# Patient Record
Sex: FEMALE | Race: WHITE | NOT HISPANIC OR LATINO | Employment: OTHER | URBAN - METROPOLITAN AREA
[De-identification: names, ages, dates, MRNs, and addresses within clinical notes are randomized per-mention and may not be internally consistent; named-entity substitution may affect disease eponyms.]

---

## 2017-01-01 ENCOUNTER — ANESTHESIA EVENT (OUTPATIENT)
Dept: GASTROENTEROLOGY | Facility: HOSPITAL | Age: 82
End: 2017-01-01

## 2017-01-01 ENCOUNTER — APPOINTMENT (OUTPATIENT)
Dept: GENERAL RADIOLOGY | Facility: HOSPITAL | Age: 82
End: 2017-01-01

## 2017-01-01 ENCOUNTER — APPOINTMENT (OUTPATIENT)
Dept: GENERAL RADIOLOGY | Facility: HOSPITAL | Age: 82
End: 2017-01-01
Attending: INTERNAL MEDICINE

## 2017-01-01 ENCOUNTER — APPOINTMENT (OUTPATIENT)
Dept: GENERAL RADIOLOGY | Facility: HOSPITAL | Age: 82
End: 2017-01-01
Attending: HOSPITALIST

## 2017-01-01 ENCOUNTER — APPOINTMENT (OUTPATIENT)
Dept: ULTRASOUND IMAGING | Facility: HOSPITAL | Age: 82
End: 2017-01-01

## 2017-01-01 ENCOUNTER — HOSPITAL ENCOUNTER (INPATIENT)
Facility: HOSPITAL | Age: 82
LOS: 7 days | End: 2017-03-24
Attending: HOSPITALIST | Admitting: INTERNAL MEDICINE

## 2017-01-01 ENCOUNTER — HOSPITAL ENCOUNTER (INPATIENT)
Facility: HOSPITAL | Age: 82
LOS: 14 days | End: 2017-03-17
Attending: EMERGENCY MEDICINE | Admitting: INTERNAL MEDICINE

## 2017-01-01 ENCOUNTER — ANESTHESIA (OUTPATIENT)
Dept: GASTROENTEROLOGY | Facility: HOSPITAL | Age: 82
End: 2017-01-01

## 2017-01-01 ENCOUNTER — APPOINTMENT (OUTPATIENT)
Dept: CT IMAGING | Facility: HOSPITAL | Age: 82
End: 2017-01-01

## 2017-01-01 ENCOUNTER — APPOINTMENT (OUTPATIENT)
Dept: CT IMAGING | Facility: HOSPITAL | Age: 82
End: 2017-01-01
Attending: INTERNAL MEDICINE

## 2017-01-01 ENCOUNTER — HOSPITAL ENCOUNTER (EMERGENCY)
Facility: HOSPITAL | Age: 82
Discharge: HOME | End: 2017-02-17
Attending: EMERGENCY MEDICINE | Admitting: EMERGENCY MEDICINE

## 2017-01-01 VITALS — DIASTOLIC BLOOD PRESSURE: 41 MMHG | OXYGEN SATURATION: 76 % | SYSTOLIC BLOOD PRESSURE: 75 MMHG | TEMPERATURE: 103 F

## 2017-01-01 VITALS
TEMPERATURE: 97.7 F | OXYGEN SATURATION: 99 % | HEIGHT: 60 IN | SYSTOLIC BLOOD PRESSURE: 144 MMHG | WEIGHT: 120 LBS | RESPIRATION RATE: 18 BRPM | HEART RATE: 60 BPM | DIASTOLIC BLOOD PRESSURE: 55 MMHG | BODY MASS INDEX: 23.56 KG/M2

## 2017-01-01 VITALS
DIASTOLIC BLOOD PRESSURE: 49 MMHG | OXYGEN SATURATION: 95 % | HEART RATE: 66 BPM | TEMPERATURE: 99.3 F | BODY MASS INDEX: 27.09 KG/M2 | RESPIRATION RATE: 16 BRPM | SYSTOLIC BLOOD PRESSURE: 108 MMHG | WEIGHT: 138 LBS | HEIGHT: 60 IN

## 2017-01-01 DIAGNOSIS — N18.4 CHRONIC RENAL FAILURE, STAGE 4 (SEVERE) (HCC): Primary | ICD-10-CM

## 2017-01-01 DIAGNOSIS — N17.9 ACUTE ON CHRONIC RENAL FAILURE (HCC): Primary | ICD-10-CM

## 2017-01-01 DIAGNOSIS — E87.5 HYPERKALEMIA: ICD-10-CM

## 2017-01-01 DIAGNOSIS — N18.9 ACUTE ON CHRONIC RENAL FAILURE (HCC): Primary | ICD-10-CM

## 2017-01-01 DIAGNOSIS — R53.1 GENERAL WEAKNESS: ICD-10-CM

## 2017-01-01 DIAGNOSIS — G93.41 METABOLIC ENCEPHALOPATHY: ICD-10-CM

## 2017-01-01 DIAGNOSIS — E83.52 HYPERCALCEMIA: ICD-10-CM

## 2017-01-01 LAB
A1AT SERPL-MCNC: 168 MG/DL (ref 90–200)
ACTIN IGG SERPL-ACNC: 6 UNITS (ref 0–19)
ALBUMIN SERPL-MCNC: 2.4 G/DL (ref 3.5–5.2)
ALBUMIN SERPL-MCNC: 2.5 G/DL (ref 2.9–4.4)
ALBUMIN SERPL-MCNC: 2.5 G/DL (ref 3.5–5.2)
ALBUMIN SERPL-MCNC: 2.5 G/DL (ref 3.5–5.2)
ALBUMIN SERPL-MCNC: 2.6 G/DL (ref 3.5–5.2)
ALBUMIN SERPL-MCNC: 2.7 G/DL (ref 3.5–5.2)
ALBUMIN SERPL-MCNC: 3.3 G/DL (ref 3.5–5.2)
ALBUMIN SERPL-MCNC: 3.8 G/DL (ref 3.5–5.2)
ALBUMIN/GLOB SERPL: 0.9 G/DL
ALBUMIN/GLOB SERPL: 0.9 G/DL
ALBUMIN/GLOB SERPL: 1 G/DL
ALBUMIN/GLOB SERPL: 1.1 G/DL
ALBUMIN/GLOB SERPL: 1.2 {RATIO} (ref 0.7–1.7)
ALBUMIN/GLOB SERPL: 1.4 G/DL
ALP SERPL-CCNC: 143 U/L (ref 39–117)
ALP SERPL-CCNC: 148 U/L (ref 39–117)
ALP SERPL-CCNC: 165 U/L (ref 39–117)
ALP SERPL-CCNC: 171 U/L (ref 39–117)
ALP SERPL-CCNC: 249 U/L (ref 39–117)
ALP SERPL-CCNC: 343 U/L (ref 39–117)
ALP SERPL-CCNC: 352 U/L (ref 39–117)
ALP SERPL-CCNC: 389 U/L (ref 39–117)
ALP SERPL-CCNC: 390 U/L (ref 39–117)
ALP SERPL-CCNC: 409 U/L (ref 39–117)
ALP SERPL-CCNC: 412 U/L (ref 39–117)
ALP SERPL-CCNC: 422 U/L (ref 39–117)
ALPHA1 GLOB FLD ELPH-MCNC: 0.3 G/DL (ref 0–0.4)
ALPHA2 GLOB SERPL ELPH-MCNC: 0.6 G/DL (ref 0.4–1)
ALT SERPL W P-5'-P-CCNC: 100 U/L (ref 1–33)
ALT SERPL W P-5'-P-CCNC: 102 U/L (ref 1–33)
ALT SERPL W P-5'-P-CCNC: 130 U/L (ref 1–33)
ALT SERPL W P-5'-P-CCNC: 169 U/L (ref 1–33)
ALT SERPL W P-5'-P-CCNC: 201 U/L (ref 1–33)
ALT SERPL W P-5'-P-CCNC: 206 U/L (ref 1–33)
ALT SERPL W P-5'-P-CCNC: 208 U/L (ref 1–33)
ALT SERPL W P-5'-P-CCNC: 218 U/L (ref 1–33)
ALT SERPL W P-5'-P-CCNC: 69 U/L (ref 1–33)
ALT SERPL W P-5'-P-CCNC: 74 U/L (ref 1–33)
ALT SERPL W P-5'-P-CCNC: 79 U/L (ref 1–33)
ALT SERPL W P-5'-P-CCNC: 95 U/L (ref 1–33)
AMYLASE SERPL-CCNC: 134 U/L (ref 28–100)
AMYLASE SERPL-CCNC: 37 U/L (ref 28–100)
ANA SER QL: NEGATIVE
ANION GAP SERPL CALCULATED.3IONS-SCNC: 13 MMOL/L
ANION GAP SERPL CALCULATED.3IONS-SCNC: 13.2 MMOL/L
ANION GAP SERPL CALCULATED.3IONS-SCNC: 13.2 MMOL/L
ANION GAP SERPL CALCULATED.3IONS-SCNC: 14.7 MMOL/L
ANION GAP SERPL CALCULATED.3IONS-SCNC: 15 MMOL/L
ANION GAP SERPL CALCULATED.3IONS-SCNC: 15.6 MMOL/L
ANION GAP SERPL CALCULATED.3IONS-SCNC: 15.8 MMOL/L
ANION GAP SERPL CALCULATED.3IONS-SCNC: 15.9 MMOL/L
ANION GAP SERPL CALCULATED.3IONS-SCNC: 15.9 MMOL/L
ANION GAP SERPL CALCULATED.3IONS-SCNC: 16.2 MMOL/L
ANION GAP SERPL CALCULATED.3IONS-SCNC: 16.5 MMOL/L
ANION GAP SERPL CALCULATED.3IONS-SCNC: 17.6 MMOL/L
ANION GAP SERPL CALCULATED.3IONS-SCNC: 18 MMOL/L
ANION GAP SERPL CALCULATED.3IONS-SCNC: 19.8 MMOL/L
APTT PPP: 40.7 SECONDS (ref 22.7–35.4)
AST SERPL-CCNC: 106 U/L (ref 1–32)
AST SERPL-CCNC: 125 U/L (ref 1–32)
AST SERPL-CCNC: 154 U/L (ref 1–32)
AST SERPL-CCNC: 170 U/L (ref 1–32)
AST SERPL-CCNC: 176 U/L (ref 1–32)
AST SERPL-CCNC: 239 U/L (ref 1–32)
AST SERPL-CCNC: 257 U/L (ref 1–32)
AST SERPL-CCNC: 60 U/L (ref 1–32)
AST SERPL-CCNC: 60 U/L (ref 1–32)
AST SERPL-CCNC: 70 U/L (ref 1–32)
AST SERPL-CCNC: 86 U/L (ref 1–32)
AST SERPL-CCNC: 94 U/L (ref 1–32)
B PERT DNA SPEC QL NAA+PROBE: NOT DETECTED
B PERT DNA SPEC QL NAA+PROBE: NOT DETECTED
B-GLOBULIN SERPL ELPH-MCNC: 0.6 G/DL (ref 0.7–1.3)
BACTERIA SPEC AEROBE CULT: ABNORMAL
BACTERIA SPEC AEROBE CULT: ABNORMAL
BACTERIA SPEC AEROBE CULT: NORMAL
BACTERIA SPEC AEROBE CULT: NORMAL
BACTERIA UR QL AUTO: ABNORMAL /HPF
BASOPHILS # BLD AUTO: 0.03 10*3/MM3 (ref 0–0.2)
BASOPHILS # BLD AUTO: 0.04 10*3/MM3 (ref 0–0.2)
BASOPHILS # BLD AUTO: 0.04 10*3/MM3 (ref 0–0.2)
BASOPHILS # BLD AUTO: 0.05 10*3/MM3 (ref 0–0.2)
BASOPHILS # BLD AUTO: 0.05 10*3/MM3 (ref 0–0.2)
BASOPHILS # BLD AUTO: 0.06 10*3/MM3 (ref 0–0.2)
BASOPHILS # BLD AUTO: 0.08 10*3/MM3 (ref 0–0.2)
BASOPHILS NFR BLD AUTO: 0.2 % (ref 0–1.5)
BASOPHILS NFR BLD AUTO: 0.3 % (ref 0–1.5)
BASOPHILS NFR BLD AUTO: 0.3 % (ref 0–1.5)
BASOPHILS NFR BLD AUTO: 0.4 % (ref 0–1.5)
BASOPHILS NFR BLD AUTO: 0.5 % (ref 0–1.5)
BASOPHILS NFR BLD AUTO: 0.5 % (ref 0–1.5)
BASOPHILS NFR BLD AUTO: 0.7 % (ref 0–1.5)
BILIRUB CONJ SERPL-MCNC: 0.2 MG/DL (ref 0–0.3)
BILIRUB CONJ SERPL-MCNC: <0.2 MG/DL (ref 0–0.3)
BILIRUB INDIRECT SERPL-MCNC: 0.2 MG/DL
BILIRUB INDIRECT SERPL-MCNC: ABNORMAL MG/DL
BILIRUB SERPL-MCNC: 0.2 MG/DL (ref 0.1–1.2)
BILIRUB SERPL-MCNC: 0.3 MG/DL (ref 0.1–1.2)
BILIRUB SERPL-MCNC: 0.4 MG/DL (ref 0.1–1.2)
BILIRUB UR QL STRIP: NEGATIVE
BUN BLD-MCNC: 100 MG/DL (ref 8–23)
BUN BLD-MCNC: 106 MG/DL (ref 8–23)
BUN BLD-MCNC: 75 MG/DL (ref 8–23)
BUN BLD-MCNC: 75 MG/DL (ref 8–23)
BUN BLD-MCNC: 79 MG/DL (ref 8–23)
BUN BLD-MCNC: 81 MG/DL (ref 8–23)
BUN BLD-MCNC: 83 MG/DL (ref 8–23)
BUN BLD-MCNC: 84 MG/DL (ref 8–23)
BUN BLD-MCNC: 85 MG/DL (ref 8–23)
BUN BLD-MCNC: 86 MG/DL (ref 8–23)
BUN BLD-MCNC: 89 MG/DL (ref 8–23)
BUN BLD-MCNC: 93 MG/DL (ref 8–23)
BUN BLD-MCNC: 94 MG/DL (ref 8–23)
BUN BLD-MCNC: 98 MG/DL (ref 8–23)
BUN/CREAT SERPL: 19.6 (ref 7–25)
BUN/CREAT SERPL: 21.6 (ref 7–25)
BUN/CREAT SERPL: 22 (ref 7–25)
BUN/CREAT SERPL: 22.4 (ref 7–25)
BUN/CREAT SERPL: 23.8 (ref 7–25)
BUN/CREAT SERPL: 23.8 (ref 7–25)
BUN/CREAT SERPL: 24.2 (ref 7–25)
BUN/CREAT SERPL: 24.2 (ref 7–25)
BUN/CREAT SERPL: 24.8 (ref 7–25)
BUN/CREAT SERPL: 25.4 (ref 7–25)
BUN/CREAT SERPL: 25.5 (ref 7–25)
BUN/CREAT SERPL: 26 (ref 7–25)
BUN/CREAT SERPL: 26.2 (ref 7–25)
BUN/CREAT SERPL: 26.8 (ref 7–25)
C PNEUM DNA NPH QL NAA+NON-PROBE: NOT DETECTED
C PNEUM DNA NPH QL NAA+NON-PROBE: NOT DETECTED
CA-I BLD-MCNC: 4.8 MG/DL (ref 4.6–5.4)
CA-I BLD-MCNC: 5.6 MG/DL (ref 4.6–5.4)
CA-I BLD-MCNC: 6.6 MG/DL (ref 4.6–5.4)
CA-I BLD-MCNC: 7 MG/DL (ref 4.6–5.4)
CA-I SERPL ISE-MCNC: 1.19 MMOL/L (ref 1.1–1.35)
CA-I SERPL ISE-MCNC: 1.41 MMOL/L (ref 1.1–1.35)
CA-I SERPL ISE-MCNC: 1.66 MMOL/L (ref 1.1–1.35)
CA-I SERPL ISE-MCNC: 1.74 MMOL/L (ref 1.1–1.35)
CALCIUM SPEC-SCNC: 10.9 MG/DL (ref 8.6–10.5)
CALCIUM SPEC-SCNC: 11 MG/DL (ref 8.6–10.5)
CALCIUM SPEC-SCNC: 11.7 MG/DL (ref 8.6–10.5)
CALCIUM SPEC-SCNC: 11.9 MG/DL (ref 8.6–10.5)
CALCIUM SPEC-SCNC: 11.9 MG/DL (ref 8.6–10.5)
CALCIUM SPEC-SCNC: 12 MG/DL (ref 8.6–10.5)
CALCIUM SPEC-SCNC: 13.8 MG/DL (ref 8.6–10.5)
CALCIUM SPEC-SCNC: 8 MG/DL (ref 8.6–10.5)
CALCIUM SPEC-SCNC: 8 MG/DL (ref 8.6–10.5)
CALCIUM SPEC-SCNC: 8.1 MG/DL (ref 8.6–10.5)
CALCIUM SPEC-SCNC: 8.2 MG/DL (ref 8.6–10.5)
CALCIUM SPEC-SCNC: 8.5 MG/DL (ref 8.6–10.5)
CALCIUM SPEC-SCNC: 9 MG/DL (ref 8.6–10.5)
CALCIUM SPEC-SCNC: 9.3 MG/DL (ref 8.6–10.5)
CERULOPLASMIN SERPL-MCNC: 27.3 MG/DL (ref 19–39)
CHLORIDE SERPL-SCNC: 100 MMOL/L (ref 98–107)
CHLORIDE SERPL-SCNC: 100 MMOL/L (ref 98–107)
CHLORIDE SERPL-SCNC: 101 MMOL/L (ref 98–107)
CHLORIDE SERPL-SCNC: 102 MMOL/L (ref 98–107)
CHLORIDE SERPL-SCNC: 102 MMOL/L (ref 98–107)
CHLORIDE SERPL-SCNC: 103 MMOL/L (ref 98–107)
CHLORIDE SERPL-SCNC: 90 MMOL/L (ref 98–107)
CHLORIDE SERPL-SCNC: 93 MMOL/L (ref 98–107)
CHLORIDE SERPL-SCNC: 94 MMOL/L (ref 98–107)
CHLORIDE SERPL-SCNC: 98 MMOL/L (ref 98–107)
CHLORIDE SERPL-SCNC: 99 MMOL/L (ref 98–107)
CHLORIDE SERPL-SCNC: 99 MMOL/L (ref 98–107)
CHOLEST SERPL-MCNC: 120 MG/DL (ref 0–200)
CLARITY UR: ABNORMAL
CLUMPED PLATELETS: PRESENT
CO2 SERPL-SCNC: 17.4 MMOL/L (ref 22–29)
CO2 SERPL-SCNC: 18.2 MMOL/L (ref 22–29)
CO2 SERPL-SCNC: 19 MMOL/L (ref 22–29)
CO2 SERPL-SCNC: 21.1 MMOL/L (ref 22–29)
CO2 SERPL-SCNC: 21.1 MMOL/L (ref 22–29)
CO2 SERPL-SCNC: 21.5 MMOL/L (ref 22–29)
CO2 SERPL-SCNC: 21.8 MMOL/L (ref 22–29)
CO2 SERPL-SCNC: 22.4 MMOL/L (ref 22–29)
CO2 SERPL-SCNC: 22.8 MMOL/L (ref 22–29)
CO2 SERPL-SCNC: 25 MMOL/L (ref 22–29)
CO2 SERPL-SCNC: 25.8 MMOL/L (ref 22–29)
CO2 SERPL-SCNC: 26.3 MMOL/L (ref 22–29)
CO2 SERPL-SCNC: 28.2 MMOL/L (ref 22–29)
CO2 SERPL-SCNC: 29 MMOL/L (ref 22–29)
COLOR UR: YELLOW
CREAT BLD-MCNC: 3.02 MG/DL (ref 0.57–1)
CREAT BLD-MCNC: 3.12 MG/DL (ref 0.57–1)
CREAT BLD-MCNC: 3.13 MG/DL (ref 0.57–1)
CREAT BLD-MCNC: 3.32 MG/DL (ref 0.57–1)
CREAT BLD-MCNC: 3.34 MG/DL (ref 0.57–1)
CREAT BLD-MCNC: 3.43 MG/DL (ref 0.57–1)
CREAT BLD-MCNC: 3.48 MG/DL (ref 0.57–1)
CREAT BLD-MCNC: 3.59 MG/DL (ref 0.57–1)
CREAT BLD-MCNC: 3.62 MG/DL (ref 0.57–1)
CREAT BLD-MCNC: 3.64 MG/DL (ref 0.57–1)
CREAT BLD-MCNC: 3.68 MG/DL (ref 0.57–1)
CREAT BLD-MCNC: 4.37 MG/DL (ref 0.57–1)
CREAT BLD-MCNC: 4.82 MG/DL (ref 0.57–1)
CREAT BLD-MCNC: 5.11 MG/DL (ref 0.57–1)
D-LACTATE SERPL-SCNC: 0.8 MMOL/L (ref 0.5–2)
DACRYOCYTES BLD QL SMEAR: NORMAL
DEPRECATED MITOCHONDRIA M2 IGG SER-ACNC: <20 UNITS (ref 0–20)
DEPRECATED RDW RBC AUTO: 48.2 FL (ref 37–54)
DEPRECATED RDW RBC AUTO: 48.3 FL (ref 37–54)
DEPRECATED RDW RBC AUTO: 49.6 FL (ref 37–54)
DEPRECATED RDW RBC AUTO: 49.8 FL (ref 37–54)
DEPRECATED RDW RBC AUTO: 50 FL (ref 37–54)
DEPRECATED RDW RBC AUTO: 50.2 FL (ref 37–54)
DEPRECATED RDW RBC AUTO: 51.2 FL (ref 37–54)
DEPRECATED RDW RBC AUTO: 51.3 FL (ref 37–54)
DEPRECATED RDW RBC AUTO: 51.5 FL (ref 37–54)
DEPRECATED RDW RBC AUTO: 51.9 FL (ref 37–54)
DEPRECATED RDW RBC AUTO: 52 FL (ref 37–54)
DEPRECATED RDW RBC AUTO: 53.1 FL (ref 37–54)
ENDOMYSIUM IGA SER QL: NEGATIVE
EOSINOPHIL # BLD AUTO: 0.12 10*3/MM3 (ref 0–0.7)
EOSINOPHIL # BLD AUTO: 0.19 10*3/MM3 (ref 0–0.7)
EOSINOPHIL # BLD AUTO: 0.26 10*3/MM3 (ref 0–0.7)
EOSINOPHIL # BLD AUTO: 0.4 10*3/MM3 (ref 0–0.7)
EOSINOPHIL # BLD AUTO: 0.41 10*3/MM3 (ref 0–0.7)
EOSINOPHIL # BLD AUTO: 0.46 10*3/MM3 (ref 0–0.7)
EOSINOPHIL # BLD AUTO: 0.47 10*3/MM3 (ref 0–0.7)
EOSINOPHIL # BLD AUTO: 0.65 10*3/MM3 (ref 0–0.7)
EOSINOPHIL # BLD AUTO: 0.87 10*3/MM3 (ref 0–0.7)
EOSINOPHIL NFR BLD AUTO: 0.9 % (ref 0.3–6.2)
EOSINOPHIL NFR BLD AUTO: 1.5 % (ref 0.3–6.2)
EOSINOPHIL NFR BLD AUTO: 1.6 % (ref 0.3–6.2)
EOSINOPHIL NFR BLD AUTO: 3 % (ref 0.3–6.2)
EOSINOPHIL NFR BLD AUTO: 3.1 % (ref 0.3–6.2)
EOSINOPHIL NFR BLD AUTO: 3.7 % (ref 0.3–6.2)
EOSINOPHIL NFR BLD AUTO: 4.3 % (ref 0.3–6.2)
EOSINOPHIL NFR BLD AUTO: 4.4 % (ref 0.3–6.2)
EOSINOPHIL NFR BLD AUTO: 7.8 % (ref 0.3–6.2)
ERYTHROCYTE [DISTWIDTH] IN BLOOD BY AUTOMATED COUNT: 14.5 % (ref 11.7–13)
ERYTHROCYTE [DISTWIDTH] IN BLOOD BY AUTOMATED COUNT: 14.5 % (ref 11.7–13)
ERYTHROCYTE [DISTWIDTH] IN BLOOD BY AUTOMATED COUNT: 14.6 % (ref 11.7–13)
ERYTHROCYTE [DISTWIDTH] IN BLOOD BY AUTOMATED COUNT: 14.7 % (ref 11.7–13)
ERYTHROCYTE [DISTWIDTH] IN BLOOD BY AUTOMATED COUNT: 14.9 % (ref 11.7–13)
ERYTHROCYTE [DISTWIDTH] IN BLOOD BY AUTOMATED COUNT: 15 % (ref 11.7–13)
ERYTHROCYTE [DISTWIDTH] IN BLOOD BY AUTOMATED COUNT: 15 % (ref 11.7–13)
ERYTHROCYTE [DISTWIDTH] IN BLOOD BY AUTOMATED COUNT: 15.1 % (ref 11.7–13)
ERYTHROCYTE [DISTWIDTH] IN BLOOD BY AUTOMATED COUNT: 15.1 % (ref 11.7–13)
ERYTHROCYTE [DISTWIDTH] IN BLOOD BY AUTOMATED COUNT: 15.3 % (ref 11.7–13)
FERRITIN SERPL-MCNC: 286 NG/ML (ref 13–150)
FLUAV H1 2009 PAND RNA NPH QL NAA+PROBE: NOT DETECTED
FLUAV H1 2009 PAND RNA NPH QL NAA+PROBE: NOT DETECTED
FLUAV H1 HA GENE NPH QL NAA+PROBE: NOT DETECTED
FLUAV H1 HA GENE NPH QL NAA+PROBE: NOT DETECTED
FLUAV H3 RNA NPH QL NAA+PROBE: NOT DETECTED
FLUAV H3 RNA NPH QL NAA+PROBE: NOT DETECTED
FLUAV SUBTYP SPEC NAA+PROBE: NOT DETECTED
FLUAV SUBTYP SPEC NAA+PROBE: NOT DETECTED
FLUBV RNA ISLT QL NAA+PROBE: NOT DETECTED
FLUBV RNA ISLT QL NAA+PROBE: NOT DETECTED
GAMMA GLOB SERPL ELPH-MCNC: 0.6 G/DL (ref 0.4–1.8)
GFR SERPL CREATININE-BSD FRML MDRD: 10 ML/MIN/1.73
GFR SERPL CREATININE-BSD FRML MDRD: 12 ML/MIN/1.73
GFR SERPL CREATININE-BSD FRML MDRD: 13 ML/MIN/1.73
GFR SERPL CREATININE-BSD FRML MDRD: 14 ML/MIN/1.73
GFR SERPL CREATININE-BSD FRML MDRD: 14 ML/MIN/1.73
GFR SERPL CREATININE-BSD FRML MDRD: 15 ML/MIN/1.73
GFR SERPL CREATININE-BSD FRML MDRD: 8 ML/MIN/1.73
GFR SERPL CREATININE-BSD FRML MDRD: 9 ML/MIN/1.73
GGT SERPL-CCNC: 78 U/L (ref 5–36)
GLIADIN PEPTIDE IGA SER-ACNC: 2 UNITS (ref 0–19)
GLIADIN PEPTIDE IGG SER-ACNC: 3 UNITS (ref 0–19)
GLOBULIN SER CALC-MCNC: 2.1 G/DL (ref 2.2–3.9)
GLOBULIN UR ELPH-MCNC: 2.5 GM/DL
GLOBULIN UR ELPH-MCNC: 2.7 GM/DL
GLOBULIN UR ELPH-MCNC: 2.8 GM/DL
GLOBULIN UR ELPH-MCNC: 3.1 GM/DL
GLOBULIN UR ELPH-MCNC: 3.1 GM/DL
GLUCOSE BLD-MCNC: 100 MG/DL (ref 65–99)
GLUCOSE BLD-MCNC: 101 MG/DL (ref 65–99)
GLUCOSE BLD-MCNC: 103 MG/DL (ref 65–99)
GLUCOSE BLD-MCNC: 116 MG/DL (ref 65–99)
GLUCOSE BLD-MCNC: 125 MG/DL (ref 65–99)
GLUCOSE BLD-MCNC: 61 MG/DL (ref 65–99)
GLUCOSE BLD-MCNC: 64 MG/DL (ref 65–99)
GLUCOSE BLD-MCNC: 67 MG/DL (ref 65–99)
GLUCOSE BLD-MCNC: 71 MG/DL (ref 65–99)
GLUCOSE BLD-MCNC: 73 MG/DL (ref 65–99)
GLUCOSE BLD-MCNC: 81 MG/DL (ref 65–99)
GLUCOSE BLD-MCNC: 82 MG/DL (ref 65–99)
GLUCOSE BLD-MCNC: 93 MG/DL (ref 65–99)
GLUCOSE BLD-MCNC: 94 MG/DL (ref 65–99)
GLUCOSE BLDC GLUCOMTR-MCNC: 85 MG/DL (ref 70–130)
GLUCOSE UR STRIP-MCNC: NEGATIVE MG/DL
HADV DNA SPEC NAA+PROBE: NOT DETECTED
HADV DNA SPEC NAA+PROBE: NOT DETECTED
HAV IGM SERPL QL IA: NORMAL
HBV CORE IGM SERPL QL IA: NORMAL
HBV SURFACE AG SERPL QL IA: NORMAL
HCOV 229E RNA SPEC QL NAA+PROBE: NOT DETECTED
HCOV 229E RNA SPEC QL NAA+PROBE: NOT DETECTED
HCOV HKU1 RNA SPEC QL NAA+PROBE: NOT DETECTED
HCOV HKU1 RNA SPEC QL NAA+PROBE: NOT DETECTED
HCOV NL63 RNA SPEC QL NAA+PROBE: NOT DETECTED
HCOV NL63 RNA SPEC QL NAA+PROBE: NOT DETECTED
HCOV OC43 RNA SPEC QL NAA+PROBE: NOT DETECTED
HCOV OC43 RNA SPEC QL NAA+PROBE: NOT DETECTED
HCT VFR BLD AUTO: 27.7 % (ref 35.6–45.5)
HCT VFR BLD AUTO: 28.7 % (ref 35.6–45.5)
HCT VFR BLD AUTO: 29.1 % (ref 35.6–45.5)
HCT VFR BLD AUTO: 29.3 % (ref 35.6–45.5)
HCT VFR BLD AUTO: 30.4 % (ref 35.6–45.5)
HCT VFR BLD AUTO: 30.7 % (ref 35.6–45.5)
HCT VFR BLD AUTO: 30.8 % (ref 35.6–45.5)
HCT VFR BLD AUTO: 31.6 % (ref 35.6–45.5)
HCT VFR BLD AUTO: 31.7 % (ref 35.6–45.5)
HCT VFR BLD AUTO: 32.2 % (ref 35.6–45.5)
HCT VFR BLD AUTO: 32.2 % (ref 35.6–45.5)
HCT VFR BLD AUTO: 32.5 % (ref 35.6–45.5)
HCV AB SER DONR QL: NORMAL
HDLC SERPL-MCNC: 44 MG/DL (ref 40–60)
HGB BLD-MCNC: 10.1 G/DL (ref 11.9–15.5)
HGB BLD-MCNC: 10.1 G/DL (ref 11.9–15.5)
HGB BLD-MCNC: 10.2 G/DL (ref 11.9–15.5)
HGB BLD-MCNC: 10.4 G/DL (ref 11.9–15.5)
HGB BLD-MCNC: 10.5 G/DL (ref 11.9–15.5)
HGB BLD-MCNC: 10.8 G/DL (ref 11.9–15.5)
HGB BLD-MCNC: 9.1 G/DL (ref 11.9–15.5)
HGB BLD-MCNC: 9.4 G/DL (ref 11.9–15.5)
HGB BLD-MCNC: 9.7 G/DL (ref 11.9–15.5)
HGB BLD-MCNC: 9.7 G/DL (ref 11.9–15.5)
HGB UR QL STRIP.AUTO: NEGATIVE
HMPV RNA NPH QL NAA+NON-PROBE: NOT DETECTED
HMPV RNA NPH QL NAA+NON-PROBE: NOT DETECTED
HPIV1 RNA SPEC QL NAA+PROBE: NOT DETECTED
HPIV1 RNA SPEC QL NAA+PROBE: NOT DETECTED
HPIV2 RNA SPEC QL NAA+PROBE: NOT DETECTED
HPIV2 RNA SPEC QL NAA+PROBE: NOT DETECTED
HPIV3 RNA NPH QL NAA+PROBE: NOT DETECTED
HPIV3 RNA NPH QL NAA+PROBE: NOT DETECTED
HPIV4 P GENE NPH QL NAA+PROBE: NOT DETECTED
HPIV4 P GENE NPH QL NAA+PROBE: NOT DETECTED
HYALINE CASTS UR QL AUTO: ABNORMAL /LPF
IGA SERPL-MCNC: 104 MG/DL (ref 64–422)
IMM GRANULOCYTES # BLD: 0.02 10*3/MM3 (ref 0–0.03)
IMM GRANULOCYTES # BLD: 0.02 10*3/MM3 (ref 0–0.03)
IMM GRANULOCYTES # BLD: 0.03 10*3/MM3 (ref 0–0.03)
IMM GRANULOCYTES # BLD: 0.04 10*3/MM3 (ref 0–0.03)
IMM GRANULOCYTES # BLD: 0.05 10*3/MM3 (ref 0–0.03)
IMM GRANULOCYTES # BLD: 0.05 10*3/MM3 (ref 0–0.03)
IMM GRANULOCYTES # BLD: 0.06 10*3/MM3 (ref 0–0.03)
IMM GRANULOCYTES NFR BLD: 0.2 % (ref 0–0.5)
IMM GRANULOCYTES NFR BLD: 0.3 % (ref 0–0.5)
IMM GRANULOCYTES NFR BLD: 0.4 % (ref 0–0.5)
IMM GRANULOCYTES NFR BLD: 0.5 % (ref 0–0.5)
INR PPP: 1.01 (ref 0.9–1.1)
INR PPP: 1.18 (ref 0.9–1.1)
IRON 24H UR-MRATE: 15 MCG/DL (ref 37–145)
IRON SATN MFR SERPL: 5 % (ref 20–50)
KETONES UR QL STRIP: NEGATIVE
L PNEUMO1 AG UR QL IA: NEGATIVE
LDLC SERPL CALC-MCNC: 63 MG/DL (ref 0–100)
LDLC/HDLC SERPL: 1.44 {RATIO}
LEUKOCYTE ESTERASE UR QL STRIP.AUTO: ABNORMAL
LIPASE SERPL-CCNC: 51 U/L (ref 13–60)
LIPASE SERPL-CCNC: 534 U/L (ref 13–60)
LIPASE SERPL-CCNC: 63 U/L (ref 13–60)
LIPASE SERPL-CCNC: 95 U/L (ref 13–60)
LYMPHOCYTES # BLD AUTO: 0.73 10*3/MM3 (ref 0.9–4.8)
LYMPHOCYTES # BLD AUTO: 1.03 10*3/MM3 (ref 0.9–4.8)
LYMPHOCYTES # BLD AUTO: 1.19 10*3/MM3 (ref 0.9–4.8)
LYMPHOCYTES # BLD AUTO: 1.27 10*3/MM3 (ref 0.9–4.8)
LYMPHOCYTES # BLD AUTO: 1.3 10*3/MM3 (ref 0.9–4.8)
LYMPHOCYTES # BLD AUTO: 1.35 10*3/MM3 (ref 0.9–4.8)
LYMPHOCYTES # BLD AUTO: 1.56 10*3/MM3 (ref 0.9–4.8)
LYMPHOCYTES # BLD AUTO: 2.25 10*3/MM3 (ref 0.9–4.8)
LYMPHOCYTES # BLD AUTO: 2.47 10*3/MM3 (ref 0.9–4.8)
LYMPHOCYTES NFR BLD AUTO: 10.4 % (ref 19.6–45.3)
LYMPHOCYTES NFR BLD AUTO: 11.6 % (ref 19.6–45.3)
LYMPHOCYTES NFR BLD AUTO: 12.5 % (ref 19.6–45.3)
LYMPHOCYTES NFR BLD AUTO: 15.6 % (ref 19.6–45.3)
LYMPHOCYTES NFR BLD AUTO: 20.6 % (ref 19.6–45.3)
LYMPHOCYTES NFR BLD AUTO: 5.5 % (ref 19.6–45.3)
LYMPHOCYTES NFR BLD AUTO: 8.1 % (ref 19.6–45.3)
LYMPHOCYTES NFR BLD AUTO: 8.5 % (ref 19.6–45.3)
LYMPHOCYTES NFR BLD AUTO: 8.7 % (ref 19.6–45.3)
Lab: ABNORMAL
M PNEUMO IGG SER IA-ACNC: NOT DETECTED
M PNEUMO IGG SER IA-ACNC: NOT DETECTED
M-SPIKE: ABNORMAL G/DL
MAGNESIUM SERPL-MCNC: 2.5 MG/DL (ref 1.6–2.4)
MAGNESIUM SERPL-MCNC: 2.7 MG/DL (ref 1.6–2.4)
MAGNESIUM SERPL-MCNC: 2.9 MG/DL (ref 1.6–2.4)
MCH RBC QN AUTO: 29.8 PG (ref 26.9–32)
MCH RBC QN AUTO: 29.9 PG (ref 26.9–32)
MCH RBC QN AUTO: 30.1 PG (ref 26.9–32)
MCH RBC QN AUTO: 30.3 PG (ref 26.9–32)
MCH RBC QN AUTO: 30.4 PG (ref 26.9–32)
MCH RBC QN AUTO: 30.6 PG (ref 26.9–32)
MCH RBC QN AUTO: 30.7 PG (ref 26.9–32)
MCH RBC QN AUTO: 30.8 PG (ref 26.9–32)
MCH RBC QN AUTO: 30.9 PG (ref 26.9–32)
MCH RBC QN AUTO: 31.1 PG (ref 26.9–32)
MCHC RBC AUTO-ENTMCNC: 31.7 G/DL (ref 32.4–36.3)
MCHC RBC AUTO-ENTMCNC: 32.2 G/DL (ref 32.4–36.3)
MCHC RBC AUTO-ENTMCNC: 32.3 G/DL (ref 32.4–36.3)
MCHC RBC AUTO-ENTMCNC: 32.6 G/DL (ref 32.4–36.3)
MCHC RBC AUTO-ENTMCNC: 32.8 G/DL (ref 32.4–36.3)
MCHC RBC AUTO-ENTMCNC: 32.9 G/DL (ref 32.4–36.3)
MCHC RBC AUTO-ENTMCNC: 33.1 G/DL (ref 32.4–36.3)
MCHC RBC AUTO-ENTMCNC: 33.2 G/DL (ref 32.4–36.3)
MCHC RBC AUTO-ENTMCNC: 33.6 G/DL (ref 32.4–36.3)
MCHC RBC AUTO-ENTMCNC: 33.8 G/DL (ref 32.4–36.3)
MCV RBC AUTO: 90.9 FL (ref 80.5–98.2)
MCV RBC AUTO: 91.3 FL (ref 80.5–98.2)
MCV RBC AUTO: 91.4 FL (ref 80.5–98.2)
MCV RBC AUTO: 92.1 FL (ref 80.5–98.2)
MCV RBC AUTO: 92.7 FL (ref 80.5–98.2)
MCV RBC AUTO: 93 FL (ref 80.5–98.2)
MCV RBC AUTO: 93 FL (ref 80.5–98.2)
MCV RBC AUTO: 93.3 FL (ref 80.5–98.2)
MCV RBC AUTO: 93.3 FL (ref 80.5–98.2)
MCV RBC AUTO: 93.9 FL (ref 80.5–98.2)
MCV RBC AUTO: 94.6 FL (ref 80.5–98.2)
MCV RBC AUTO: 95.5 FL (ref 80.5–98.2)
MONOCYTES # BLD AUTO: 1.33 10*3/MM3 (ref 0.2–1.2)
MONOCYTES # BLD AUTO: 1.36 10*3/MM3 (ref 0.2–1.2)
MONOCYTES # BLD AUTO: 1.53 10*3/MM3 (ref 0.2–1.2)
MONOCYTES # BLD AUTO: 1.64 10*3/MM3 (ref 0.2–1.2)
MONOCYTES # BLD AUTO: 1.73 10*3/MM3 (ref 0.2–1.2)
MONOCYTES # BLD AUTO: 1.75 10*3/MM3 (ref 0.2–1.2)
MONOCYTES # BLD AUTO: 1.98 10*3/MM3 (ref 0.2–1.2)
MONOCYTES # BLD AUTO: 2.07 10*3/MM3 (ref 0.2–1.2)
MONOCYTES # BLD AUTO: 2.31 10*3/MM3 (ref 0.2–1.2)
MONOCYTES NFR BLD AUTO: 13.2 % (ref 5–12)
MONOCYTES NFR BLD AUTO: 13.5 % (ref 5–12)
MONOCYTES NFR BLD AUTO: 13.7 % (ref 5–12)
MONOCYTES NFR BLD AUTO: 14.9 % (ref 5–12)
MONOCYTES NFR BLD AUTO: 15.9 % (ref 5–12)
MONOCYTES NFR BLD AUTO: 16.3 % (ref 5–12)
MONOCYTES NFR BLD AUTO: 16.8 % (ref 5–12)
MONOCYTES NFR BLD AUTO: 8.4 % (ref 5–12)
MONOCYTES NFR BLD AUTO: 9.1 % (ref 5–12)
MRSA SPEC QL CULT: NORMAL
NEUTROPHILS # BLD AUTO: 10.42 10*3/MM3 (ref 1.9–8.1)
NEUTROPHILS # BLD AUTO: 11.53 10*3/MM3 (ref 1.9–8.1)
NEUTROPHILS # BLD AUTO: 11.73 10*3/MM3 (ref 1.9–8.1)
NEUTROPHILS # BLD AUTO: 6.35 10*3/MM3 (ref 1.9–8.1)
NEUTROPHILS # BLD AUTO: 7.41 10*3/MM3 (ref 1.9–8.1)
NEUTROPHILS # BLD AUTO: 8.69 10*3/MM3 (ref 1.9–8.1)
NEUTROPHILS # BLD AUTO: 9.13 10*3/MM3 (ref 1.9–8.1)
NEUTROPHILS # BLD AUTO: 9.54 10*3/MM3 (ref 1.9–8.1)
NEUTROPHILS # BLD AUTO: 9.74 10*3/MM3 (ref 1.9–8.1)
NEUTROPHILS NFR BLD AUTO: 58.3 % (ref 42.7–76)
NEUTROPHILS NFR BLD AUTO: 66.2 % (ref 42.7–76)
NEUTROPHILS NFR BLD AUTO: 69.6 % (ref 42.7–76)
NEUTROPHILS NFR BLD AUTO: 70.9 % (ref 42.7–76)
NEUTROPHILS NFR BLD AUTO: 72 % (ref 42.7–76)
NEUTROPHILS NFR BLD AUTO: 73.8 % (ref 42.7–76)
NEUTROPHILS NFR BLD AUTO: 73.9 % (ref 42.7–76)
NEUTROPHILS NFR BLD AUTO: 77.3 % (ref 42.7–76)
NEUTROPHILS NFR BLD AUTO: 78.1 % (ref 42.7–76)
NITRITE UR QL STRIP: NEGATIVE
NRBC BLD MANUAL-RTO: 0 /100 WBC (ref 0–0)
NRBC BLD MANUAL-RTO: 0.2 /100 WBC (ref 0–0)
PH UR STRIP.AUTO: 5.5 [PH] (ref 5–8)
PHOSPHATE SERPL-MCNC: 3.7 MG/DL (ref 2.5–4.5)
PHOSPHATE SERPL-MCNC: 3.8 MG/DL (ref 2.5–4.5)
PHOSPHATE SERPL-MCNC: 4.1 MG/DL (ref 2.5–4.5)
PHOSPHATE SERPL-MCNC: 4.5 MG/DL (ref 2.5–4.5)
PHOSPHATE SERPL-MCNC: 4.8 MG/DL (ref 2.5–4.5)
PHOSPHATE SERPL-MCNC: 5.1 MG/DL (ref 2.5–4.5)
PHOSPHATE SERPL-MCNC: 5.4 MG/DL (ref 2.5–4.5)
PHOSPHATE SERPL-MCNC: 5.4 MG/DL (ref 2.5–4.5)
PHOSPHATE SERPL-MCNC: 5.8 MG/DL (ref 2.5–4.5)
PLAT MORPH BLD: NORMAL
PLATELET # BLD AUTO: 346 10*3/MM3 (ref 140–500)
PLATELET # BLD AUTO: 348 10*3/MM3 (ref 140–500)
PLATELET # BLD AUTO: 361 10*3/MM3 (ref 140–500)
PLATELET # BLD AUTO: 366 10*3/MM3 (ref 140–500)
PLATELET # BLD AUTO: 367 10*3/MM3 (ref 140–500)
PLATELET # BLD AUTO: 369 10*3/MM3 (ref 140–500)
PLATELET # BLD AUTO: 371 10*3/MM3 (ref 140–500)
PLATELET # BLD AUTO: 372 10*3/MM3 (ref 140–500)
PLATELET # BLD AUTO: 385 10*3/MM3 (ref 140–500)
PLATELET # BLD AUTO: 391 10*3/MM3 (ref 140–500)
PLATELET # BLD AUTO: 438 10*3/MM3 (ref 140–500)
PLATELET # BLD AUTO: 516 10*3/MM3 (ref 140–500)
PMV BLD AUTO: 10.4 FL (ref 6–12)
PMV BLD AUTO: 10.4 FL (ref 6–12)
PMV BLD AUTO: 10.5 FL (ref 6–12)
PMV BLD AUTO: 10.6 FL (ref 6–12)
PMV BLD AUTO: 10.6 FL (ref 6–12)
PMV BLD AUTO: 10.7 FL (ref 6–12)
PMV BLD AUTO: 10.8 FL (ref 6–12)
PMV BLD AUTO: 11 FL (ref 6–12)
PMV BLD AUTO: 11.2 FL (ref 6–12)
PMV BLD AUTO: 11.4 FL (ref 6–12)
POTASSIUM BLD-SCNC: 3.4 MMOL/L (ref 3.5–5.2)
POTASSIUM BLD-SCNC: 3.6 MMOL/L (ref 3.5–5.2)
POTASSIUM BLD-SCNC: 3.8 MMOL/L (ref 3.5–5.2)
POTASSIUM BLD-SCNC: 3.8 MMOL/L (ref 3.5–5.2)
POTASSIUM BLD-SCNC: 3.9 MMOL/L (ref 3.5–5.2)
POTASSIUM BLD-SCNC: 4.2 MMOL/L (ref 3.5–5.2)
POTASSIUM BLD-SCNC: 4.2 MMOL/L (ref 3.5–5.2)
POTASSIUM BLD-SCNC: 4.3 MMOL/L (ref 3.5–5.2)
POTASSIUM BLD-SCNC: 4.3 MMOL/L (ref 3.5–5.2)
POTASSIUM BLD-SCNC: 4.5 MMOL/L (ref 3.5–5.2)
POTASSIUM BLD-SCNC: 4.7 MMOL/L (ref 3.5–5.2)
POTASSIUM BLD-SCNC: 4.9 MMOL/L (ref 3.5–5.2)
POTASSIUM BLD-SCNC: 5 MMOL/L (ref 3.5–5.2)
POTASSIUM BLD-SCNC: 5.6 MMOL/L (ref 3.5–5.2)
PROCALCITONIN SERPL-MCNC: 0.77 NG/ML (ref 0.1–0.25)
PROCALCITONIN SERPL-MCNC: 0.95 NG/ML (ref 0.1–0.25)
PROCALCITONIN SERPL-MCNC: 1.1 NG/ML (ref 0.1–0.25)
PROT SERPL-MCNC: 4.6 G/DL (ref 6–8.5)
PROT SERPL-MCNC: 5 G/DL (ref 6–8.5)
PROT SERPL-MCNC: 5.2 G/DL (ref 6–8.5)
PROT SERPL-MCNC: 5.3 G/DL (ref 6–8.5)
PROT SERPL-MCNC: 5.3 G/DL (ref 6–8.5)
PROT SERPL-MCNC: 5.4 G/DL (ref 6–8.5)
PROT SERPL-MCNC: 5.4 G/DL (ref 6–8.5)
PROT SERPL-MCNC: 5.5 G/DL (ref 6–8.5)
PROT SERPL-MCNC: 5.8 G/DL (ref 6–8.5)
PROT SERPL-MCNC: 6.4 G/DL (ref 6–8.5)
PROT SERPL-MCNC: 6.6 G/DL (ref 6–8.5)
PROT UR QL STRIP: NEGATIVE
PROTHROMBIN TIME: 12.9 SECONDS (ref 11.7–14.2)
PROTHROMBIN TIME: 14.6 SECONDS (ref 11.7–14.2)
RBC # BLD AUTO: 2.95 10*6/MM3 (ref 3.9–5.2)
RBC # BLD AUTO: 3.12 10*6/MM3 (ref 3.9–5.2)
RBC # BLD AUTO: 3.14 10*6/MM3 (ref 3.9–5.2)
RBC # BLD AUTO: 3.16 10*6/MM3 (ref 3.9–5.2)
RBC # BLD AUTO: 3.3 10*6/MM3 (ref 3.9–5.2)
RBC # BLD AUTO: 3.33 10*6/MM3 (ref 3.9–5.2)
RBC # BLD AUTO: 3.34 10*6/MM3 (ref 3.9–5.2)
RBC # BLD AUTO: 3.37 10*6/MM3 (ref 3.9–5.2)
RBC # BLD AUTO: 3.39 10*6/MM3 (ref 3.9–5.2)
RBC # BLD AUTO: 3.41 10*6/MM3 (ref 3.9–5.2)
RBC # BLD AUTO: 3.45 10*6/MM3 (ref 3.9–5.2)
RBC # BLD AUTO: 3.53 10*6/MM3 (ref 3.9–5.2)
RBC # UR: ABNORMAL /HPF
RBC MORPH BLD: NORMAL
REF LAB TEST METHOD: ABNORMAL
RHINOVIRUS RNA SPEC NAA+PROBE: NOT DETECTED
RHINOVIRUS RNA SPEC NAA+PROBE: NOT DETECTED
RSV RNA NPH QL NAA+NON-PROBE: NOT DETECTED
RSV RNA NPH QL NAA+NON-PROBE: NOT DETECTED
S PNEUM AG SPEC QL LA: NEGATIVE
SCHISTOCYTES BLD QL SMEAR: NORMAL
SODIUM BLD-SCNC: 134 MMOL/L (ref 136–145)
SODIUM BLD-SCNC: 135 MMOL/L (ref 136–145)
SODIUM BLD-SCNC: 136 MMOL/L (ref 136–145)
SODIUM BLD-SCNC: 137 MMOL/L (ref 136–145)
SODIUM BLD-SCNC: 137 MMOL/L (ref 136–145)
SODIUM BLD-SCNC: 138 MMOL/L (ref 136–145)
SODIUM BLD-SCNC: 139 MMOL/L (ref 136–145)
SODIUM BLD-SCNC: 139 MMOL/L (ref 136–145)
SODIUM BLD-SCNC: 140 MMOL/L (ref 136–145)
SODIUM BLD-SCNC: 140 MMOL/L (ref 136–145)
SP GR UR STRIP: 1.02 (ref 1–1.03)
SQUAMOUS #/AREA URNS HPF: ABNORMAL /HPF
TARGETS BLD QL SMEAR: NORMAL
TIBC SERPL-MCNC: 291 MCG/DL (ref 298–536)
TRANSFERRIN SERPL-MCNC: 195 MG/DL (ref 200–360)
TRIGL SERPL-MCNC: 64 MG/DL (ref 0–150)
TROPONIN T SERPL-MCNC: 0.07 NG/ML (ref 0–0.03)
TROPONIN T SERPL-MCNC: 0.09 NG/ML (ref 0–0.03)
TSH SERPL DL<=0.05 MIU/L-ACNC: 5.72 MIU/ML (ref 0.27–4.2)
TTG IGA SER-ACNC: <2 U/ML (ref 0–3)
TTG IGG SER-ACNC: <2 U/ML (ref 0–5)
UROBILINOGEN UR QL STRIP: ABNORMAL
VANCOMYCIN SERPL-MCNC: 12 MCG/ML (ref 5–40)
VANCOMYCIN SERPL-MCNC: 20.6 MCG/ML (ref 5–40)
VLDLC SERPL-MCNC: 12.8 MG/DL (ref 5–40)
WBC MORPH BLD: NORMAL
WBC MORPH BLD: NORMAL
WBC NRBC COR # BLD: 10.9 10*3/MM3 (ref 4.5–10.7)
WBC NRBC COR # BLD: 11.19 10*3/MM3 (ref 4.5–10.7)
WBC NRBC COR # BLD: 11.58 10*3/MM3 (ref 4.5–10.7)
WBC NRBC COR # BLD: 12.39 10*3/MM3 (ref 4.5–10.7)
WBC NRBC COR # BLD: 12.47 10*3/MM3 (ref 4.5–10.7)
WBC NRBC COR # BLD: 12.7 10*3/MM3 (ref 4.5–10.7)
WBC NRBC COR # BLD: 12.92 10*3/MM3 (ref 4.5–10.7)
WBC NRBC COR # BLD: 13.32 10*3/MM3 (ref 4.5–10.7)
WBC NRBC COR # BLD: 13.73 10*3/MM3 (ref 4.5–10.7)
WBC NRBC COR # BLD: 14.35 10*3/MM3 (ref 4.5–10.7)
WBC NRBC COR # BLD: 14.92 10*3/MM3 (ref 4.5–10.7)
WBC NRBC COR # BLD: 15.87 10*3/MM3 (ref 4.5–10.7)
WBC UR QL AUTO: ABNORMAL /HPF

## 2017-01-01 PROCEDURE — 80202 ASSAY OF VANCOMYCIN: CPT | Performed by: INTERNAL MEDICINE

## 2017-01-01 PROCEDURE — 80074 ACUTE HEPATITIS PANEL: CPT | Performed by: NURSE PRACTITIONER

## 2017-01-01 PROCEDURE — 25010000002 ENOXAPARIN PER 10 MG: Performed by: INTERNAL MEDICINE

## 2017-01-01 PROCEDURE — 93010 ELECTROCARDIOGRAM REPORT: CPT | Performed by: INTERNAL MEDICINE

## 2017-01-01 PROCEDURE — 80053 COMPREHEN METABOLIC PANEL: CPT | Performed by: NURSE PRACTITIONER

## 2017-01-01 PROCEDURE — 83735 ASSAY OF MAGNESIUM: CPT | Performed by: HOSPITALIST

## 2017-01-01 PROCEDURE — 84100 ASSAY OF PHOSPHORUS: CPT | Performed by: INTERNAL MEDICINE

## 2017-01-01 PROCEDURE — 85025 COMPLETE CBC W/AUTO DIFF WBC: CPT | Performed by: INTERNAL MEDICINE

## 2017-01-01 PROCEDURE — 25010000002 ONDANSETRON PER 1 MG: Performed by: INTERNAL MEDICINE

## 2017-01-01 PROCEDURE — 84443 ASSAY THYROID STIM HORMONE: CPT | Performed by: HOSPITALIST

## 2017-01-01 PROCEDURE — 74176 CT ABD & PELVIS W/O CONTRAST: CPT

## 2017-01-01 PROCEDURE — 25010000002 PHENYLEPHRINE PER 1 ML: Performed by: ANESTHESIOLOGY

## 2017-01-01 PROCEDURE — 84145 PROCALCITONIN (PCT): CPT | Performed by: INTERNAL MEDICINE

## 2017-01-01 PROCEDURE — 83690 ASSAY OF LIPASE: CPT | Performed by: HOSPITALIST

## 2017-01-01 PROCEDURE — 25010000002 HYDROMORPHONE PER 4 MG: Performed by: INTERNAL MEDICINE

## 2017-01-01 PROCEDURE — 85007 BL SMEAR W/DIFF WBC COUNT: CPT | Performed by: HOSPITALIST

## 2017-01-01 PROCEDURE — 99232 SBSQ HOSP IP/OBS MODERATE 35: CPT | Performed by: INTERNAL MEDICINE

## 2017-01-01 PROCEDURE — 99222 1ST HOSP IP/OBS MODERATE 55: CPT | Performed by: INTERNAL MEDICINE

## 2017-01-01 PROCEDURE — 99231 SBSQ HOSP IP/OBS SF/LOW 25: CPT | Performed by: INTERNAL MEDICINE

## 2017-01-01 PROCEDURE — 87040 BLOOD CULTURE FOR BACTERIA: CPT | Performed by: INTERNAL MEDICINE

## 2017-01-01 PROCEDURE — 85025 COMPLETE CBC W/AUTO DIFF WBC: CPT | Performed by: HOSPITALIST

## 2017-01-01 PROCEDURE — 85610 PROTHROMBIN TIME: CPT | Performed by: INTERNAL MEDICINE

## 2017-01-01 PROCEDURE — 25010000002 MORPHINE PER 10 MG: Performed by: HOSPITALIST

## 2017-01-01 PROCEDURE — 83516 IMMUNOASSAY NONANTIBODY: CPT | Performed by: INTERNAL MEDICINE

## 2017-01-01 PROCEDURE — 71010 HC CHEST PA OR AP: CPT

## 2017-01-01 PROCEDURE — 84155 ASSAY OF PROTEIN SERUM: CPT | Performed by: INTERNAL MEDICINE

## 2017-01-01 PROCEDURE — 80076 HEPATIC FUNCTION PANEL: CPT | Performed by: HOSPITALIST

## 2017-01-01 PROCEDURE — 87449 NOS EACH ORGANISM AG IA: CPT | Performed by: HOSPITALIST

## 2017-01-01 PROCEDURE — 87633 RESP VIRUS 12-25 TARGETS: CPT | Performed by: EMERGENCY MEDICINE

## 2017-01-01 PROCEDURE — 25010000002 LORAZEPAM PER 2 MG: Performed by: INTERNAL MEDICINE

## 2017-01-01 PROCEDURE — 82330 ASSAY OF CALCIUM: CPT | Performed by: INTERNAL MEDICINE

## 2017-01-01 PROCEDURE — 71020 HC CHEST PA AND LATERAL: CPT

## 2017-01-01 PROCEDURE — 87581 M.PNEUMON DNA AMP PROBE: CPT | Performed by: HOSPITALIST

## 2017-01-01 PROCEDURE — 36415 COLL VENOUS BLD VENIPUNCTURE: CPT

## 2017-01-01 PROCEDURE — 82103 ALPHA-1-ANTITRYPSIN TOTAL: CPT | Performed by: INTERNAL MEDICINE

## 2017-01-01 PROCEDURE — 80053 COMPREHEN METABOLIC PANEL: CPT | Performed by: INTERNAL MEDICINE

## 2017-01-01 PROCEDURE — 82728 ASSAY OF FERRITIN: CPT | Performed by: INTERNAL MEDICINE

## 2017-01-01 PROCEDURE — 87086 URINE CULTURE/COLONY COUNT: CPT | Performed by: EMERGENCY MEDICINE

## 2017-01-01 PROCEDURE — 87798 DETECT AGENT NOS DNA AMP: CPT | Performed by: EMERGENCY MEDICINE

## 2017-01-01 PROCEDURE — 83605 ASSAY OF LACTIC ACID: CPT | Performed by: INTERNAL MEDICINE

## 2017-01-01 PROCEDURE — 80053 COMPREHEN METABOLIC PANEL: CPT | Performed by: HOSPITALIST

## 2017-01-01 PROCEDURE — 82390 ASSAY OF CERULOPLASMIN: CPT | Performed by: INTERNAL MEDICINE

## 2017-01-01 PROCEDURE — 82962 GLUCOSE BLOOD TEST: CPT

## 2017-01-01 PROCEDURE — 83735 ASSAY OF MAGNESIUM: CPT | Performed by: EMERGENCY MEDICINE

## 2017-01-01 PROCEDURE — 76775 US EXAM ABDO BACK WALL LIM: CPT

## 2017-01-01 PROCEDURE — 93005 ELECTROCARDIOGRAM TRACING: CPT | Performed by: HOSPITALIST

## 2017-01-01 PROCEDURE — 80048 BASIC METABOLIC PNL TOTAL CA: CPT | Performed by: INTERNAL MEDICINE

## 2017-01-01 PROCEDURE — 87486 CHLMYD PNEUM DNA AMP PROBE: CPT | Performed by: HOSPITALIST

## 2017-01-01 PROCEDURE — 85025 COMPLETE CBC W/AUTO DIFF WBC: CPT | Performed by: EMERGENCY MEDICINE

## 2017-01-01 PROCEDURE — 87581 M.PNEUMON DNA AMP PROBE: CPT | Performed by: EMERGENCY MEDICINE

## 2017-01-01 PROCEDURE — 84484 ASSAY OF TROPONIN QUANT: CPT | Performed by: INTERNAL MEDICINE

## 2017-01-01 PROCEDURE — 97163 PT EVAL HIGH COMPLEX 45 MIN: CPT

## 2017-01-01 PROCEDURE — 93005 ELECTROCARDIOGRAM TRACING: CPT | Performed by: INTERNAL MEDICINE

## 2017-01-01 PROCEDURE — 74000 HC ABDOMEN KUB: CPT

## 2017-01-01 PROCEDURE — 99238 HOSP IP/OBS DSCHRG MGMT 30/<: CPT | Performed by: INTERNAL MEDICINE

## 2017-01-01 PROCEDURE — 99283 EMERGENCY DEPT VISIT LOW MDM: CPT

## 2017-01-01 PROCEDURE — 25010000002 PROPOFOL 10 MG/ML EMULSION: Performed by: ANESTHESIOLOGY

## 2017-01-01 PROCEDURE — 25010000002 ONDANSETRON PER 1 MG: Performed by: HOSPITALIST

## 2017-01-01 PROCEDURE — 25010000002 LORAZEPAM PER 2 MG: Performed by: HOSPITALIST

## 2017-01-01 PROCEDURE — 87077 CULTURE AEROBIC IDENTIFY: CPT | Performed by: EMERGENCY MEDICINE

## 2017-01-01 PROCEDURE — 25010000002 VANCOMYCIN PER 500 MG: Performed by: INTERNAL MEDICINE

## 2017-01-01 PROCEDURE — 84466 ASSAY OF TRANSFERRIN: CPT | Performed by: INTERNAL MEDICINE

## 2017-01-01 PROCEDURE — 80053 COMPREHEN METABOLIC PANEL: CPT | Performed by: EMERGENCY MEDICINE

## 2017-01-01 PROCEDURE — 83735 ASSAY OF MAGNESIUM: CPT | Performed by: INTERNAL MEDICINE

## 2017-01-01 PROCEDURE — 83690 ASSAY OF LIPASE: CPT | Performed by: INTERNAL MEDICINE

## 2017-01-01 PROCEDURE — 82150 ASSAY OF AMYLASE: CPT | Performed by: INTERNAL MEDICINE

## 2017-01-01 PROCEDURE — 84484 ASSAY OF TROPONIN QUANT: CPT | Performed by: HOSPITALIST

## 2017-01-01 PROCEDURE — 80061 LIPID PANEL: CPT | Performed by: NURSE PRACTITIONER

## 2017-01-01 PROCEDURE — 86038 ANTINUCLEAR ANTIBODIES: CPT | Performed by: INTERNAL MEDICINE

## 2017-01-01 PROCEDURE — 71250 CT THORAX DX C-: CPT

## 2017-01-01 PROCEDURE — 76705 ECHO EXAM OF ABDOMEN: CPT

## 2017-01-01 PROCEDURE — 87798 DETECT AGENT NOS DNA AMP: CPT | Performed by: HOSPITALIST

## 2017-01-01 PROCEDURE — 85027 COMPLETE CBC AUTOMATED: CPT | Performed by: HOSPITALIST

## 2017-01-01 PROCEDURE — 84165 PROTEIN E-PHORESIS SERUM: CPT | Performed by: INTERNAL MEDICINE

## 2017-01-01 PROCEDURE — 82977 ASSAY OF GGT: CPT | Performed by: INTERNAL MEDICINE

## 2017-01-01 PROCEDURE — 25010000002 MORPHINE PER 10 MG: Performed by: INTERNAL MEDICINE

## 2017-01-01 PROCEDURE — 85730 THROMBOPLASTIN TIME PARTIAL: CPT | Performed by: INTERNAL MEDICINE

## 2017-01-01 PROCEDURE — 25010000002 ZOLEDRONIC ACID PER 1 MG: Performed by: INTERNAL MEDICINE

## 2017-01-01 PROCEDURE — 87186 SC STD MICRODIL/AGAR DIL: CPT | Performed by: EMERGENCY MEDICINE

## 2017-01-01 PROCEDURE — 87486 CHLMYD PNEUM DNA AMP PROBE: CPT | Performed by: EMERGENCY MEDICINE

## 2017-01-01 PROCEDURE — 87633 RESP VIRUS 12-25 TARGETS: CPT | Performed by: HOSPITALIST

## 2017-01-01 PROCEDURE — 84484 ASSAY OF TROPONIN QUANT: CPT | Performed by: EMERGENCY MEDICINE

## 2017-01-01 PROCEDURE — 25510000001 DIATRIZOATE MEGLUMINE & SODIUM PER 1 ML: Performed by: INTERNAL MEDICINE

## 2017-01-01 PROCEDURE — 93005 ELECTROCARDIOGRAM TRACING: CPT | Performed by: EMERGENCY MEDICINE

## 2017-01-01 PROCEDURE — 82150 ASSAY OF AMYLASE: CPT | Performed by: HOSPITALIST

## 2017-01-01 PROCEDURE — 97110 THERAPEUTIC EXERCISES: CPT

## 2017-01-01 PROCEDURE — 99284 EMERGENCY DEPT VISIT MOD MDM: CPT

## 2017-01-01 PROCEDURE — 99223 1ST HOSP IP/OBS HIGH 75: CPT | Performed by: INTERNAL MEDICINE

## 2017-01-01 PROCEDURE — 83540 ASSAY OF IRON: CPT | Performed by: INTERNAL MEDICINE

## 2017-01-01 PROCEDURE — 87081 CULTURE SCREEN ONLY: CPT | Performed by: HOSPITALIST

## 2017-01-01 PROCEDURE — 80069 RENAL FUNCTION PANEL: CPT | Performed by: INTERNAL MEDICINE

## 2017-01-01 PROCEDURE — 85007 BL SMEAR W/DIFF WBC COUNT: CPT | Performed by: INTERNAL MEDICINE

## 2017-01-01 PROCEDURE — 81001 URINALYSIS AUTO W/SCOPE: CPT | Performed by: EMERGENCY MEDICINE

## 2017-01-01 PROCEDURE — 25010000002 VANCOMYCIN PER 500 MG: Performed by: EMERGENCY MEDICINE

## 2017-01-01 PROCEDURE — 87899 AGENT NOS ASSAY W/OPTIC: CPT | Performed by: HOSPITALIST

## 2017-01-01 PROCEDURE — 85025 COMPLETE CBC W/AUTO DIFF WBC: CPT | Performed by: NURSE PRACTITIONER

## 2017-01-01 PROCEDURE — 99233 SBSQ HOSP IP/OBS HIGH 50: CPT | Performed by: INTERNAL MEDICINE

## 2017-01-01 RX ORDER — LORAZEPAM 2 MG/ML
0.5 CONCENTRATE ORAL
Status: DISCONTINUED | OUTPATIENT
Start: 2017-01-01 | End: 2017-01-01 | Stop reason: HOSPADM

## 2017-01-01 RX ORDER — POTASSIUM CHLORIDE 750 MG/1
10 TABLET, FILM COATED, EXTENDED RELEASE ORAL DAILY
COMMUNITY

## 2017-01-01 RX ORDER — IPRATROPIUM BROMIDE AND ALBUTEROL SULFATE 2.5; .5 MG/3ML; MG/3ML
3 SOLUTION RESPIRATORY (INHALATION) EVERY 4 HOURS PRN
Status: DISCONTINUED | OUTPATIENT
Start: 2017-01-01 | End: 2017-01-01 | Stop reason: HOSPADM

## 2017-01-01 RX ORDER — ACETAMINOPHEN 650 MG/1
650 SUPPOSITORY RECTAL EVERY 4 HOURS PRN
Status: DISCONTINUED | OUTPATIENT
Start: 2017-01-01 | End: 2017-01-01 | Stop reason: HOSPADM

## 2017-01-01 RX ORDER — LORAZEPAM 2 MG/ML
2 INJECTION INTRAMUSCULAR
Status: DISCONTINUED | OUTPATIENT
Start: 2017-01-01 | End: 2017-01-01 | Stop reason: HOSPADM

## 2017-01-01 RX ORDER — SODIUM CHLORIDE 0.9 % (FLUSH) 0.9 %
10 SYRINGE (ML) INJECTION AS NEEDED
Status: DISCONTINUED | OUTPATIENT
Start: 2017-01-01 | End: 2017-01-01 | Stop reason: HOSPADM

## 2017-01-01 RX ORDER — LORAZEPAM 1 MG/1
2 TABLET ORAL
Status: CANCELLED | OUTPATIENT
Start: 2017-01-01 | End: 2017-03-26

## 2017-01-01 RX ORDER — MORPHINE SULFATE 100 MG/5ML
5 SOLUTION ORAL
Status: CANCELLED | OUTPATIENT
Start: 2017-01-01 | End: 2017-03-25

## 2017-01-01 RX ORDER — LORAZEPAM 2 MG/ML
2 INJECTION INTRAMUSCULAR
Status: DISCONTINUED | OUTPATIENT
Start: 2017-01-01 | End: 2017-01-01

## 2017-01-01 RX ORDER — LORAZEPAM 2 MG/ML
0.5 INJECTION INTRAMUSCULAR
Status: DISCONTINUED | OUTPATIENT
Start: 2017-01-01 | End: 2017-01-01 | Stop reason: HOSPADM

## 2017-01-01 RX ORDER — PROMETHAZINE HYDROCHLORIDE 25 MG/1
12.5 TABLET ORAL EVERY 4 HOURS PRN
COMMUNITY

## 2017-01-01 RX ORDER — PETROLATUM,WHITE
OINTMENT IN PACKET (GRAM) TOPICAL DAILY
COMMUNITY

## 2017-01-01 RX ORDER — LORAZEPAM 2 MG/ML
0.5 CONCENTRATE ORAL
Status: CANCELLED | OUTPATIENT
Start: 2017-01-01 | End: 2017-03-26

## 2017-01-01 RX ORDER — ACETAMINOPHEN 160 MG/5ML
650 SOLUTION ORAL EVERY 4 HOURS PRN
Status: DISCONTINUED | OUTPATIENT
Start: 2017-01-01 | End: 2017-01-01 | Stop reason: HOSPADM

## 2017-01-01 RX ORDER — LEVOTHYROXINE SODIUM 0.07 MG/1
75 TABLET ORAL
Status: DISCONTINUED | OUTPATIENT
Start: 2017-01-01 | End: 2017-01-01

## 2017-01-01 RX ORDER — LORAZEPAM 2 MG/ML
2 CONCENTRATE ORAL
Status: DISCONTINUED | OUTPATIENT
Start: 2017-01-01 | End: 2017-01-01

## 2017-01-01 RX ORDER — PROPOFOL 10 MG/ML
VIAL (ML) INTRAVENOUS AS NEEDED
Status: DISCONTINUED | OUTPATIENT
Start: 2017-01-01 | End: 2017-01-01 | Stop reason: SURG

## 2017-01-01 RX ORDER — ACETAMINOPHEN 325 MG/1
650 TABLET ORAL EVERY 4 HOURS PRN
Status: DISCONTINUED | OUTPATIENT
Start: 2017-01-01 | End: 2017-01-01 | Stop reason: HOSPADM

## 2017-01-01 RX ORDER — LORAZEPAM 0.5 MG/1
0.5 TABLET ORAL
Status: DISCONTINUED | OUTPATIENT
Start: 2017-01-01 | End: 2017-01-01 | Stop reason: HOSPADM

## 2017-01-01 RX ORDER — MORPHINE SULFATE 100 MG/5ML
20 SOLUTION ORAL
Status: DISCONTINUED | OUTPATIENT
Start: 2017-01-01 | End: 2017-01-01 | Stop reason: HOSPADM

## 2017-01-01 RX ORDER — LORAZEPAM 2 MG/ML
0.5 CONCENTRATE ORAL
Status: DISCONTINUED | OUTPATIENT
Start: 2017-01-01 | End: 2017-01-01

## 2017-01-01 RX ORDER — MORPHINE SULFATE 2 MG/ML
2 INJECTION, SOLUTION INTRAMUSCULAR; INTRAVENOUS
Status: DISCONTINUED | OUTPATIENT
Start: 2017-01-01 | End: 2017-01-01 | Stop reason: HOSPADM

## 2017-01-01 RX ORDER — LORAZEPAM 2 MG/ML
1 CONCENTRATE ORAL
Status: DISCONTINUED | OUTPATIENT
Start: 2017-01-01 | End: 2017-01-01 | Stop reason: HOSPADM

## 2017-01-01 RX ORDER — LORAZEPAM 2 MG/ML
2 CONCENTRATE ORAL
Status: CANCELLED | OUTPATIENT
Start: 2017-01-01 | End: 2017-03-26

## 2017-01-01 RX ORDER — ONDANSETRON 2 MG/ML
4 INJECTION INTRAMUSCULAR; INTRAVENOUS EVERY 6 HOURS PRN
Status: DISCONTINUED | OUTPATIENT
Start: 2017-01-01 | End: 2017-01-01 | Stop reason: HOSPADM

## 2017-01-01 RX ORDER — LORAZEPAM 2 MG/ML
0.5 INJECTION INTRAMUSCULAR
Status: DISCONTINUED | OUTPATIENT
Start: 2017-01-01 | End: 2017-01-01

## 2017-01-01 RX ORDER — ONDANSETRON 4 MG/1
4 TABLET, FILM COATED ORAL EVERY 6 HOURS PRN
Status: DISCONTINUED | OUTPATIENT
Start: 2017-01-01 | End: 2017-01-01 | Stop reason: HOSPADM

## 2017-01-01 RX ORDER — ONDANSETRON 2 MG/ML
4 INJECTION INTRAMUSCULAR; INTRAVENOUS EVERY 6 HOURS PRN
Status: CANCELLED | OUTPATIENT
Start: 2017-01-01

## 2017-01-01 RX ORDER — LORAZEPAM 1 MG/1
2 TABLET ORAL
Status: DISCONTINUED | OUTPATIENT
Start: 2017-01-01 | End: 2017-01-01 | Stop reason: HOSPADM

## 2017-01-01 RX ORDER — IPRATROPIUM BROMIDE AND ALBUTEROL SULFATE 2.5; .5 MG/3ML; MG/3ML
3 SOLUTION RESPIRATORY (INHALATION) EVERY 4 HOURS PRN
Status: CANCELLED | OUTPATIENT
Start: 2017-01-01

## 2017-01-01 RX ORDER — DILTIAZEM HYDROCHLORIDE 120 MG/1
120 CAPSULE, COATED, EXTENDED RELEASE ORAL
Status: DISCONTINUED | OUTPATIENT
Start: 2017-01-01 | End: 2017-01-01

## 2017-01-01 RX ORDER — MORPHINE SULFATE 100 MG/5ML
5 SOLUTION ORAL
Status: DISCONTINUED | OUTPATIENT
Start: 2017-01-01 | End: 2017-01-01 | Stop reason: HOSPADM

## 2017-01-01 RX ORDER — SODIUM CHLORIDE 0.9 % (FLUSH) 0.9 %
1-10 SYRINGE (ML) INJECTION AS NEEDED
Status: DISCONTINUED | OUTPATIENT
Start: 2017-01-01 | End: 2017-01-01

## 2017-01-01 RX ORDER — PHENAZOPYRIDINE HYDROCHLORIDE 100 MG/1
100 TABLET, FILM COATED ORAL EVERY OTHER DAY
Status: DISCONTINUED | OUTPATIENT
Start: 2017-01-01 | End: 2017-01-01

## 2017-01-01 RX ORDER — MORPHINE SULFATE 100 MG/5ML
5 SOLUTION ORAL
Status: DISCONTINUED | OUTPATIENT
Start: 2017-01-01 | End: 2017-01-01

## 2017-01-01 RX ORDER — LORAZEPAM 2 MG/ML
0.5 INJECTION INTRAMUSCULAR
Status: CANCELLED | OUTPATIENT
Start: 2017-01-01 | End: 2017-03-26

## 2017-01-01 RX ORDER — LEVOTHYROXINE SODIUM 0.07 MG/1
75 TABLET ORAL DAILY
COMMUNITY

## 2017-01-01 RX ORDER — NITROGLYCERIN 0.4 MG/1
0.4 TABLET SUBLINGUAL
Status: DISCONTINUED | OUTPATIENT
Start: 2017-01-01 | End: 2017-01-01 | Stop reason: HOSPADM

## 2017-01-01 RX ORDER — POTASSIUM CHLORIDE 750 MG/1
40 CAPSULE, EXTENDED RELEASE ORAL ONCE
Status: COMPLETED | OUTPATIENT
Start: 2017-01-01 | End: 2017-01-01

## 2017-01-01 RX ORDER — GLYCOPYRROLATE 0.2 MG/ML
INJECTION INTRAMUSCULAR; INTRAVENOUS AS NEEDED
Status: DISCONTINUED | OUTPATIENT
Start: 2017-01-01 | End: 2017-01-01 | Stop reason: SURG

## 2017-01-01 RX ORDER — BUMETANIDE 0.25 MG/ML
1 INJECTION INTRAMUSCULAR; INTRAVENOUS 2 TIMES DAILY
Status: DISCONTINUED | OUTPATIENT
Start: 2017-01-01 | End: 2017-01-01

## 2017-01-01 RX ORDER — LORAZEPAM 0.5 MG/1
0.5 TABLET ORAL
Status: CANCELLED | OUTPATIENT
Start: 2017-01-01 | End: 2017-03-26

## 2017-01-01 RX ORDER — LORAZEPAM 1 MG/1
1 TABLET ORAL
Status: DISCONTINUED | OUTPATIENT
Start: 2017-01-01 | End: 2017-01-01 | Stop reason: HOSPADM

## 2017-01-01 RX ORDER — MORPHINE SULFATE 100 MG/5ML
10 SOLUTION ORAL
Status: DISCONTINUED | OUTPATIENT
Start: 2017-01-01 | End: 2017-01-01 | Stop reason: HOSPADM

## 2017-01-01 RX ORDER — MIDAZOLAM HYDROCHLORIDE 1 MG/ML
INJECTION INTRAMUSCULAR; INTRAVENOUS AS NEEDED
Status: DISCONTINUED | OUTPATIENT
Start: 2017-01-01 | End: 2017-01-01

## 2017-01-01 RX ORDER — DOCUSATE SODIUM 100 MG/1
100 CAPSULE, LIQUID FILLED ORAL 2 TIMES DAILY
Status: CANCELLED | OUTPATIENT
Start: 2017-01-01

## 2017-01-01 RX ORDER — BUMETANIDE 0.25 MG/ML
0.5 INJECTION INTRAMUSCULAR; INTRAVENOUS ONCE
Status: COMPLETED | OUTPATIENT
Start: 2017-01-01 | End: 2017-01-01

## 2017-01-01 RX ORDER — BUMETANIDE 1 MG/1
1 TABLET ORAL DAILY
Status: DISCONTINUED | OUTPATIENT
Start: 2017-01-01 | End: 2017-01-01

## 2017-01-01 RX ORDER — SODIUM CHLORIDE 0.9 % (FLUSH) 0.9 %
1-10 SYRINGE (ML) INJECTION AS NEEDED
Status: DISCONTINUED | OUTPATIENT
Start: 2017-01-01 | End: 2017-01-01 | Stop reason: HOSPADM

## 2017-01-01 RX ORDER — BUMETANIDE 1 MG/1
1 TABLET ORAL DAILY
COMMUNITY

## 2017-01-01 RX ORDER — VANCOMYCIN HYDROCHLORIDE 1 G/200ML
1000 INJECTION, SOLUTION INTRAVENOUS ONCE
Status: COMPLETED | OUTPATIENT
Start: 2017-01-01 | End: 2017-01-01

## 2017-01-01 RX ORDER — LORAZEPAM 2 MG/ML
2 INJECTION INTRAMUSCULAR
Status: CANCELLED | OUTPATIENT
Start: 2017-01-01 | End: 2017-03-26

## 2017-01-01 RX ORDER — ACETAMINOPHEN 325 MG/1
650 TABLET ORAL EVERY 4 HOURS PRN
Status: DISCONTINUED | OUTPATIENT
Start: 2017-01-01 | End: 2017-01-01

## 2017-01-01 RX ORDER — MORPHINE SULFATE 100 MG/5ML
20 SOLUTION ORAL
Status: CANCELLED | OUTPATIENT
Start: 2017-01-01 | End: 2017-03-26

## 2017-01-01 RX ORDER — DIPHENOXYLATE HYDROCHLORIDE AND ATROPINE SULFATE 2.5; .025 MG/1; MG/1
1 TABLET ORAL
Status: DISCONTINUED | OUTPATIENT
Start: 2017-01-01 | End: 2017-01-01 | Stop reason: HOSPADM

## 2017-01-01 RX ORDER — SENNA AND DOCUSATE SODIUM 50; 8.6 MG/1; MG/1
2 TABLET, FILM COATED ORAL NIGHTLY PRN
Status: CANCELLED | OUTPATIENT
Start: 2017-01-01

## 2017-01-01 RX ORDER — AMIODARONE HYDROCHLORIDE 200 MG/1
200 TABLET ORAL 2 TIMES DAILY
Status: DISCONTINUED | OUTPATIENT
Start: 2017-01-01 | End: 2017-01-01

## 2017-01-01 RX ORDER — OXYCODONE HYDROCHLORIDE AND ACETAMINOPHEN 5; 325 MG/1; MG/1
1 TABLET ORAL EVERY 4 HOURS PRN
Status: DISPENSED | OUTPATIENT
Start: 2017-01-01 | End: 2017-01-01

## 2017-01-01 RX ORDER — LORAZEPAM 1 MG/1
1 TABLET ORAL
Status: CANCELLED | OUTPATIENT
Start: 2017-01-01 | End: 2017-03-26

## 2017-01-01 RX ORDER — LORAZEPAM 2 MG/ML
2 CONCENTRATE ORAL
Status: DISCONTINUED | OUTPATIENT
Start: 2017-01-01 | End: 2017-01-01 | Stop reason: HOSPADM

## 2017-01-01 RX ORDER — SODIUM POLYSTYRENE SULFONATE 15 G/60ML
15 SUSPENSION ORAL; RECTAL ONCE
Status: COMPLETED | OUTPATIENT
Start: 2017-01-01 | End: 2017-01-01

## 2017-01-01 RX ORDER — BUMETANIDE 0.5 MG/1
0.5 TABLET ORAL ONCE
Status: COMPLETED | OUTPATIENT
Start: 2017-01-01 | End: 2017-01-01

## 2017-01-01 RX ORDER — SODIUM CHLORIDE 0.9 % (FLUSH) 0.9 %
1-10 SYRINGE (ML) INJECTION AS NEEDED
Status: CANCELLED | OUTPATIENT
Start: 2017-01-01

## 2017-01-01 RX ORDER — SENNA AND DOCUSATE SODIUM 50; 8.6 MG/1; MG/1
2 TABLET, FILM COATED ORAL NIGHTLY PRN
Status: DISCONTINUED | OUTPATIENT
Start: 2017-01-01 | End: 2017-01-01

## 2017-01-01 RX ORDER — BUMETANIDE 0.25 MG/ML
0.5 INJECTION INTRAMUSCULAR; INTRAVENOUS ONCE
Status: DISCONTINUED | OUTPATIENT
Start: 2017-01-01 | End: 2017-01-01

## 2017-01-01 RX ORDER — LORAZEPAM 2 MG/ML
1 INJECTION INTRAMUSCULAR
Status: DISCONTINUED | OUTPATIENT
Start: 2017-01-01 | End: 2017-01-01 | Stop reason: HOSPADM

## 2017-01-01 RX ORDER — PHENAZOPYRIDINE HYDROCHLORIDE 200 MG/1
200 TABLET, FILM COATED ORAL 3 TIMES DAILY PRN
Status: DISCONTINUED | OUTPATIENT
Start: 2017-01-01 | End: 2017-01-01

## 2017-01-01 RX ORDER — DILTIAZEM HYDROCHLORIDE 120 MG/1
120 CAPSULE, COATED, EXTENDED RELEASE ORAL
Status: DISCONTINUED | OUTPATIENT
Start: 2017-01-01 | End: 2017-01-01 | Stop reason: HOSPADM

## 2017-01-01 RX ORDER — OXYCODONE HYDROCHLORIDE AND ACETAMINOPHEN 5; 325 MG/1; MG/1
1 TABLET ORAL EVERY 4 HOURS PRN
Status: CANCELLED | OUTPATIENT
Start: 2017-01-01 | End: 2017-04-03

## 2017-01-01 RX ORDER — BUMETANIDE 1 MG/1
1 TABLET ORAL DAILY
Status: CANCELLED | OUTPATIENT
Start: 2017-01-01

## 2017-01-01 RX ORDER — ONDANSETRON 4 MG/1
4 TABLET, FILM COATED ORAL EVERY 6 HOURS PRN
Status: CANCELLED | OUTPATIENT
Start: 2017-01-01

## 2017-01-01 RX ORDER — PROPOFOL 10 MG/ML
VIAL (ML) INTRAVENOUS CONTINUOUS PRN
Status: DISCONTINUED | OUTPATIENT
Start: 2017-01-01 | End: 2017-01-01 | Stop reason: SURG

## 2017-01-01 RX ORDER — SODIUM CHLORIDE 0.9 % (FLUSH) 0.9 %
10 SYRINGE (ML) INJECTION AS NEEDED
Status: CANCELLED | OUTPATIENT
Start: 2017-01-01

## 2017-01-01 RX ORDER — DIPHENOXYLATE HYDROCHLORIDE AND ATROPINE SULFATE 2.5; .025 MG/1; MG/1
1 TABLET ORAL
Status: CANCELLED | OUTPATIENT
Start: 2017-01-01

## 2017-01-01 RX ORDER — LORAZEPAM 2 MG/ML
1 CONCENTRATE ORAL
Status: CANCELLED | OUTPATIENT
Start: 2017-01-01 | End: 2017-03-26

## 2017-01-01 RX ORDER — ONDANSETRON 4 MG/1
4 TABLET, ORALLY DISINTEGRATING ORAL EVERY 6 HOURS PRN
Status: DISCONTINUED | OUTPATIENT
Start: 2017-01-01 | End: 2017-01-01 | Stop reason: HOSPADM

## 2017-01-01 RX ORDER — LIDOCAINE HYDROCHLORIDE 20 MG/ML
INJECTION, SOLUTION INFILTRATION; PERINEURAL AS NEEDED
Status: DISCONTINUED | OUTPATIENT
Start: 2017-01-01 | End: 2017-01-01 | Stop reason: SURG

## 2017-01-01 RX ORDER — OXYCODONE HYDROCHLORIDE AND ACETAMINOPHEN 5; 325 MG/1; MG/1
1 TABLET ORAL EVERY 4 HOURS PRN
Status: DISCONTINUED | OUTPATIENT
Start: 2017-01-01 | End: 2017-01-01 | Stop reason: HOSPADM

## 2017-01-01 RX ORDER — HYDROMORPHONE HYDROCHLORIDE 1 MG/ML
0.5 INJECTION, SOLUTION INTRAMUSCULAR; INTRAVENOUS; SUBCUTANEOUS
Status: DISPENSED | OUTPATIENT
Start: 2017-01-01 | End: 2017-01-01

## 2017-01-01 RX ORDER — LORAZEPAM 2 MG/ML
1 INJECTION INTRAMUSCULAR
Status: CANCELLED | OUTPATIENT
Start: 2017-01-01 | End: 2017-03-26

## 2017-01-01 RX ORDER — SODIUM CHLORIDE 9 MG/ML
50 INJECTION, SOLUTION INTRAVENOUS CONTINUOUS
Status: DISCONTINUED | OUTPATIENT
Start: 2017-01-01 | End: 2017-01-01

## 2017-01-01 RX ORDER — VANCOMYCIN HYDROCHLORIDE 1 G/200ML
20 INJECTION, SOLUTION INTRAVENOUS ONCE
Status: COMPLETED | OUTPATIENT
Start: 2017-01-01 | End: 2017-01-01

## 2017-01-01 RX ORDER — MORPHINE SULFATE 100 MG/5ML
10 SOLUTION ORAL
Status: CANCELLED | OUTPATIENT
Start: 2017-01-01 | End: 2017-03-26

## 2017-01-01 RX ORDER — SENNA AND DOCUSATE SODIUM 50; 8.6 MG/1; MG/1
2 TABLET, FILM COATED ORAL NIGHTLY PRN
Status: DISCONTINUED | OUTPATIENT
Start: 2017-01-01 | End: 2017-01-01 | Stop reason: HOSPADM

## 2017-01-01 RX ORDER — ACETAMINOPHEN 325 MG/1
650 TABLET ORAL EVERY 4 HOURS PRN
Status: CANCELLED | OUTPATIENT
Start: 2017-01-01

## 2017-01-01 RX ORDER — FLUTICASONE PROPIONATE 50 MCG
2 SPRAY, SUSPENSION (ML) NASAL DAILY
COMMUNITY

## 2017-01-01 RX ORDER — BUMETANIDE 0.25 MG/ML
1 INJECTION INTRAMUSCULAR; INTRAVENOUS DAILY
Status: DISCONTINUED | OUTPATIENT
Start: 2017-01-01 | End: 2017-01-01

## 2017-01-01 RX ORDER — ACETAMINOPHEN 160 MG/5ML
650 SOLUTION ORAL EVERY 4 HOURS PRN
Status: CANCELLED | OUTPATIENT
Start: 2017-01-01

## 2017-01-01 RX ORDER — CALCITONIN SALMON 200 [USP'U]/ML
50 INJECTION, SOLUTION INTRAMUSCULAR; SUBCUTANEOUS 2 TIMES DAILY
Status: DISCONTINUED | OUTPATIENT
Start: 2017-01-01 | End: 2017-01-01

## 2017-01-01 RX ORDER — DOCUSATE SODIUM 100 MG/1
100 CAPSULE, LIQUID FILLED ORAL 2 TIMES DAILY
Status: DISCONTINUED | OUTPATIENT
Start: 2017-01-01 | End: 2017-01-01

## 2017-01-01 RX ORDER — DILTIAZEM HYDROCHLORIDE 120 MG/1
120 CAPSULE, COATED, EXTENDED RELEASE ORAL
Status: CANCELLED | OUTPATIENT
Start: 2017-01-01

## 2017-01-01 RX ORDER — LORAZEPAM 2 MG/ML
1 INJECTION INTRAMUSCULAR
Status: DISCONTINUED | OUTPATIENT
Start: 2017-01-01 | End: 2017-01-01

## 2017-01-01 RX ORDER — FLUTICASONE PROPIONATE 50 MCG
2 SPRAY, SUSPENSION (ML) NASAL DAILY
Status: CANCELLED | OUTPATIENT
Start: 2017-01-01

## 2017-01-01 RX ORDER — SODIUM CHLORIDE 9 MG/ML
125 INJECTION, SOLUTION INTRAVENOUS CONTINUOUS
Status: DISCONTINUED | OUTPATIENT
Start: 2017-01-01 | End: 2017-01-01

## 2017-01-01 RX ORDER — BISACODYL 10 MG
10 SUPPOSITORY, RECTAL RECTAL ONCE
Status: DISCONTINUED | OUTPATIENT
Start: 2017-01-01 | End: 2017-01-01

## 2017-01-01 RX ORDER — SACCHAROMYCES BOULARDII 250 MG
250 CAPSULE ORAL 2 TIMES DAILY
Status: DISCONTINUED | OUTPATIENT
Start: 2017-01-01 | End: 2017-01-01 | Stop reason: HOSPADM

## 2017-01-01 RX ORDER — DOCUSATE SODIUM 100 MG/1
100 CAPSULE, LIQUID FILLED ORAL 2 TIMES DAILY
Status: DISCONTINUED | OUTPATIENT
Start: 2017-01-01 | End: 2017-01-01 | Stop reason: HOSPADM

## 2017-01-01 RX ORDER — MORPHINE SULFATE 10 MG/ML
6 INJECTION INTRAMUSCULAR; INTRAVENOUS; SUBCUTANEOUS
Status: DISCONTINUED | OUTPATIENT
Start: 2017-01-01 | End: 2017-01-01 | Stop reason: HOSPADM

## 2017-01-01 RX ORDER — TEMAZEPAM 7.5 MG/1
7.5 CAPSULE ORAL ONCE
Status: COMPLETED | OUTPATIENT
Start: 2017-01-01 | End: 2017-01-01

## 2017-01-01 RX ORDER — NALOXONE HCL 0.4 MG/ML
0.4 VIAL (ML) INJECTION
Status: DISCONTINUED | OUTPATIENT
Start: 2017-01-01 | End: 2017-01-01

## 2017-01-01 RX ORDER — BUMETANIDE 0.25 MG/ML
1 INJECTION INTRAMUSCULAR; INTRAVENOUS ONCE
Status: DISCONTINUED | OUTPATIENT
Start: 2017-01-01 | End: 2017-01-01

## 2017-01-01 RX ORDER — FLUTICASONE PROPIONATE 50 MCG
2 SPRAY, SUSPENSION (ML) NASAL DAILY
Status: DISCONTINUED | OUTPATIENT
Start: 2017-01-01 | End: 2017-01-01 | Stop reason: HOSPADM

## 2017-01-01 RX ORDER — MORPHINE SULFATE 10 MG/ML
6 INJECTION INTRAMUSCULAR; INTRAVENOUS; SUBCUTANEOUS
Status: CANCELLED | OUTPATIENT
Start: 2017-01-01 | End: 2017-03-26

## 2017-01-01 RX ORDER — SODIUM CHLORIDE 9 MG/ML
75 INJECTION, SOLUTION INTRAVENOUS CONTINUOUS
Status: DISCONTINUED | OUTPATIENT
Start: 2017-01-01 | End: 2017-01-01

## 2017-01-01 RX ORDER — ACETAMINOPHEN 650 MG/1
650 SUPPOSITORY RECTAL EVERY 4 HOURS PRN
Status: CANCELLED | OUTPATIENT
Start: 2017-01-01

## 2017-01-01 RX ORDER — FLUTICASONE PROPIONATE 50 MCG
2 SPRAY, SUSPENSION (ML) NASAL DAILY
Status: DISCONTINUED | OUTPATIENT
Start: 2017-01-01 | End: 2017-01-01

## 2017-01-01 RX ORDER — BUMETANIDE 1 MG/1
1 TABLET ORAL DAILY
Status: DISCONTINUED | OUTPATIENT
Start: 2017-01-01 | End: 2017-01-01 | Stop reason: HOSPADM

## 2017-01-01 RX ORDER — LORAZEPAM 2 MG/ML
1 CONCENTRATE ORAL
Status: DISCONTINUED | OUTPATIENT
Start: 2017-01-01 | End: 2017-01-01

## 2017-01-01 RX ORDER — ONDANSETRON 4 MG/1
4 TABLET, ORALLY DISINTEGRATING ORAL EVERY 6 HOURS PRN
Status: CANCELLED | OUTPATIENT
Start: 2017-01-01

## 2017-01-01 RX ADMIN — MORPHINE SULFATE 4 MG: 4 INJECTION, SOLUTION INTRAMUSCULAR; INTRAVENOUS at 10:08

## 2017-01-01 RX ADMIN — LORAZEPAM 0.5 MG: 0.5 TABLET ORAL at 15:38

## 2017-01-01 RX ADMIN — SODIUM CHLORIDE 125 ML/HR: 9 INJECTION, SOLUTION INTRAVENOUS at 17:15

## 2017-01-01 RX ADMIN — SODIUM CHLORIDE 75 ML/HR: 9 INJECTION, SOLUTION INTRAVENOUS at 04:50

## 2017-01-01 RX ADMIN — LEVOTHYROXINE SODIUM 75 MCG: 75 TABLET ORAL at 05:31

## 2017-01-01 RX ADMIN — Medication 250 MG: at 08:13

## 2017-01-01 RX ADMIN — Medication 250 MG: at 08:27

## 2017-01-01 RX ADMIN — ONDANSETRON 4 MG: 2 INJECTION INTRAMUSCULAR; INTRAVENOUS at 09:48

## 2017-01-01 RX ADMIN — Medication 250 MG: at 18:48

## 2017-01-01 RX ADMIN — ONDANSETRON 4 MG: 4 TABLET, ORALLY DISINTEGRATING ORAL at 00:26

## 2017-01-01 RX ADMIN — LEVOTHYROXINE SODIUM 75 MCG: 75 TABLET ORAL at 06:32

## 2017-01-01 RX ADMIN — HYDRALAZINE HYDROCHLORIDE 75 MG: 25 TABLET ORAL at 17:12

## 2017-01-01 RX ADMIN — DOCUSATE SODIUM 100 MG: 100 CAPSULE, LIQUID FILLED ORAL at 13:34

## 2017-01-01 RX ADMIN — LORAZEPAM 0.5 MG: 0.5 TABLET ORAL at 13:32

## 2017-01-01 RX ADMIN — BUMETANIDE 0.5 MG: 0.5 TABLET ORAL at 17:22

## 2017-01-01 RX ADMIN — HYDROMORPHONE HYDROCHLORIDE 0.5 MG: 1 INJECTION, SOLUTION INTRAMUSCULAR; INTRAVENOUS; SUBCUTANEOUS at 18:40

## 2017-01-01 RX ADMIN — MORPHINE SULFATE 4 MG: 4 INJECTION, SOLUTION INTRAMUSCULAR; INTRAVENOUS at 04:32

## 2017-01-01 RX ADMIN — HYDROMORPHONE HYDROCHLORIDE 0.5 MG: 1 INJECTION, SOLUTION INTRAMUSCULAR; INTRAVENOUS; SUBCUTANEOUS at 00:29

## 2017-01-01 RX ADMIN — SODIUM CHLORIDE 125 ML/HR: 9 INJECTION, SOLUTION INTRAVENOUS at 04:55

## 2017-01-01 RX ADMIN — DILTIAZEM HYDROCHLORIDE 120 MG: 120 CAPSULE, COATED, EXTENDED RELEASE ORAL at 08:39

## 2017-01-01 RX ADMIN — OXYCODONE HYDROCHLORIDE AND ACETAMINOPHEN 1 TABLET: 5; 325 TABLET ORAL at 21:13

## 2017-01-01 RX ADMIN — ONDANSETRON 4 MG: 2 INJECTION INTRAMUSCULAR; INTRAVENOUS at 22:25

## 2017-01-01 RX ADMIN — MORPHINE SULFATE 4 MG: 4 INJECTION, SOLUTION INTRAMUSCULAR; INTRAVENOUS at 04:20

## 2017-01-01 RX ADMIN — AMIODARONE HYDROCHLORIDE 200 MG: 200 TABLET ORAL at 08:34

## 2017-01-01 RX ADMIN — Medication 250 MG: at 09:15

## 2017-01-01 RX ADMIN — MICONAZOLE NITRATE: 2 POWDER TOPICAL at 18:16

## 2017-01-01 RX ADMIN — HYDROMORPHONE HYDROCHLORIDE 0.5 MG: 1 INJECTION, SOLUTION INTRAMUSCULAR; INTRAVENOUS; SUBCUTANEOUS at 17:52

## 2017-01-01 RX ADMIN — MICONAZOLE NITRATE: 2 POWDER TOPICAL at 20:27

## 2017-01-01 RX ADMIN — MORPHINE SULFATE 4 MG: 4 INJECTION, SOLUTION INTRAMUSCULAR; INTRAVENOUS at 09:11

## 2017-01-01 RX ADMIN — HYDROMORPHONE HYDROCHLORIDE 0.5 MG: 1 INJECTION, SOLUTION INTRAMUSCULAR; INTRAVENOUS; SUBCUTANEOUS at 02:51

## 2017-01-01 RX ADMIN — MORPHINE SULFATE 10 MG: 100 SOLUTION ORAL at 11:28

## 2017-01-01 RX ADMIN — MORPHINE SULFATE 4 MG: 4 INJECTION, SOLUTION INTRAMUSCULAR; INTRAVENOUS at 03:55

## 2017-01-01 RX ADMIN — FLUTICASONE PROPIONATE 2 SPRAY: 50 SPRAY, METERED NASAL at 08:34

## 2017-01-01 RX ADMIN — AMIODARONE HYDROCHLORIDE 200 MG: 200 TABLET ORAL at 10:12

## 2017-01-01 RX ADMIN — HYDRALAZINE HYDROCHLORIDE 75 MG: 25 TABLET ORAL at 17:15

## 2017-01-01 RX ADMIN — HYDRALAZINE HYDROCHLORIDE 75 MG: 25 TABLET ORAL at 09:43

## 2017-01-01 RX ADMIN — FLUTICASONE PROPIONATE 2 SPRAY: 50 SPRAY, METERED NASAL at 08:49

## 2017-01-01 RX ADMIN — DILTIAZEM HYDROCHLORIDE 120 MG: 120 CAPSULE, COATED, EXTENDED RELEASE ORAL at 08:27

## 2017-01-01 RX ADMIN — DILTIAZEM HYDROCHLORIDE 120 MG: 120 CAPSULE, COATED, EXTENDED RELEASE ORAL at 09:42

## 2017-01-01 RX ADMIN — OXYCODONE HYDROCHLORIDE AND ACETAMINOPHEN 1 TABLET: 5; 325 TABLET ORAL at 15:38

## 2017-01-01 RX ADMIN — MORPHINE SULFATE 2 MG: 2 INJECTION, SOLUTION INTRAMUSCULAR; INTRAVENOUS at 16:43

## 2017-01-01 RX ADMIN — MORPHINE SULFATE 4 MG: 4 INJECTION, SOLUTION INTRAMUSCULAR; INTRAVENOUS at 12:04

## 2017-01-01 RX ADMIN — Medication 250 MG: at 09:32

## 2017-01-01 RX ADMIN — MORPHINE SULFATE 4 MG: 4 INJECTION, SOLUTION INTRAMUSCULAR; INTRAVENOUS at 19:58

## 2017-01-01 RX ADMIN — OXYCODONE HYDROCHLORIDE AND ACETAMINOPHEN 1 TABLET: 5; 325 TABLET ORAL at 13:28

## 2017-01-01 RX ADMIN — LORAZEPAM 1 MG: 2 INJECTION, SOLUTION INTRAMUSCULAR; INTRAVENOUS at 14:29

## 2017-01-01 RX ADMIN — AMIODARONE HYDROCHLORIDE 200 MG: 200 TABLET ORAL at 17:11

## 2017-01-01 RX ADMIN — Medication 250 MG: at 08:40

## 2017-01-01 RX ADMIN — HYDROMORPHONE HYDROCHLORIDE 0.5 MG: 1 INJECTION, SOLUTION INTRAMUSCULAR; INTRAVENOUS; SUBCUTANEOUS at 17:15

## 2017-01-01 RX ADMIN — DILTIAZEM HYDROCHLORIDE 120 MG: 120 CAPSULE, COATED, EXTENDED RELEASE ORAL at 08:13

## 2017-01-01 RX ADMIN — MICONAZOLE NITRATE: 2 POWDER TOPICAL at 20:13

## 2017-01-01 RX ADMIN — ONDANSETRON 4 MG: 2 INJECTION INTRAMUSCULAR; INTRAVENOUS at 10:08

## 2017-01-01 RX ADMIN — LEVOTHYROXINE SODIUM 75 MCG: 75 TABLET ORAL at 06:38

## 2017-01-01 RX ADMIN — OXYCODONE HYDROCHLORIDE AND ACETAMINOPHEN 1 TABLET: 5; 325 TABLET ORAL at 05:59

## 2017-01-01 RX ADMIN — HYDROMORPHONE HYDROCHLORIDE 0.5 MG: 1 INJECTION, SOLUTION INTRAMUSCULAR; INTRAVENOUS; SUBCUTANEOUS at 01:16

## 2017-01-01 RX ADMIN — HYDROMORPHONE HYDROCHLORIDE 0.5 MG: 1 INJECTION, SOLUTION INTRAMUSCULAR; INTRAVENOUS; SUBCUTANEOUS at 09:45

## 2017-01-01 RX ADMIN — LEVOTHYROXINE SODIUM 75 MCG: 75 TABLET ORAL at 09:35

## 2017-01-01 RX ADMIN — DILTIAZEM HYDROCHLORIDE 120 MG: 120 CAPSULE, COATED, EXTENDED RELEASE ORAL at 09:43

## 2017-01-01 RX ADMIN — DOCUSATE SODIUM 100 MG: 100 CAPSULE, LIQUID FILLED ORAL at 09:40

## 2017-01-01 RX ADMIN — ENOXAPARIN SODIUM 30 MG: 30 INJECTION SUBCUTANEOUS at 08:34

## 2017-01-01 RX ADMIN — Medication 250 MG: at 17:22

## 2017-01-01 RX ADMIN — AMIODARONE HYDROCHLORIDE 200 MG: 200 TABLET ORAL at 09:32

## 2017-01-01 RX ADMIN — POTASSIUM CHLORIDE 40 MEQ: 750 CAPSULE, EXTENDED RELEASE ORAL at 18:23

## 2017-01-01 RX ADMIN — HYDROMORPHONE HYDROCHLORIDE 0.5 MG: 1 INJECTION, SOLUTION INTRAMUSCULAR; INTRAVENOUS; SUBCUTANEOUS at 06:39

## 2017-01-01 RX ADMIN — HYDRALAZINE HYDROCHLORIDE 75 MG: 25 TABLET ORAL at 21:02

## 2017-01-01 RX ADMIN — DILTIAZEM HYDROCHLORIDE 120 MG: 120 CAPSULE, COATED, EXTENDED RELEASE ORAL at 11:20

## 2017-01-01 RX ADMIN — MORPHINE SULFATE 2 MG: 2 INJECTION, SOLUTION INTRAMUSCULAR; INTRAVENOUS at 11:01

## 2017-01-01 RX ADMIN — HYDROMORPHONE HYDROCHLORIDE 0.5 MG: 1 INJECTION, SOLUTION INTRAMUSCULAR; INTRAVENOUS; SUBCUTANEOUS at 18:42

## 2017-01-01 RX ADMIN — HYDRALAZINE HYDROCHLORIDE 75 MG: 25 TABLET ORAL at 08:27

## 2017-01-01 RX ADMIN — AMIODARONE HYDROCHLORIDE 200 MG: 200 TABLET ORAL at 08:27

## 2017-01-01 RX ADMIN — BUMETANIDE 1 MG: 0.25 INJECTION, SOLUTION INTRAMUSCULAR; INTRAVENOUS at 17:57

## 2017-01-01 RX ADMIN — MORPHINE SULFATE 4 MG: 4 INJECTION, SOLUTION INTRAMUSCULAR; INTRAVENOUS at 17:07

## 2017-01-01 RX ADMIN — DILTIAZEM HYDROCHLORIDE 120 MG: 120 CAPSULE, COATED, EXTENDED RELEASE ORAL at 10:07

## 2017-01-01 RX ADMIN — TEMAZEPAM 7.5 MG: 7.5 CAPSULE ORAL at 23:46

## 2017-01-01 RX ADMIN — HYDROMORPHONE HYDROCHLORIDE 0.5 MG: 1 INJECTION, SOLUTION INTRAMUSCULAR; INTRAVENOUS; SUBCUTANEOUS at 13:25

## 2017-01-01 RX ADMIN — HYDRALAZINE HYDROCHLORIDE 75 MG: 25 TABLET ORAL at 09:42

## 2017-01-01 RX ADMIN — HYDROMORPHONE HYDROCHLORIDE 0.5 MG: 1 INJECTION, SOLUTION INTRAMUSCULAR; INTRAVENOUS; SUBCUTANEOUS at 23:24

## 2017-01-01 RX ADMIN — DOCUSATE SODIUM 100 MG: 100 CAPSULE, LIQUID FILLED ORAL at 18:48

## 2017-01-01 RX ADMIN — CEFEPIME HYDROCHLORIDE 1 G: 1 INJECTION, POWDER, FOR SOLUTION INTRAMUSCULAR; INTRAVENOUS at 17:01

## 2017-01-01 RX ADMIN — OXYCODONE HYDROCHLORIDE AND ACETAMINOPHEN 1 TABLET: 5; 325 TABLET ORAL at 10:12

## 2017-01-01 RX ADMIN — DILTIAZEM HYDROCHLORIDE 120 MG: 120 CAPSULE, COATED, EXTENDED RELEASE ORAL at 09:15

## 2017-01-01 RX ADMIN — DOCUSATE SODIUM 100 MG: 100 CAPSULE, LIQUID FILLED ORAL at 17:23

## 2017-01-01 RX ADMIN — OXYCODONE HYDROCHLORIDE AND ACETAMINOPHEN 1 TABLET: 5; 325 TABLET ORAL at 15:45

## 2017-01-01 RX ADMIN — HYDROMORPHONE HYDROCHLORIDE 0.5 MG: 1 INJECTION, SOLUTION INTRAMUSCULAR; INTRAVENOUS; SUBCUTANEOUS at 17:36

## 2017-01-01 RX ADMIN — MORPHINE SULFATE 4 MG: 4 INJECTION, SOLUTION INTRAMUSCULAR; INTRAVENOUS at 08:35

## 2017-01-01 RX ADMIN — PROPOFOL 120 MG: 10 INJECTION, EMULSION INTRAVENOUS at 09:40

## 2017-01-01 RX ADMIN — AMIODARONE HYDROCHLORIDE 200 MG: 200 TABLET ORAL at 18:20

## 2017-01-01 RX ADMIN — OXYCODONE HYDROCHLORIDE AND ACETAMINOPHEN 1 TABLET: 5; 325 TABLET ORAL at 17:22

## 2017-01-01 RX ADMIN — FLUTICASONE PROPIONATE 2 SPRAY: 50 SPRAY, METERED NASAL at 08:17

## 2017-01-01 RX ADMIN — Medication 250 MG: at 17:35

## 2017-01-01 RX ADMIN — DILTIAZEM HYDROCHLORIDE 120 MG: 120 CAPSULE, COATED, EXTENDED RELEASE ORAL at 09:32

## 2017-01-01 RX ADMIN — HYDRALAZINE HYDROCHLORIDE 75 MG: 25 TABLET ORAL at 22:43

## 2017-01-01 RX ADMIN — PHENYLEPHRINE HYDROCHLORIDE 100 MCG: 10 INJECTION INTRAVENOUS at 09:52

## 2017-01-01 RX ADMIN — SODIUM CHLORIDE 125 ML/HR: 9 INJECTION, SOLUTION INTRAVENOUS at 18:37

## 2017-01-01 RX ADMIN — AZTREONAM 1 G: 1 INJECTION, POWDER, FOR SOLUTION INTRAMUSCULAR; INTRAVENOUS at 09:09

## 2017-01-01 RX ADMIN — AMIODARONE HYDROCHLORIDE 200 MG: 200 TABLET ORAL at 17:33

## 2017-01-01 RX ADMIN — PHENYLEPHRINE HYDROCHLORIDE 100 MCG: 10 INJECTION INTRAVENOUS at 09:48

## 2017-01-01 RX ADMIN — PHENAZOPYRIDINE HYDROCHLORIDE 100 MG: 100 TABLET ORAL at 09:43

## 2017-01-01 RX ADMIN — LORAZEPAM 1 MG: 2 INJECTION, SOLUTION INTRAMUSCULAR; INTRAVENOUS at 03:55

## 2017-01-01 RX ADMIN — HYDRALAZINE HYDROCHLORIDE 75 MG: 25 TABLET ORAL at 09:32

## 2017-01-01 RX ADMIN — HYDRALAZINE HYDROCHLORIDE 75 MG: 25 TABLET ORAL at 18:02

## 2017-01-01 RX ADMIN — BUMETANIDE 1 MG: 0.25 INJECTION, SOLUTION INTRAMUSCULAR; INTRAVENOUS at 18:20

## 2017-01-01 RX ADMIN — PHENYLEPHRINE HYDROCHLORIDE 100 MCG: 10 INJECTION INTRAVENOUS at 10:04

## 2017-01-01 RX ADMIN — Medication 250 MG: at 17:30

## 2017-01-01 RX ADMIN — MICONAZOLE NITRATE: 2 POWDER TOPICAL at 10:58

## 2017-01-01 RX ADMIN — DOCUSATE SODIUM 100 MG: 100 CAPSULE, LIQUID FILLED ORAL at 11:19

## 2017-01-01 RX ADMIN — ONDANSETRON 4 MG: 4 TABLET, ORALLY DISINTEGRATING ORAL at 12:35

## 2017-01-01 RX ADMIN — HYDROMORPHONE HYDROCHLORIDE 0.5 MG: 1 INJECTION, SOLUTION INTRAMUSCULAR; INTRAVENOUS; SUBCUTANEOUS at 18:25

## 2017-01-01 RX ADMIN — HYDRALAZINE HYDROCHLORIDE 75 MG: 25 TABLET ORAL at 18:24

## 2017-01-01 RX ADMIN — ONDANSETRON 4 MG: 2 INJECTION INTRAMUSCULAR; INTRAVENOUS at 11:01

## 2017-01-01 RX ADMIN — HYDROMORPHONE HYDROCHLORIDE 0.5 MG: 1 INJECTION, SOLUTION INTRAMUSCULAR; INTRAVENOUS; SUBCUTANEOUS at 08:26

## 2017-01-01 RX ADMIN — ENOXAPARIN SODIUM 30 MG: 30 INJECTION SUBCUTANEOUS at 08:40

## 2017-01-01 RX ADMIN — AMIODARONE HYDROCHLORIDE 200 MG: 200 TABLET ORAL at 17:01

## 2017-01-01 RX ADMIN — FLUTICASONE PROPIONATE 2 SPRAY: 50 SPRAY, METERED NASAL at 08:40

## 2017-01-01 RX ADMIN — BUMETANIDE 1 MG: 1 TABLET ORAL at 09:45

## 2017-01-01 RX ADMIN — HYDROMORPHONE HYDROCHLORIDE 0.5 MG: 1 INJECTION, SOLUTION INTRAMUSCULAR; INTRAVENOUS; SUBCUTANEOUS at 20:43

## 2017-01-01 RX ADMIN — AMIODARONE HYDROCHLORIDE 200 MG: 200 TABLET ORAL at 08:39

## 2017-01-01 RX ADMIN — BUMETANIDE 1 MG: 1 TABLET ORAL at 09:39

## 2017-01-01 RX ADMIN — LORAZEPAM 0.5 MG: 2 INJECTION, SOLUTION INTRAMUSCULAR; INTRAVENOUS at 22:04

## 2017-01-01 RX ADMIN — OXYCODONE HYDROCHLORIDE AND ACETAMINOPHEN 1 TABLET: 5; 325 TABLET ORAL at 06:50

## 2017-01-01 RX ADMIN — Medication 250 MG: at 10:12

## 2017-01-01 RX ADMIN — MICONAZOLE NITRATE: 2 POWDER TOPICAL at 09:40

## 2017-01-01 RX ADMIN — MORPHINE SULFATE 4 MG: 4 INJECTION, SOLUTION INTRAMUSCULAR; INTRAVENOUS at 12:52

## 2017-01-01 RX ADMIN — HYDROMORPHONE HYDROCHLORIDE 0.5 MG: 1 INJECTION, SOLUTION INTRAMUSCULAR; INTRAVENOUS; SUBCUTANEOUS at 10:25

## 2017-01-01 RX ADMIN — LEVOTHYROXINE SODIUM 75 MCG: 75 TABLET ORAL at 05:40

## 2017-01-01 RX ADMIN — AMIODARONE HYDROCHLORIDE 200 MG: 200 TABLET ORAL at 09:42

## 2017-01-01 RX ADMIN — LEVOTHYROXINE SODIUM 75 MCG: 75 TABLET ORAL at 05:23

## 2017-01-01 RX ADMIN — AMIODARONE HYDROCHLORIDE 200 MG: 200 TABLET ORAL at 16:32

## 2017-01-01 RX ADMIN — Medication 250 MG: at 09:45

## 2017-01-01 RX ADMIN — LORAZEPAM 1 MG: 2 INJECTION, SOLUTION INTRAMUSCULAR; INTRAVENOUS at 08:36

## 2017-01-01 RX ADMIN — DOCUSATE SODIUM 100 MG: 100 CAPSULE, LIQUID FILLED ORAL at 21:13

## 2017-01-01 RX ADMIN — DOCUSATE SODIUM 100 MG: 100 CAPSULE, LIQUID FILLED ORAL at 08:27

## 2017-01-01 RX ADMIN — DILTIAZEM HYDROCHLORIDE 120 MG: 120 CAPSULE, COATED, EXTENDED RELEASE ORAL at 09:33

## 2017-01-01 RX ADMIN — LEVOTHYROXINE SODIUM 75 MCG: 75 TABLET ORAL at 06:50

## 2017-01-01 RX ADMIN — LORAZEPAM 0.5 MG: 0.5 TABLET ORAL at 08:16

## 2017-01-01 RX ADMIN — LORAZEPAM 0.5 MG: 0.5 TABLET ORAL at 14:56

## 2017-01-01 RX ADMIN — LEVOTHYROXINE SODIUM 75 MCG: 75 TABLET ORAL at 05:54

## 2017-01-01 RX ADMIN — DILTIAZEM HYDROCHLORIDE 120 MG: 120 CAPSULE, COATED, EXTENDED RELEASE ORAL at 08:16

## 2017-01-01 RX ADMIN — DOCUSATE SODIUM 100 MG: 100 CAPSULE, LIQUID FILLED ORAL at 08:16

## 2017-01-01 RX ADMIN — OXYCODONE HYDROCHLORIDE AND ACETAMINOPHEN 1 TABLET: 5; 325 TABLET ORAL at 00:44

## 2017-01-01 RX ADMIN — HYDRALAZINE HYDROCHLORIDE 75 MG: 25 TABLET ORAL at 22:48

## 2017-01-01 RX ADMIN — MORPHINE SULFATE 10 MG: 100 SOLUTION ORAL at 22:33

## 2017-01-01 RX ADMIN — FLUTICASONE PROPIONATE 2 SPRAY: 50 SPRAY, METERED NASAL at 08:27

## 2017-01-01 RX ADMIN — HYDROMORPHONE HYDROCHLORIDE 0.5 MG: 1 INJECTION, SOLUTION INTRAMUSCULAR; INTRAVENOUS; SUBCUTANEOUS at 08:34

## 2017-01-01 RX ADMIN — HYDROMORPHONE HYDROCHLORIDE 0.5 MG: 1 INJECTION, SOLUTION INTRAMUSCULAR; INTRAVENOUS; SUBCUTANEOUS at 05:42

## 2017-01-01 RX ADMIN — OXYCODONE HYDROCHLORIDE AND ACETAMINOPHEN 1 TABLET: 5; 325 TABLET ORAL at 21:19

## 2017-01-01 RX ADMIN — AMIODARONE HYDROCHLORIDE 200 MG: 200 TABLET ORAL at 17:36

## 2017-01-01 RX ADMIN — OXYCODONE HYDROCHLORIDE AND ACETAMINOPHEN 1 TABLET: 5; 325 TABLET ORAL at 21:32

## 2017-01-01 RX ADMIN — HYDROMORPHONE HYDROCHLORIDE 0.5 MG: 1 INJECTION, SOLUTION INTRAMUSCULAR; INTRAVENOUS; SUBCUTANEOUS at 11:07

## 2017-01-01 RX ADMIN — MORPHINE SULFATE 4 MG: 4 INJECTION, SOLUTION INTRAMUSCULAR; INTRAVENOUS at 00:36

## 2017-01-01 RX ADMIN — Medication 250 MG: at 18:00

## 2017-01-01 RX ADMIN — MORPHINE SULFATE 10 MG: 100 SOLUTION ORAL at 09:43

## 2017-01-01 RX ADMIN — ENOXAPARIN SODIUM 30 MG: 30 INJECTION SUBCUTANEOUS at 10:24

## 2017-01-01 RX ADMIN — ENOXAPARIN SODIUM 30 MG: 30 INJECTION SUBCUTANEOUS at 09:31

## 2017-01-01 RX ADMIN — SODIUM CHLORIDE 500 ML: 9 INJECTION, SOLUTION INTRAVENOUS at 18:53

## 2017-01-01 RX ADMIN — LORAZEPAM 1 MG: 1 TABLET ORAL at 05:39

## 2017-01-01 RX ADMIN — LIDOCAINE HYDROCHLORIDE 60 MG: 20 INJECTION, SOLUTION INFILTRATION; PERINEURAL at 09:40

## 2017-01-01 RX ADMIN — DOCUSATE SODIUM 100 MG: 100 CAPSULE, LIQUID FILLED ORAL at 17:59

## 2017-01-01 RX ADMIN — Medication 250 MG: at 17:01

## 2017-01-01 RX ADMIN — HYDRALAZINE HYDROCHLORIDE 75 MG: 25 TABLET ORAL at 08:34

## 2017-01-01 RX ADMIN — DILTIAZEM HYDROCHLORIDE 120 MG: 120 CAPSULE, COATED, EXTENDED RELEASE ORAL at 08:34

## 2017-01-01 RX ADMIN — MORPHINE SULFATE 5 MG: 100 SOLUTION ORAL at 21:09

## 2017-01-01 RX ADMIN — ONDANSETRON 4 MG: 2 INJECTION INTRAMUSCULAR; INTRAVENOUS at 21:02

## 2017-01-01 RX ADMIN — DOCUSATE SODIUM 100 MG: 100 CAPSULE, LIQUID FILLED ORAL at 08:13

## 2017-01-01 RX ADMIN — AMIODARONE HYDROCHLORIDE 200 MG: 200 TABLET ORAL at 09:09

## 2017-01-01 RX ADMIN — DOCUSATE SODIUM 100 MG: 100 CAPSULE, LIQUID FILLED ORAL at 08:48

## 2017-01-01 RX ADMIN — BUMETANIDE 1 MG: 0.25 INJECTION, SOLUTION INTRAMUSCULAR; INTRAVENOUS at 13:05

## 2017-01-01 RX ADMIN — AMIODARONE HYDROCHLORIDE 200 MG: 200 TABLET ORAL at 09:33

## 2017-01-01 RX ADMIN — HYDRALAZINE HYDROCHLORIDE 75 MG: 25 TABLET ORAL at 09:15

## 2017-01-01 RX ADMIN — HYDRALAZINE HYDROCHLORIDE 75 MG: 25 TABLET ORAL at 21:19

## 2017-01-01 RX ADMIN — MORPHINE SULFATE 4 MG: 4 INJECTION, SOLUTION INTRAMUSCULAR; INTRAVENOUS at 14:29

## 2017-01-01 RX ADMIN — CEFEPIME HYDROCHLORIDE 1 G: 1 INJECTION, POWDER, FOR SOLUTION INTRAMUSCULAR; INTRAVENOUS at 17:15

## 2017-01-01 RX ADMIN — HYDRALAZINE HYDROCHLORIDE 75 MG: 25 TABLET ORAL at 21:32

## 2017-01-01 RX ADMIN — AMIODARONE HYDROCHLORIDE 200 MG: 200 TABLET ORAL at 17:15

## 2017-01-01 RX ADMIN — LEVOTHYROXINE SODIUM 75 MCG: 75 TABLET ORAL at 05:30

## 2017-01-01 RX ADMIN — LORAZEPAM 0.5 MG: 2 INJECTION, SOLUTION INTRAMUSCULAR; INTRAVENOUS at 13:58

## 2017-01-01 RX ADMIN — ENOXAPARIN SODIUM 30 MG: 30 INJECTION SUBCUTANEOUS at 08:26

## 2017-01-01 RX ADMIN — FLUTICASONE PROPIONATE 2 SPRAY: 50 SPRAY, METERED NASAL at 09:34

## 2017-01-01 RX ADMIN — HYDROMORPHONE HYDROCHLORIDE 0.5 MG: 1 INJECTION, SOLUTION INTRAMUSCULAR; INTRAVENOUS; SUBCUTANEOUS at 03:58

## 2017-01-01 RX ADMIN — HYDROMORPHONE HYDROCHLORIDE 0.5 MG: 1 INJECTION, SOLUTION INTRAMUSCULAR; INTRAVENOUS; SUBCUTANEOUS at 21:30

## 2017-01-01 RX ADMIN — MORPHINE SULFATE 4 MG: 4 INJECTION, SOLUTION INTRAMUSCULAR; INTRAVENOUS at 13:33

## 2017-01-01 RX ADMIN — OXYCODONE HYDROCHLORIDE AND ACETAMINOPHEN 1 TABLET: 5; 325 TABLET ORAL at 21:31

## 2017-01-01 RX ADMIN — AMIODARONE HYDROCHLORIDE 200 MG: 200 TABLET ORAL at 08:13

## 2017-01-01 RX ADMIN — OXYCODONE HYDROCHLORIDE AND ACETAMINOPHEN 1 TABLET: 5; 325 TABLET ORAL at 13:03

## 2017-01-01 RX ADMIN — PHENYLEPHRINE HYDROCHLORIDE 100 MCG: 10 INJECTION INTRAVENOUS at 09:58

## 2017-01-01 RX ADMIN — AMIODARONE HYDROCHLORIDE 200 MG: 200 TABLET ORAL at 17:22

## 2017-01-01 RX ADMIN — PHENAZOPYRIDINE HYDROCHLORIDE 100 MG: 100 TABLET ORAL at 09:30

## 2017-01-01 RX ADMIN — Medication 250 MG: at 17:33

## 2017-01-01 RX ADMIN — Medication 250 MG: at 09:43

## 2017-01-01 RX ADMIN — DILTIAZEM HYDROCHLORIDE 120 MG: 120 CAPSULE, COATED, EXTENDED RELEASE ORAL at 09:39

## 2017-01-01 RX ADMIN — ENOXAPARIN SODIUM 30 MG: 30 INJECTION SUBCUTANEOUS at 09:11

## 2017-01-01 RX ADMIN — HYDRALAZINE HYDROCHLORIDE 75 MG: 25 TABLET ORAL at 17:01

## 2017-01-01 RX ADMIN — BUMETANIDE 0.5 MG: 0.25 INJECTION, SOLUTION INTRAMUSCULAR; INTRAVENOUS at 14:33

## 2017-01-01 RX ADMIN — Medication 250 MG: at 09:33

## 2017-01-01 RX ADMIN — DOCUSATE SODIUM 100 MG: 100 CAPSULE, LIQUID FILLED ORAL at 10:07

## 2017-01-01 RX ADMIN — LORAZEPAM 1 MG: 2 INJECTION, SOLUTION INTRAMUSCULAR; INTRAVENOUS at 04:32

## 2017-01-01 RX ADMIN — BUMETANIDE 1 MG: 1 TABLET ORAL at 11:20

## 2017-01-01 RX ADMIN — HYDROMORPHONE HYDROCHLORIDE 0.5 MG: 1 INJECTION, SOLUTION INTRAMUSCULAR; INTRAVENOUS; SUBCUTANEOUS at 02:47

## 2017-01-01 RX ADMIN — HYDRALAZINE HYDROCHLORIDE 75 MG: 25 TABLET ORAL at 08:13

## 2017-01-01 RX ADMIN — HYDRALAZINE HYDROCHLORIDE 75 MG: 25 TABLET ORAL at 21:25

## 2017-01-01 RX ADMIN — HYDROMORPHONE HYDROCHLORIDE 0.5 MG: 1 INJECTION, SOLUTION INTRAMUSCULAR; INTRAVENOUS; SUBCUTANEOUS at 11:05

## 2017-01-01 RX ADMIN — MORPHINE SULFATE 4 MG: 4 INJECTION, SOLUTION INTRAMUSCULAR; INTRAVENOUS at 12:58

## 2017-01-01 RX ADMIN — OXYCODONE HYDROCHLORIDE AND ACETAMINOPHEN 1 TABLET: 5; 325 TABLET ORAL at 21:02

## 2017-01-01 RX ADMIN — Medication 250 MG: at 08:34

## 2017-01-01 RX ADMIN — OXYCODONE HYDROCHLORIDE AND ACETAMINOPHEN 1 TABLET: 5; 325 TABLET ORAL at 05:09

## 2017-01-01 RX ADMIN — MORPHINE SULFATE 4 MG: 4 INJECTION, SOLUTION INTRAMUSCULAR; INTRAVENOUS at 04:44

## 2017-01-01 RX ADMIN — BUMETANIDE 1 MG: 1 TABLET ORAL at 09:42

## 2017-01-01 RX ADMIN — OXYCODONE HYDROCHLORIDE AND ACETAMINOPHEN 1 TABLET: 5; 325 TABLET ORAL at 09:15

## 2017-01-01 RX ADMIN — BUMETANIDE 1 MG: 0.25 INJECTION, SOLUTION INTRAMUSCULAR; INTRAVENOUS at 09:00

## 2017-01-01 RX ADMIN — ENOXAPARIN SODIUM 30 MG: 30 INJECTION SUBCUTANEOUS at 08:27

## 2017-01-01 RX ADMIN — SODIUM CHLORIDE 125 ML/HR: 9 INJECTION, SOLUTION INTRAVENOUS at 20:44

## 2017-01-01 RX ADMIN — ENOXAPARIN SODIUM 30 MG: 30 INJECTION SUBCUTANEOUS at 09:15

## 2017-01-01 RX ADMIN — LORAZEPAM 0.5 MG: 2 INJECTION, SOLUTION INTRAMUSCULAR; INTRAVENOUS at 09:12

## 2017-01-01 RX ADMIN — OXYCODONE HYDROCHLORIDE AND ACETAMINOPHEN 1 TABLET: 5; 325 TABLET ORAL at 17:00

## 2017-01-01 RX ADMIN — OXYCODONE HYDROCHLORIDE AND ACETAMINOPHEN 1 TABLET: 5; 325 TABLET ORAL at 09:34

## 2017-01-01 RX ADMIN — DILTIAZEM HYDROCHLORIDE 120 MG: 120 CAPSULE, COATED, EXTENDED RELEASE ORAL at 10:24

## 2017-01-01 RX ADMIN — VANCOMYCIN HYDROCHLORIDE 1000 MG: 1 INJECTION, SOLUTION INTRAVENOUS at 20:20

## 2017-01-01 RX ADMIN — OXYCODONE HYDROCHLORIDE AND ACETAMINOPHEN 1 TABLET: 5; 325 TABLET ORAL at 06:39

## 2017-01-01 RX ADMIN — LEVOTHYROXINE SODIUM 75 MCG: 75 TABLET ORAL at 06:04

## 2017-01-01 RX ADMIN — HYDRALAZINE HYDROCHLORIDE 75 MG: 25 TABLET ORAL at 09:09

## 2017-01-01 RX ADMIN — FLUTICASONE PROPIONATE 2 SPRAY: 50 SPRAY, METERED NASAL at 16:32

## 2017-01-01 RX ADMIN — AMIODARONE HYDROCHLORIDE 200 MG: 200 TABLET ORAL at 09:15

## 2017-01-01 RX ADMIN — DOCUSATE SODIUM 100 MG: 100 CAPSULE, LIQUID FILLED ORAL at 09:45

## 2017-01-01 RX ADMIN — SODIUM POLYSTYRENE SULFONATE 15 G: 15 SUSPENSION ORAL; RECTAL at 20:20

## 2017-01-01 RX ADMIN — FLUTICASONE PROPIONATE 2 SPRAY: 50 SPRAY, METERED NASAL at 09:40

## 2017-01-01 RX ADMIN — OXYCODONE HYDROCHLORIDE AND ACETAMINOPHEN 1 TABLET: 5; 325 TABLET ORAL at 04:06

## 2017-01-01 RX ADMIN — MICONAZOLE NITRATE: 2 POWDER TOPICAL at 22:14

## 2017-01-01 RX ADMIN — HYDRALAZINE HYDROCHLORIDE 75 MG: 25 TABLET ORAL at 17:35

## 2017-01-01 RX ADMIN — LORAZEPAM 1 MG: 2 INJECTION, SOLUTION INTRAMUSCULAR; INTRAVENOUS at 12:52

## 2017-01-01 RX ADMIN — MORPHINE SULFATE 2 MG: 2 INJECTION, SOLUTION INTRAMUSCULAR; INTRAVENOUS at 08:56

## 2017-01-01 RX ADMIN — MORPHINE SULFATE 4 MG: 4 INJECTION, SOLUTION INTRAMUSCULAR; INTRAVENOUS at 09:40

## 2017-01-01 RX ADMIN — LEVOTHYROXINE SODIUM 75 MCG: 75 TABLET ORAL at 06:02

## 2017-01-01 RX ADMIN — ZOLEDRONIC ACID 3 MG: 4 INJECTION, SOLUTION, CONCENTRATE INTRAVENOUS at 18:43

## 2017-01-01 RX ADMIN — HYDROMORPHONE HYDROCHLORIDE 0.5 MG: 1 INJECTION, SOLUTION INTRAMUSCULAR; INTRAVENOUS; SUBCUTANEOUS at 04:35

## 2017-01-01 RX ADMIN — Medication 250 MG: at 17:12

## 2017-01-01 RX ADMIN — LORAZEPAM 1 MG: 1 TABLET ORAL at 11:19

## 2017-01-01 RX ADMIN — AMIODARONE HYDROCHLORIDE 200 MG: 200 TABLET ORAL at 17:12

## 2017-01-01 RX ADMIN — OXYCODONE HYDROCHLORIDE AND ACETAMINOPHEN 1 TABLET: 5; 325 TABLET ORAL at 05:14

## 2017-01-01 RX ADMIN — BUMETANIDE 1 MG: 1 TABLET ORAL at 10:07

## 2017-01-01 RX ADMIN — HYDRALAZINE HYDROCHLORIDE 75 MG: 25 TABLET ORAL at 10:12

## 2017-01-01 RX ADMIN — AMIODARONE HYDROCHLORIDE 200 MG: 200 TABLET ORAL at 09:43

## 2017-01-01 RX ADMIN — SODIUM CHLORIDE 75 ML/HR: 9 INJECTION, SOLUTION INTRAVENOUS at 02:31

## 2017-01-01 RX ADMIN — FLUTICASONE PROPIONATE 2 SPRAY: 50 SPRAY, METERED NASAL at 10:25

## 2017-01-01 RX ADMIN — DOCUSATE SODIUM 100 MG: 100 CAPSULE, LIQUID FILLED ORAL at 18:16

## 2017-01-01 RX ADMIN — MICONAZOLE NITRATE: 2 POWDER TOPICAL at 21:23

## 2017-01-01 RX ADMIN — FLUTICASONE PROPIONATE 2 SPRAY: 50 SPRAY, METERED NASAL at 11:20

## 2017-01-01 RX ADMIN — AMIODARONE HYDROCHLORIDE 200 MG: 200 TABLET ORAL at 18:00

## 2017-01-01 RX ADMIN — SODIUM CHLORIDE 125 ML/HR: 9 INJECTION, SOLUTION INTRAVENOUS at 13:25

## 2017-01-01 RX ADMIN — DILTIAZEM HYDROCHLORIDE 120 MG: 120 CAPSULE, COATED, EXTENDED RELEASE ORAL at 09:09

## 2017-01-01 RX ADMIN — GLYCOPYRROLATE 0.2 MG: 0.2 INJECTION INTRAMUSCULAR; INTRAVENOUS at 09:13

## 2017-01-01 RX ADMIN — SODIUM CHLORIDE 50 ML/HR: 9 INJECTION, SOLUTION INTRAVENOUS at 12:15

## 2017-01-01 RX ADMIN — Medication 250 MG: at 18:24

## 2017-01-01 RX ADMIN — VANCOMYCIN HYDROCHLORIDE 1000 MG: 1 INJECTION, SOLUTION INTRAVENOUS at 12:13

## 2017-01-01 RX ADMIN — ENOXAPARIN SODIUM 30 MG: 30 INJECTION SUBCUTANEOUS at 09:44

## 2017-01-01 RX ADMIN — LORAZEPAM 1 MG: 1 TABLET ORAL at 22:31

## 2017-01-01 RX ADMIN — HYDRALAZINE HYDROCHLORIDE 75 MG: 25 TABLET ORAL at 21:50

## 2017-01-01 RX ADMIN — LORAZEPAM 0.5 MG: 2 INJECTION, SOLUTION INTRAMUSCULAR; INTRAVENOUS at 22:15

## 2017-01-01 RX ADMIN — LORAZEPAM 0.5 MG: 0.5 TABLET ORAL at 05:14

## 2017-01-01 RX ADMIN — BUMETANIDE 1 MG: 1 TABLET ORAL at 16:12

## 2017-01-01 RX ADMIN — BUMETANIDE 1 MG: 0.25 INJECTION, SOLUTION INTRAMUSCULAR; INTRAVENOUS at 17:11

## 2017-01-01 RX ADMIN — MORPHINE SULFATE 2 MG: 2 INJECTION, SOLUTION INTRAMUSCULAR; INTRAVENOUS at 11:53

## 2017-01-01 RX ADMIN — OXYCODONE HYDROCHLORIDE AND ACETAMINOPHEN 1 TABLET: 5; 325 TABLET ORAL at 16:48

## 2017-01-01 RX ADMIN — LORAZEPAM 1 MG: 2 SOLUTION, CONCENTRATE ORAL at 22:33

## 2017-01-01 RX ADMIN — LORAZEPAM 0.5 MG: 0.5 TABLET ORAL at 11:19

## 2017-01-01 RX ADMIN — MORPHINE SULFATE 4 MG: 4 INJECTION, SOLUTION INTRAMUSCULAR; INTRAVENOUS at 13:58

## 2017-01-01 RX ADMIN — MORPHINE SULFATE 4 MG: 4 INJECTION, SOLUTION INTRAMUSCULAR; INTRAVENOUS at 14:09

## 2017-01-01 RX ADMIN — HYDROMORPHONE HYDROCHLORIDE 0.5 MG: 1 INJECTION, SOLUTION INTRAMUSCULAR; INTRAVENOUS; SUBCUTANEOUS at 21:02

## 2017-01-01 RX ADMIN — SODIUM CHLORIDE 125 ML/HR: 9 INJECTION, SOLUTION INTRAVENOUS at 19:07

## 2017-01-01 RX ADMIN — HYDRALAZINE HYDROCHLORIDE 75 MG: 25 TABLET ORAL at 16:48

## 2017-01-01 RX ADMIN — HYDROMORPHONE HYDROCHLORIDE 0.5 MG: 1 INJECTION, SOLUTION INTRAMUSCULAR; INTRAVENOUS; SUBCUTANEOUS at 23:00

## 2017-01-01 RX ADMIN — HYDRALAZINE HYDROCHLORIDE 75 MG: 25 TABLET ORAL at 20:29

## 2017-01-01 RX ADMIN — HYDRALAZINE HYDROCHLORIDE 75 MG: 25 TABLET ORAL at 20:53

## 2017-01-01 RX ADMIN — BUMETANIDE 1 MG: 0.25 INJECTION, SOLUTION INTRAMUSCULAR; INTRAVENOUS at 09:15

## 2017-01-01 RX ADMIN — HYDRALAZINE HYDROCHLORIDE 75 MG: 25 TABLET ORAL at 16:12

## 2017-01-01 RX ADMIN — CEFEPIME HYDROCHLORIDE 1 G: 1 INJECTION, POWDER, FOR SOLUTION INTRAMUSCULAR; INTRAVENOUS at 16:36

## 2017-01-01 RX ADMIN — HYDROMORPHONE HYDROCHLORIDE 0.5 MG: 1 INJECTION, SOLUTION INTRAMUSCULAR; INTRAVENOUS; SUBCUTANEOUS at 22:28

## 2017-01-01 RX ADMIN — OXYCODONE HYDROCHLORIDE AND ACETAMINOPHEN 1 TABLET: 5; 325 TABLET ORAL at 12:35

## 2017-01-01 RX ADMIN — OXYCODONE HYDROCHLORIDE AND ACETAMINOPHEN 1 TABLET: 5; 325 TABLET ORAL at 11:20

## 2017-01-01 RX ADMIN — MORPHINE SULFATE 4 MG: 4 INJECTION, SOLUTION INTRAMUSCULAR; INTRAVENOUS at 22:15

## 2017-01-01 RX ADMIN — OXYCODONE HYDROCHLORIDE AND ACETAMINOPHEN 1 TABLET: 5; 325 TABLET ORAL at 23:48

## 2017-01-01 RX ADMIN — OXYCODONE HYDROCHLORIDE AND ACETAMINOPHEN 1 TABLET: 5; 325 TABLET ORAL at 05:39

## 2017-01-01 RX ADMIN — FLUTICASONE PROPIONATE 2 SPRAY: 50 SPRAY, METERED NASAL at 09:35

## 2017-01-01 RX ADMIN — HYDRALAZINE HYDROCHLORIDE 75 MG: 25 TABLET ORAL at 09:33

## 2017-01-01 RX ADMIN — FLUTICASONE PROPIONATE 2 SPRAY: 50 SPRAY, METERED NASAL at 09:15

## 2017-01-01 RX ADMIN — Medication 250 MG: at 09:42

## 2017-01-01 RX ADMIN — AMIODARONE HYDROCHLORIDE 200 MG: 200 TABLET ORAL at 18:24

## 2017-01-01 RX ADMIN — Medication 250 MG: at 18:20

## 2017-01-01 RX ADMIN — ENOXAPARIN SODIUM 30 MG: 30 INJECTION SUBCUTANEOUS at 08:13

## 2017-01-01 RX ADMIN — HYDROMORPHONE HYDROCHLORIDE 0.5 MG: 1 INJECTION, SOLUTION INTRAMUSCULAR; INTRAVENOUS; SUBCUTANEOUS at 05:36

## 2017-01-01 RX ADMIN — AMIODARONE HYDROCHLORIDE 200 MG: 200 TABLET ORAL at 18:02

## 2017-01-01 RX ADMIN — PROPOFOL 180 MCG/KG/MIN: 10 INJECTION, EMULSION INTRAVENOUS at 09:40

## 2017-01-01 RX ADMIN — LEVOTHYROXINE SODIUM 75 MCG: 75 TABLET ORAL at 05:11

## 2017-01-01 RX ADMIN — SODIUM CHLORIDE 125 ML/HR: 9 INJECTION, SOLUTION INTRAVENOUS at 18:53

## 2017-01-01 RX ADMIN — DOCUSATE SODIUM 100 MG: 100 CAPSULE, LIQUID FILLED ORAL at 19:58

## 2017-01-01 RX ADMIN — HYDROMORPHONE HYDROCHLORIDE 0.5 MG: 1 INJECTION, SOLUTION INTRAMUSCULAR; INTRAVENOUS; SUBCUTANEOUS at 03:24

## 2017-01-01 RX ADMIN — MICONAZOLE NITRATE: 2 POWDER TOPICAL at 08:48

## 2017-01-01 RX ADMIN — ENOXAPARIN SODIUM 30 MG: 30 INJECTION SUBCUTANEOUS at 09:34

## 2017-01-01 RX ADMIN — HYDRALAZINE HYDROCHLORIDE 75 MG: 25 TABLET ORAL at 16:32

## 2017-01-01 RX ADMIN — HYDRALAZINE HYDROCHLORIDE 75 MG: 25 TABLET ORAL at 20:34

## 2017-01-01 RX ADMIN — DIATRIZOATE MEGLUMINE AND DIATRIZOATE SODIUM 30 ML: 600; 100 SOLUTION ORAL; RECTAL at 13:05

## 2017-01-01 RX ADMIN — AZTREONAM 1 G: 1 INJECTION, POWDER, FOR SOLUTION INTRAMUSCULAR; INTRAVENOUS at 00:54

## 2017-01-01 RX ADMIN — BUMETANIDE 1 MG: 1 TABLET ORAL at 08:16

## 2017-01-01 RX ADMIN — BUMETANIDE 1 MG: 0.25 INJECTION, SOLUTION INTRAMUSCULAR; INTRAVENOUS at 08:34

## 2017-01-01 RX ADMIN — DOCUSATE SODIUM 100 MG: 100 CAPSULE, LIQUID FILLED ORAL at 20:36

## 2017-01-01 RX ADMIN — LORAZEPAM 1 MG: 2 INJECTION, SOLUTION INTRAMUSCULAR; INTRAVENOUS at 17:07

## 2017-01-01 RX ADMIN — BUMETANIDE 1 MG: 0.25 INJECTION, SOLUTION INTRAMUSCULAR; INTRAVENOUS at 17:19

## 2017-01-01 RX ADMIN — HYDRALAZINE HYDROCHLORIDE 75 MG: 25 TABLET ORAL at 17:22

## 2017-01-01 RX ADMIN — BUMETANIDE 1 MG: 0.25 INJECTION, SOLUTION INTRAMUSCULAR; INTRAVENOUS at 10:12

## 2017-01-01 RX ADMIN — FLUTICASONE PROPIONATE 2 SPRAY: 50 SPRAY, METERED NASAL at 09:45

## 2017-01-01 RX ADMIN — MORPHINE SULFATE 4 MG: 4 INJECTION, SOLUTION INTRAMUSCULAR; INTRAVENOUS at 22:04

## 2017-01-01 RX ADMIN — HYDRALAZINE HYDROCHLORIDE 75 MG: 25 TABLET ORAL at 08:40

## 2017-01-01 RX ADMIN — Medication 250 MG: at 18:02

## 2017-01-01 RX ADMIN — MORPHINE SULFATE 5 MG: 100 SOLUTION ORAL at 05:30

## 2017-01-01 RX ADMIN — PHENAZOPYRIDINE HYDROCHLORIDE 100 MG: 100 TABLET ORAL at 23:30

## 2017-01-06 PROBLEM — B37.0 THRUSH: Status: ACTIVE | Noted: 2017-01-01
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
## 2017-01-07 PROBLEM — J18.9 HAP (HOSPITAL-ACQUIRED PNEUMONIA): Status: ACTIVE | Noted: 2017-01-01

## 2017-01-09 PROBLEM — J10.1 INFLUENZA A: Status: ACTIVE | Noted: 2017-01-01

## 2017-01-12 NOTE — ANESTHESIA PREPROCEDURE EVALUATION
Anesthesia Evaluation     Patient summary reviewed and Nursing notes reviewed    Airway   Mallampati: II  TM distance: >3 FB  Neck ROM: full  Dental    (+) upper dentures    Pulmonary    (+) pneumonia , COPD, rhonchi,   Cardiovascular - normal exam  (+) hypertension, dysrhythmias (LBBB) Atrial Fib,     Neuro/Psych  (+) weakness,    GI/Hepatic/Renal/Endo    (+)  liver disease, chronic renal disease CRI and ARF,     Musculoskeletal     Abdominal  - normal exam   Substance History - negative use     OB/GYN          Other   (+) arthritis                      Anesthesia Plan    ASA 4     general     intravenous induction   Anesthetic plan and risks discussed with patient.

## 2017-01-12 NOTE — ANESTHESIA PROCEDURE NOTES
Airway  Urgency: elective    Airway not difficult    General Information and Staff    Patient location during procedure: OR  Anesthesiologist: PEGGY AVALOS    Indications and Patient Condition  Indications for airway management: airway protection    Preoxygenated: yes      Final Airway Details  Final airway type: supraglottic airway      Successful airway: classic  Size 4    Number of attempts at approach: 1

## 2017-01-12 NOTE — ANESTHESIA POSTPROCEDURE EVALUATION
Patient: Clementina Betancur    Procedure Summary     Date Anesthesia Start Anesthesia Stop Room / Location    01/12/17 0933 1023  [facility] /  [facility]       Procedure Diagnosis Surgeon Provider    BRONCHOSCOPY  WITH ANESTHESIA   with Bilateral  Lung  Washings (N/A Bronchus) Influenza A; HAP (hospital-acquired pneumonia)  (Influenza A [J10.1]; HAP (hospital-acquired pneumonia) [J18.9]) MD Nic Serrano MD          Anesthesia Type: general  Last vitals  /69 (01/12/17 1023)    Temp      Pulse 80 (01/12/17 1023)   Resp 24 (01/12/17 1023)    SpO2 97 % (01/12/17 1023)      Post Anesthesia Care and Evaluation    Patient location during evaluation: PHASE II  Patient participation: complete - patient participated  Level of consciousness: awake  Pain management: adequate  Airway patency: patent  Anesthetic complications: No anesthetic complications  Cardiovascular status: acceptable  Respiratory status: acceptable  Hydration status: acceptable

## 2017-02-17 NOTE — ED PROVIDER NOTES
EMERGENCY DEPARTMENT ENCOUNTER    CHIEF COMPLAINT  Chief Complaint: abnormal labs  History given by: patient  History limited by: nothing  Room Number: 17/17  PMD: Catrina Santiago MD      HPI:  Pt is a 89 y.o. female who presents complaining of abnormal labs. Pt states that she was sent here from a rehabilitation center for elevated BUN and Creatinine. Pt denies cough, SOB, CP, and any other complaints at this time. Pt states that she has been feeling well recently. Pt has history of stage four renal insufficiency. Pt has f/u with Dr. Filipe Alex (Nephrology) on March 17.    Duration: PTA  Onset: gradual  Timing: constant  Location: N/A  Radiation: N/A  Quality: elevated  Intensity/Severity: mild  Progression: unchanged  Associated Symptoms: none stated  Aggravating Factors: none stated  Alleviating Factors: none stated   Previous Episodes: none stated  Treatment before arrival: none stated    PAST MEDICAL HISTORY  Active Ambulatory Problems     Diagnosis Date Noted   • Atrial fibrillation 12/26/2016   • Fall 12/26/2016   • Chronic kidney disease, stage IV (severe) 12/26/2016   • Left bundle branch block (LBBB) 12/27/2016   • Acute respiratory failure with hypoxia 12/27/2016   • Hypertension 12/27/2016   • COPD (chronic obstructive pulmonary disease) 12/27/2016   • Acute kidney injury 12/27/2016   • Weakness 12/27/2016   • UTI (urinary tract infection) 12/28/2016   • Abdominal wall hernia 12/29/2016   • Clostridium difficile colitis 12/30/2016   • Thrush 01/06/2017   • HAP (hospital-acquired pneumonia) 01/07/2017   • Influenza A 01/09/2017     Resolved Ambulatory Problems     Diagnosis Date Noted   • Acute on chronic congestive heart failure 12/26/2016     Past Medical History   Diagnosis Date   • Arthritis    • Asthma    • Cancer    • CHF (congestive heart failure)    • History of transfusion    • Renal disorder        PAST SURGICAL HISTORY  Past Surgical History   Procedure Laterality Date   • Hernia repair     •  Colectomy partial / total Right 1989   • Hysterectomy     • Abdominal surgery     • Appendectomy     • Cholecystectomy Right    • Winter's neuroma excision Right    • Skin cancer excision     • Bronchoscopy N/A 1/12/2017     Procedure: BRONCHOSCOPY  WITH ANESTHESIA   with Bilateral  Lung  Washings;  Surgeon: Lester Andino MD;  Location: [facility] ENDOSCOPY;  Service:        FAMILY HISTORY  History reviewed. No pertinent family history.    SOCIAL HISTORY  Social History     Social History   • Marital status:      Spouse name: N/A   • Number of children: N/A   • Years of education: N/A     Occupational History   • Not on file.     Social History Main Topics   • Smoking status: Never Smoker   • Smokeless tobacco: Not on file   • Alcohol use Yes      Comment: social   • Drug use: No   • Sexual activity: Not on file     Other Topics Concern   • Not on file     Social History Narrative       ALLERGIES  Ciprofloxacin and Garlic    REVIEW OF SYSTEMS  Review of Systems   Constitutional: Negative for chills, fever and unexpected weight change.        Abnormal labs   HENT: Negative for congestion, rhinorrhea and sore throat.    Respiratory: Negative for cough and shortness of breath.    Cardiovascular: Negative for chest pain and leg swelling.   Gastrointestinal: Negative for abdominal pain, blood in stool, diarrhea, nausea and vomiting.   Genitourinary: Negative for difficulty urinating, dysuria and frequency.   Musculoskeletal: Negative.    Skin: Negative.  Negative for rash.   Neurological: Negative.  Negative for weakness and headaches.   All other systems reviewed and are negative.      PHYSICAL EXAM  ED Triage Vitals   Temp Heart Rate Resp BP SpO2   02/16/17 2156 02/16/17 2154 02/16/17 2154 02/16/17 2154 02/16/17 2154   98 °F (36.7 °C) 60 18 139/58 99 %      Temp src Heart Rate Source Patient Position BP Location FiO2 (%)   -- -- -- -- --              Physical Exam   Constitutional: She is oriented to person, place,  and time and well-developed, well-nourished, and in no distress. No distress.   HENT:   Head: Normocephalic.   Eyes: EOM are normal. Pupils are equal, round, and reactive to light.   Neck: Normal range of motion.   Cardiovascular: Normal rate and regular rhythm.    No murmur heard.  Pulmonary/Chest: Effort normal and breath sounds normal. No respiratory distress.   Abdominal: Soft. There is no tenderness. There is no rebound and no guarding.   Musculoskeletal: Normal range of motion. She exhibits no edema.   Neurological: She is alert and oriented to person, place, and time.   Skin: Skin is warm and dry. No rash noted.   Psychiatric: Mood and affect normal.   Nursing note and vitals reviewed.      LAB RESULTS  Lab Results (last 24 hours)     Procedure Component Value Units Date/Time    CBC & Differential [[id number]] Collected:  02/16/17 2259    Specimen:  Blood Updated:  02/16/17 5229    Narrative:       The following orders were created for panel order CBC & Differential.  Procedure                               Abnormality         Status                     ---------                               -----------         ------                     CBC Auto Differential[[id number]]         Abnormal            Final result                 Please view results for these tests on the individual orders.    Comprehensive Metabolic Panel [[id number]]  (Abnormal) Collected:  02/16/17 2259    Specimen:  Blood Updated:  02/16/17 5375     Glucose 116 (H) mg/dL      BUN 75 (H) mg/dL      Creatinine 3.02 (H) mg/dL      Sodium 137 mmol/L      Potassium 4.2 mmol/L      Chloride 93 (L) mmol/L      CO2 28.2 mmol/L      Calcium 10.9 (H) mg/dL      Total Protein 6.6 g/dL      Albumin 3.80 g/dL      ALT (SGPT) 79 (H) U/L      AST (SGOT) 70 (H) U/L      Alkaline Phosphatase 165 (H) U/L      Total Bilirubin 0.4 mg/dL      eGFR Non African Amer 15 (L) mL/min/1.73      Globulin 2.8 gm/dL      A/G Ratio 1.4 g/dL      BUN/Creatinine Ratio 24.8       Anion Gap 15.8 mmol/L     Narrative:       The MDRD GFR formula is only valid for adults with stable renal function between ages 18 and 70.    CBC Auto Differential [[id number]]  (Abnormal) Collected:  02/16/17 2259    Specimen:  Blood Updated:  02/16/17 2319     WBC 10.90 (H) 10*3/mm3      RBC 3.45 (L) 10*6/mm3      Hemoglobin 10.5 (L) g/dL      Hematocrit 32.2 (L) %      MCV 93.3 fL      MCH 30.4 pg      MCHC 32.6 g/dL      RDW 15.1 (H) %      RDW-SD 51.2 fl      MPV 11.0 fL      Platelets 372 10*3/mm3      Neutrophil % 58.3 %      Lymphocyte % 20.6 %      Monocyte % 15.9 (H) %      Eosinophil % 4.3 %      Basophil % 0.7 %      Immature Grans % 0.2 %      Neutrophils, Absolute 6.35 10*3/mm3      Lymphocytes, Absolute 2.25 10*3/mm3      Monocytes, Absolute 1.73 (H) 10*3/mm3      Eosinophils, Absolute 0.47 10*3/mm3      Basophils, Absolute 0.08 10*3/mm3      Immature Grans, Absolute 0.02 10*3/mm3      nRBC 0.0 /100 WBC           I ordered the above labs and reviewed the results       PROCEDURES  Procedures      PROGRESS AND CONSULTS  ED Course   2156- Ordered blood work, UA, and CMP for further evaluation.     0200- Discussed with pt's daughter on phone and discussed that pt's labs are in line with her previous labs. Discussed with pt and daughter plan to send pt back to the rehabilitation facility. Pt and daughter understands and agrees to the plan. All questions addressed.       MEDICAL DECISION MAKING  Results were reviewed/discussed with the patient and they were also made aware of online access. Pt also made aware that some labs, such as cultures, will not be resulted during ER visit and follow up with PMD is necessary.     MDM  Number of Diagnoses or Management Options     Amount and/or Complexity of Data Reviewed  Clinical lab tests: ordered and reviewed  Decide to obtain previous medical records or to obtain history from someone other than the patient: yes  Review and summarize past medical records: yes  (Indicate that elevated BUN and Creatinine are in line with her prior labs.)  Independent visualization of images, tracings, or specimens: yes    Patient Progress  Patient progress: stable         DIAGNOSIS  Final diagnoses:   Chronic renal failure, stage 4 (severe)       DISPOSITION  DISCHARGE    Patient discharged in stable condition.    Reviewed implications of results, diagnosis, meds, responsibility to follow up, warning signs and symptoms of possible worsening, potential complications and reasons to return to ER.    Patient/Family voiced understanding of above instructions.    Discussed plan for discharge, as there is no emergent indication for admission.  Pt/family is agreeable and understands need for follow up and repeat testing.  Pt is aware that discharge does not mean that nothing is wrong but it indicates no emergency is present that requires admission and they must continue care with follow-up as given below or physician of their choice.     FOLLOW-UP  Filipe Feldman MD  [address] [facility] [address]  [phone]      March 17 as scheduled         Medication List      Notice     No changes were made to your prescriptions during this visit.            Latest Documented Vital Signs:  As of 2:04 AM  BP- 139/58 HR- 60 Temp- 98 °F (36.7 °C) O2 sat- 99%    --  Documentation assistance provided by melodie Marrufo for Dr. Pastor.  Information recorded by the scribe was done at my direction and has been verified and validated by me.       Gaviota Marrufo  02/17/17 0204       Tucker Pastor MD  02/17/17 0681

## 2017-03-03 PROBLEM — N17.9 ACUTE ON CHRONIC RENAL FAILURE (HCC): Status: ACTIVE | Noted: 2017-01-01

## 2017-03-03 PROBLEM — N18.9 ACUTE ON CHRONIC RENAL FAILURE (HCC): Status: ACTIVE | Noted: 2017-01-01

## 2017-03-03 NOTE — ED PROVIDER NOTES
" EMERGENCY DEPARTMENT ENCOUNTER    CHIEF COMPLAINT  Chief Complaint: Generalized Weakness  History given by: Patient  History limited by: Nothing  Room Number: 14/14  PMD: Catrina Santiago MD      HPI:  Pt is a 89 y.o. female who presents via EMS complaining of generalized weakness. EMS report that the patient was sent to the ED for decline in status. She reports that she is experiencing pain near her \"tailbone\" with no known injury. The patient reports that she has been living with her Daughter and has no other complaints. Patient is a poor historian and family is not at bedside.     Timing: Constant  Location: Generalized / \"Tailbone\"   Radiation: None  Quality: Weakness  Intensity/Severity: Moderate  Progression: No Change  Associated Symptoms: Generalized weakness, back pain  Aggravating Factors: Unknown  Alleviating Factors: None      PAST MEDICAL HISTORY  Active Ambulatory Problems     Diagnosis Date Noted   • Atrial fibrillation 12/26/2016   • Fall 12/26/2016   • Chronic kidney disease, stage IV (severe) 12/26/2016   • Left bundle branch block (LBBB) 12/27/2016   • Acute respiratory failure with hypoxia 12/27/2016   • Hypertension 12/27/2016   • COPD (chronic obstructive pulmonary disease) 12/27/2016   • Acute kidney injury 12/27/2016   • Weakness 12/27/2016   • UTI (urinary tract infection) 12/28/2016   • Abdominal wall hernia 12/29/2016   • Clostridium difficile colitis 12/30/2016   • Thrush 01/06/2017   • HAP (hospital-acquired pneumonia) 01/07/2017   • Influenza A 01/09/2017     Resolved Ambulatory Problems     Diagnosis Date Noted   • Acute on chronic congestive heart failure 12/26/2016     Past Medical History   Diagnosis Date   • Arthritis    • Asthma    • Cancer    • CHF (congestive heart failure)    • History of transfusion    • Renal disorder        PAST SURGICAL HISTORY  Past Surgical History   Procedure Laterality Date   • Hernia repair     • Colectomy partial / total Right 1989   • Hysterectomy   "   • Abdominal surgery     • Appendectomy     • Cholecystectomy Right    • Winter's neuroma excision Right    • Skin cancer excision     • Bronchoscopy N/A 1/12/2017     Procedure: BRONCHOSCOPY  WITH ANESTHESIA   with Bilateral  Lung  Washings;  Surgeon: Lester Andino MD;  Location: [facility] ENDOSCOPY;  Service:        FAMILY HISTORY  History reviewed. No pertinent family history.    SOCIAL HISTORY  Social History     Social History   • Marital status:      Spouse name: N/A   • Number of children: N/A   • Years of education: N/A     Occupational History   • Not on file.     Social History Main Topics   • Smoking status: Never Smoker   • Smokeless tobacco: Not on file   • Alcohol use Yes      Comment: social   • Drug use: No   • Sexual activity: Not on file     Other Topics Concern   • Not on file     Social History Narrative       ALLERGIES  Augmentin [amoxicillin-pot clavulanate]; Ceftin [cefuroxime axetil]; Ciprofloxacin; Colchicine; Garlic; Hydrocodone; Rabeprazole; and Tetracyclines & related    REVIEW OF SYSTEMS  Review of Systems   Constitutional: Negative for chills and fatigue.   HENT: Negative for congestion, rhinorrhea and sore throat.    Eyes: Negative for pain.   Respiratory: Negative for cough, shortness of breath and wheezing.    Cardiovascular: Negative for chest pain, palpitations and leg swelling.   Gastrointestinal: Negative for abdominal pain, diarrhea, nausea and vomiting.   Genitourinary: Negative for difficulty urinating, dysuria, flank pain and frequency.   Musculoskeletal: Positive for back pain. Negative for arthralgias, myalgias, neck pain and neck stiffness.   Skin: Negative for rash.   Neurological: Positive for weakness. Negative for dizziness, speech difficulty, light-headedness, numbness and headaches.   Psychiatric/Behavioral: Negative.    All other systems reviewed and are negative.      PHYSICAL EXAM  ED Triage Vitals   Temp Heart Rate Resp BP SpO2   03/03/17 1756 03/03/17 1756  03/03/17 1756 03/03/17 1756 03/03/17 1756   98.1 °F (36.7 °C) 69 18 144/60 96 %      Temp src Heart Rate Source Patient Position BP Location FiO2 (%)   03/03/17 1756 03/03/17 1756 -- -- --   Tympanic Monitor          Physical Exam   Constitutional: She is oriented to person, place, and time and well-developed, well-nourished, and in no distress. No distress.   Awake, alert and no acute distress. Patient is confused. Frail and elderly appearing    HENT:   Head: Normocephalic and atraumatic.   Eyes: EOM are normal. Pupils are equal, round, and reactive to light.   Neck: Normal range of motion. Neck supple.   Cardiovascular: Normal rate, regular rhythm and normal heart sounds.    Pulmonary/Chest: Effort normal and breath sounds normal. No respiratory distress.   Abdominal: Soft. There is no tenderness. There is no rebound and no guarding.   Musculoskeletal: Normal range of motion. She exhibits no edema.   Moves all extremities without pain.    Neurological: She is alert and oriented to person, place, and time. She has normal sensation and normal strength.   Disoriented to time and place. No focal neurological deficits    Skin: Skin is warm and dry. No rash noted.   Psychiatric: Mood and affect normal.   Nursing note and vitals reviewed.      LAB RESULTS  Lab Results (last 24 hours)     Procedure Component Value Units Date/Time    CBC & Differential [[id number]] Collected:  03/03/17 1846    Specimen:  Blood Updated:  03/03/17 1858    Narrative:       The following orders were created for panel order CBC & Differential.  Procedure                               Abnormality         Status                     ---------                               -----------         ------                     CBC Auto Differential[[id number]]         Abnormal            Final result                 Please view results for these tests on the individual orders.    Comprehensive Metabolic Panel [[id number]]  (Abnormal) Collected:  03/03/17 1846     Specimen:  Blood Updated:  03/03/17 1928     Glucose 100 (H) mg/dL       (H) mg/dL      Creatinine 5.11 (H) mg/dL      Sodium 134 (L) mmol/L      Potassium 5.6 (H) mmol/L      Chloride 90 (L) mmol/L      CO2 29.0 mmol/L      Calcium 13.8 (C) mg/dL      Total Protein 6.4 g/dL      Albumin 3.30 (L) g/dL      ALT (SGPT) 74 (H) U/L      AST (SGOT) 60 (H) U/L       Specimen hemolyzed.  Results may be affected.        Alkaline Phosphatase 148 (H) U/L      Total Bilirubin 0.3 mg/dL      eGFR Non African Amer 8 (L) mL/min/1.73      Globulin 3.1 gm/dL      A/G Ratio 1.1 g/dL      BUN/Creatinine Ratio 19.6      Anion Gap 15.0 mmol/L     Narrative:       The MDRD GFR formula is only valid for adults with stable renal function between ages 18 and 70.    Magnesium [[id number]]  (Abnormal) Collected:  03/03/17 1846    Specimen:  Blood Updated:  03/03/17 1916     Magnesium 2.9 (H) mg/dL     Urine Culture [[id number]] Collected:  03/03/17 1846    Specimen:  Urine from Urine, Catheter Updated:  03/03/17 1849    Troponin [[id number]]  (Abnormal) Collected:  03/03/17 1846    Specimen:  Blood Updated:  03/03/17 1922     Troponin T 0.089 (H) ng/mL     Narrative:       Troponin T Reference Ranges:  Less than 0.03 ng/mL:    Negative for AMI  0.03 to 0.09 ng/mL:      Indeterminant for AMI  Greater than 0.09 ng/mL: Positive for AMI    CBC Auto Differential [[id number]]  (Abnormal) Collected:  03/03/17 1846    Specimen:  Blood Updated:  03/03/17 1858     WBC 12.92 (H) 10*3/mm3      RBC 3.53 (L) 10*6/mm3      Hemoglobin 10.8 (L) g/dL      Hematocrit 32.5 (L) %      MCV 92.1 fL      MCH 30.6 pg      MCHC 33.2 g/dL      RDW 14.9 (H) %      RDW-SD 49.8 fl      MPV 11.0 fL      Platelets 438 10*3/mm3      Neutrophil % 73.9 %      Lymphocyte % 10.4 (L) %      Monocyte % 13.5 (H) %      Eosinophil % 1.5 %      Basophil % 0.5 %      Immature Grans % 0.2 %      Neutrophils, Absolute 9.54 (H) 10*3/mm3      Lymphocytes, Absolute 1.35 10*3/mm3       Monocytes, Absolute 1.75 (H) 10*3/mm3      Eosinophils, Absolute 0.19 10*3/mm3      Basophils, Absolute 0.06 10*3/mm3      Immature Grans, Absolute 0.03 10*3/mm3     Urinalysis With / Culture If Indicated [[id number]]  (Abnormal) Collected:  03/03/17 1847    Specimen:  Urine from Urine, Catheter Updated:  03/03/17 1957     Color, UA Yellow      Appearance, UA Cloudy (A)      pH, UA 5.5      Specific Gravity, UA 1.016      Glucose, UA Negative      Ketones, UA Negative      Bilirubin, UA Negative      Blood, UA Negative      Protein, UA Negative      Leuk Esterase, UA Moderate (2+) (A)      Nitrite, UA Negative      Urobilinogen, UA 0.2 E.U./dL     Urinalysis, Microscopic Only [[id number]]  (Abnormal) Collected:  03/03/17 1847    Specimen:  Urine from Urine, Catheter Updated:  03/03/17 1958     RBC, UA 0-2 (A) /HPF      WBC, UA 21-30 (A) /HPF      Bacteria, UA 1+ (A) /HPF      Squamous Epithelial Cells, UA None Seen /HPF      Hyaline Casts, UA 0-2 /LPF      Methodology Automated Microscopy     Urine Culture [[id number]] Collected:  03/03/17 1847    Specimen:  Urine from Urine, Catheter Updated:  03/03/17 1957          I ordered the above labs and reviewed the results    RADIOLOGY  XR Chest 1 View   Final Result   Opacity has developed at the left lung base, follow-up   recommended.       This report was finalized on 3/3/2017 7:22 PM by Dr. Nic King MD.             I ordered the above noted radiological studies. Interpreted by radiologist. Reviewed by me in PACS.       PROCEDURES  Procedures    EKG           EKG time: 18:55  Rhythm/Rate: SR 67 with occasional PAC's   P waves and DC: first degree AV block  QRS, axis: LBBB   ST and T waves: Normal     Interpreted Contemporaneously by me, independently viewed  No sig change compared to prior 1/16/17      PROGRESS AND CONSULTS  ED Course     18:16  Ordered Labs, EKG and CXR for further evaluation. Also ordered IVF for hydration.     19:31  Ordered Respiratory viral  panel for further evaluation. Also ordered Vancomycin to treat the patient's pneumonia.     19:36   Still no family at bedside    20:05  Discussed case with Dr Keyes. Reviewed history, exam, results and treatments.  Discussed concerns and plan of care. Dr Kyees accepts pt to be admitted.        MEDICAL DECISION MAKING  Results were reviewed/discussed with the patient and they were also made aware of online access. Pt also made aware that some labs, such as cultures, will not be resulted during ER visit and follow up with PMD is necessary.     MDM  Number of Diagnoses or Management Options     Amount and/or Complexity of Data Reviewed  Clinical lab tests: ordered and reviewed  Tests in the radiology section of CPT®: ordered and reviewed  Tests in the medicine section of CPT®: ordered and reviewed  Decide to obtain previous medical records or to obtain history from someone other than the patient: yes  Review and summarize past medical records: yes (Admitted 12/26/ 1/20 HCAP, Afib, acute kidney injury, acute respiratory failure with hypoxia and COPD. )    Patient Progress  Patient progress: stable         DIAGNOSIS  Final diagnoses:   Acute on chronic renal failure   Hyperkalemia   Hypercalcemia   Metabolic encephalopathy       DISPOSITION  ADMISSION    Discussed treatment plan and reason for admission with pt/family and admitting physician.  Pt/family voiced understanding of the plan for admission for further testing/treatment as needed.       Latest Documented Vital Signs:  As of 7:55 PM  BP- 137/61 HR- 66 Temp- 97.8 °F (36.6 °C) (Tympanic) O2 sat- 96%    --  Documentation assistance provided by melodie Ordoñez for .  Information recorded by the melodie was done at my direction and has been verified and validated by me.             Jermaine Ordoñez  03/03/17 2007       Sanju Sharma MD  03/03/17 2013

## 2017-03-03 NOTE — ED NOTES
Pt was sent in for decline in status per EMS. Pt was discharged from [facility] on 2/25/2017 was able to walk and now unable to walk. Pt c/o pain with urination.      Martha Warner RN  03/03/17 7105

## 2017-03-04 PROBLEM — A41.9 SEPSIS (HCC): Status: ACTIVE | Noted: 2017-01-01

## 2017-03-04 PROBLEM — J18.9 PNEUMONIA DUE TO INFECTIOUS ORGANISM: Status: ACTIVE | Noted: 2017-01-01

## 2017-03-04 NOTE — ED PROVIDER NOTES
Subjective   History of Present Illness    Review of Systems    Past Medical History   Diagnosis Date   • Arthritis    • Asthma    • Atrial fibrillation    • Cancer      multiple skincancers   • CHF (congestive heart failure)    • History of transfusion      1989   • Hypertension    • Renal disorder        Allergies   Allergen Reactions   • Augmentin [Amoxicillin-Pot Clavulanate]    • Ceftin [Cefuroxime Axetil]    • Ciprofloxacin    • Colchicine    • Garlic    • Hydrocodone    • Rabeprazole    • Tetracyclines & Related        Past Surgical History   Procedure Laterality Date   • Hernia repair     • Colectomy partial / total Right 1989   • Hysterectomy     • Abdominal surgery     • Appendectomy     • Cholecystectomy Right    • Winter's neuroma excision Right    • Skin cancer excision     • Bronchoscopy N/A 1/12/2017     Procedure: BRONCHOSCOPY  WITH ANESTHESIA   with Bilateral  Lung  Washings;  Surgeon: Lester Andino MD;  Location: [facility] ENDOSCOPY;  Service:        History reviewed. No pertinent family history.    Social History     Social History   • Marital status:      Spouse name: N/A   • Number of children: N/A   • Years of education: N/A     Social History Main Topics   • Smoking status: Never Smoker   • Smokeless tobacco: None   • Alcohol use Yes      Comment: social   • Drug use: No   • Sexual activity: Not Asked     Other Topics Concern   • None     Social History Narrative           Objective   Physical Exam    Procedures         ED Course  ED Course                  MDM    Final diagnoses:   None

## 2017-03-04 NOTE — ED NOTES
Pt to room 659 at this time via stretcher per transport team.     Fern Gutierrez RN  03/03/17 5159

## 2017-03-04 NOTE — PROGRESS NOTES
Seen by my partner earlier this AM  Chart reviewed    1. Sepsis due to #2 & #3  2. LLL HCAP  3. UTI  4. Acute hypoxic resp failure  5. LANCE/CKD4  6. Recent CDiff colitis  7. HyperK+/Ca++/Mg++  8. Elevated Trop  9. LBBB  10. Afib (no AC due to falls)  11. COPD  12. HTN  13. HypoT4    Will ask Pulm/Renal/ID to follow  Trend Troponins but suspect just due to LANCE  Repeat EKG in AM  Continue Vanc/Aztreonam (ID input given recurrent PNA and recent CDiff)  Will follow tomorrow

## 2017-03-04 NOTE — H&P
Name: Clementina Betancur ADMIT: 3/3/2017   : 1927  PCP: Catrina Santiago MD    MRN: [id number] LOS: 1 days   AGE/SEX: 89 y.o. female  ROOM: [facility]/     Chief Complaint   Patient presents with   • Weakness - Generalized   • Female Dysuria       Subjective   Ms Betancur is a pleasant 89yoF with a history of CKD4, CHF, COPD presents with worsening weakness and confusion. She reports no CP. Some SOA but no cough. Does report dysuria but denies flank pain. Reports she has been eating less because of nausea and vomiting. Denies feeling of food being stuck or coughing when swallowing. However, patient is poor historian so history is limited.    History of Present Illness    Past Medical History   Diagnosis Date   • Acute respiratory failure with hypoxia    • Arthritis    • Asthma    • Atrial fibrillation    • Cancer      multiple skincancers   • Candidal stomatitis    • CHF (congestive heart failure)    • Chronic kidney disease, stage 4, severely decreased GFR    • COPD (chronic obstructive pulmonary disease)    • Enterocolitis due to Clostridium difficile 2016     positive specimen collected on 2016   • History of falling    • History of transfusion         • Hypertension    • Hypothyroidism    • Muscle weakness (generalized)    • Need for assistance with personal care    • Renal disorder    • Weakness      Past Surgical History   Procedure Laterality Date   • Hernia repair     • Colectomy partial / total Right    • Hysterectomy     • Abdominal surgery     • Appendectomy     • Cholecystectomy Right    • Winter's neuroma excision Right    • Skin cancer excision     • Bronchoscopy N/A 2017     Procedure: BRONCHOSCOPY  WITH ANESTHESIA   with Bilateral  Lung  Washings;  Surgeon: Lester Andino MD;  Location: [facility];  Service:      History reviewed. No pertinent family history.  Social History   Substance Use Topics   • Smoking status: Never Smoker   • Smokeless tobacco: None   • Alcohol  use Yes      Comment: social     Prescriptions Prior to Admission   Medication Sig Dispense Refill Last Dose   • acetaminophen-codeine (TYLENOL #3) 300-30 MG per tablet Take 0.5 tablets by mouth 3 (Three) Times a Day. 0.5 tablet in the morning and noon. 1 tablet at bed time. 12 tablet 0    • amiodarone (PACERONE) 200 MG tablet Take 200 mg by mouth 2 (Two) Times a Day.      • bumetanide (BUMEX) 1 MG tablet Take 1 mg by mouth Daily.      • Cholecalciferol (VITAMIN D3) 5000 UNITS capsule capsule Take 5,000 Units by mouth Daily.   12/26/2016   • diltiaZEM CD (CARDIZEM CD) 120 MG 24 hr capsule Take 1 capsule by mouth Daily.      • fluticasone (FLONASE) 50 MCG/ACT nasal spray 2 sprays into each nostril Daily.      • Glycerin-Hypromellose- (ARTIFICIAL TEARS) 0.2-0.2-1 % solution ophthalmic solution Administer 1 drop to both eyes Every 1 (One) Hour As Needed for dry eyes.  0    • hydrALAZINE (APRESOLINE) 25 MG tablet Take 3 tablets by mouth 3 (Three) Times a Day.      • ipratropium-albuterol (DUO-NEB) 0.5-2.5 mg/mL nebulizer Take 3 mL by nebulization Every 4 (Four) Hours As Needed for shortness of air. 360 mL     • levothyroxine (LEVOTHROID) 75 MCG tablet Take 75 mcg by mouth Daily.      • Multiple Vitamins-Minerals (CENTRUM SILVER PO) Take 1 tablet by mouth Daily.   12/26/2016 at Unknown time   • phenol (CHLORASEPTIC) 1.4 % liquid liquid Apply 2 sprays to the mouth or throat Every 2 (Two) Hours As Needed (sore throat).  0    • potassium chloride (K-DUR) 10 MEQ CR tablet Take 10 mEq by mouth Daily.      • promethazine (PHENERGAN) 25 MG tablet Take 12.5 mg by mouth Every 4 (Four) Hours As Needed for nausea or vomiting.      • sodium chloride (OCEAN) 0.65 % nasal spray 2 sprays into each nostril As Needed for congestion.  12    • white petrolatum (VASELINE) gel gel Apply  topically Daily. Apply to upper lip topically      • acetaminophen (TYLENOL) 325 MG tablet Take 2 tablets by mouth Every 4 (Four) Hours As Needed for  mild pain (1-3).  0    • ipratropium-albuterol (DUO-NEB) 0.5-2.5 mg/mL nebulizer Take 3 mL by nebulization 4 (Four) Times a Day. 360 mL     • levothyroxine (SYNTHROID, LEVOTHROID) 50 MCG tablet Take 50 mcg by mouth Daily.   12/26/2016 at Unknown time     Allergies:  Augmentin [amoxicillin-pot clavulanate]; Ceftin [cefuroxime axetil]; Ciprofloxacin; Colchicine; Garlic; Hydrocodone; Rabeprazole; and Tetracyclines & related    Review of Systems   Unable to perform ROS: Mental status change        Objective    Vital Signs  Temp:  [96.8 °F (36 °C)-98.1 °F (36.7 °C)] 97.8 °F (36.6 °C)  Heart Rate:  [65-70] 70  Resp:  [16-19] 16  BP: (121-144)/(50-81) 129/54  SpO2:  [85 %-98 %] 98 %  on  Flow (L/min):  [2] 2;   O2 Device: nasal cannula  Body mass index is 23.05 kg/(m^2).    Physical Exam   Constitutional: She appears well-developed. No distress.   HENT:   Head: Normocephalic and atraumatic.   Nose: Nose normal.   Eyes: Conjunctivae and EOM are normal. Pupils are equal, round, and reactive to light.   Neck: Normal range of motion. Neck supple.   Cardiovascular: Normal rate, regular rhythm, normal heart sounds and intact distal pulses.    Pulmonary/Chest: Effort normal. No respiratory distress. She has no wheezes. She has rhonchi in the left lower field. She has rales in the left lower field.   Abdominal: Soft. Bowel sounds are normal. She exhibits no distension. There is no tenderness. There is no guarding.   Musculoskeletal: Normal range of motion. She exhibits no edema.   Neurological: She is alert. No cranial nerve deficit. She exhibits normal muscle tone.   Skin: Skin is warm and dry. She is not diaphoretic.   Psychiatric: She has a normal mood and affect. Her behavior is normal.   Nursing note and vitals reviewed.      Results Review:   I reviewed the patient's new clinical results. Reviewed labs. Reviewed imaging and agree with interpretation. Reviewed EKGs showing LBBB and LAFB, no st changes.    Assessment/Plan      Active Problems:    Atrial fibrillation    Left bundle branch block (LBBB)    Acute respiratory failure with hypoxia    COPD (chronic obstructive pulmonary disease)    UTI (urinary tract infection)    Acute on chronic renal failure    Pneumonia due to infectious organism    Sepsis      Assessment & Plan    - +sepsis screen with source UTI and PNA. UTI with infectious UA. CXR with LLL infiltrate and requiring supplemental oxygen. Will check Cx and tailor to results. She has allergy listed for cephalosporin, ciprofloxacin, and amoxicillin so will cover with Vancomycin and Aztreonam.  -LANCE on CKD4. Will give IVF and monitor Cr function in the morning. Elevated BUN and K from renal failure. Will monitor and consider Renal consult if not improved.  -Lovenox  -Full Code    I discussed the patients findings and my recommendations with patient.    Oleksandr Keyes MD  [facility]  03/04/17  1:41 AM

## 2017-03-04 NOTE — PLAN OF CARE
Problem: Patient Care Overview (Adult)  Goal: Plan of Care Review  Outcome: Ongoing (interventions implemented as appropriate)    03/03/17 2302   Patient Care Overview   Progress no change       Goal: Adult Individualization and Mutuality  Outcome: Ongoing (interventions implemented as appropriate)  Goal: Discharge Needs Assessment  Outcome: Ongoing (interventions implemented as appropriate)    03/03/17 2255 03/03/17 2302   Discharge Needs Assessment   Concerns To Be Addressed --  no discharge needs identified   Readmission Within The Last 30 Days --  no previous admission in last 30 days   Equipment Needed After Discharge --  none   Discharge Disposition --  still a patient   Current Health   Anticipated Changes Related to Illness --  none   Living Environment   Transportation Available --  car;family or friend will provide   Self-Care   Equipment Currently Used at Home cane, straight;bath bench;walker, standard --          Problem: Renal Failure/Kidney Injury, Acute (Adult)  Goal: Signs and Symptoms of Listed Potential Problems Will be Absent or Manageable (Renal Failure/Kidney Injury, Acute)  Outcome: Ongoing (interventions implemented as appropriate)    03/03/17 2302 03/04/17 1825   Renal Failure/Kidney Injury, Acute   Problems Assessed (Acute Renal Failure/Kidney Injury) --  all   Problems Present (Acute Renal Failure/Kidney Injury) electrolyte imbalance;hypertension;situational response --

## 2017-03-04 NOTE — CONSULTS
Referring Provider: Dr. Levy  Reason for Consultation: LANCE/CKDIV    Subjective     Chief complaint Weakness    History of present illness:  Patient is an 89 yo WF with h/o CKDIV who has a baseline Cr in the mid 2's.  She presents wiith progressive weakness and confusion.  She was noted to have a significant LANCE on admission with hyperkalemia and hypercalcemia.  She has been started on treatment for UTI and PNA.  She is c/o dry mouth and lips with generalized pain.  No CP.  No SOA.  No N/V/D.  No other complaints.    History  Past Medical History   Diagnosis Date   • Acute respiratory failure with hypoxia    • Arthritis    • Asthma    • Atrial fibrillation    • Cancer      multiple skincancers   • Candidal stomatitis    • CHF (congestive heart failure)    • Chronic kidney disease, stage 4, severely decreased GFR    • COPD (chronic obstructive pulmonary disease)    • Enterocolitis due to Clostridium difficile 12/29/2016     positive specimen collected on 12/29/2016   • History of falling    • History of transfusion      1989   • Hypertension    • Hypothyroidism    • Muscle weakness (generalized)    • Need for assistance with personal care    • Renal disorder    • Weakness    ,   Past Surgical History   Procedure Laterality Date   • Hernia repair     • Colectomy partial / total Right 1989   • Hysterectomy     • Abdominal surgery     • Appendectomy     • Cholecystectomy Right    • Winter's neuroma excision Right    • Skin cancer excision     • Bronchoscopy N/A 1/12/2017     Procedure: BRONCHOSCOPY  WITH ANESTHESIA   with Bilateral  Lung  Washings;  Surgeon: Lester Andino MD;  Location: [facility] ENDOSCOPY;  Service:    , History reviewed. No pertinent family history.,   Social History   Substance Use Topics   • Smoking status: Never Smoker   • Smokeless tobacco: None   • Alcohol use Yes      Comment: social   ,   Prescriptions Prior to Admission   Medication Sig Dispense Refill Last Dose   • acetaminophen-codeine  (TYLENOL #3) 300-30 MG per tablet Take 0.5 tablets by mouth 3 (Three) Times a Day. 0.5 tablet in the morning and noon. 1 tablet at bed time. 12 tablet 0    • amiodarone (PACERONE) 200 MG tablet Take 200 mg by mouth 2 (Two) Times a Day.      • bumetanide (BUMEX) 1 MG tablet Take 1 mg by mouth Daily.      • Cholecalciferol (VITAMIN D3) 5000 UNITS capsule capsule Take 5,000 Units by mouth Daily.   12/26/2016   • diltiaZEM CD (CARDIZEM CD) 120 MG 24 hr capsule Take 1 capsule by mouth Daily.      • fluticasone (FLONASE) 50 MCG/ACT nasal spray 2 sprays into each nostril Daily.      • Glycerin-Hypromellose- (ARTIFICIAL TEARS) 0.2-0.2-1 % solution ophthalmic solution Administer 1 drop to both eyes Every 1 (One) Hour As Needed for dry eyes.  0    • hydrALAZINE (APRESOLINE) 25 MG tablet Take 3 tablets by mouth 3 (Three) Times a Day.      • ipratropium-albuterol (DUO-NEB) 0.5-2.5 mg/mL nebulizer Take 3 mL by nebulization Every 4 (Four) Hours As Needed for shortness of air. 360 mL     • levothyroxine (LEVOTHROID) 75 MCG tablet Take 75 mcg by mouth Daily.      • Multiple Vitamins-Minerals (CENTRUM SILVER PO) Take 1 tablet by mouth Daily.   12/26/2016 at Unknown time   • phenol (CHLORASEPTIC) 1.4 % liquid liquid Apply 2 sprays to the mouth or throat Every 2 (Two) Hours As Needed (sore throat).  0    • potassium chloride (K-DUR) 10 MEQ CR tablet Take 10 mEq by mouth Daily.      • promethazine (PHENERGAN) 25 MG tablet Take 12.5 mg by mouth Every 4 (Four) Hours As Needed for nausea or vomiting.      • sodium chloride (OCEAN) 0.65 % nasal spray 2 sprays into each nostril As Needed for congestion.  12    • white petrolatum (VASELINE) gel gel Apply  topically Daily. Apply to upper lip topically      • acetaminophen (TYLENOL) 325 MG tablet Take 2 tablets by mouth Every 4 (Four) Hours As Needed for mild pain (1-3).  0    • ipratropium-albuterol (DUO-NEB) 0.5-2.5 mg/mL nebulizer Take 3 mL by nebulization 4 (Four) Times a Day. 360  mL     • levothyroxine (SYNTHROID, LEVOTHROID) 50 MCG tablet Take 50 mcg by mouth Daily.   12/26/2016 at Unknown time   , Scheduled Meds:    amiodarone 200 mg Oral BID   cefepime 1 g Intravenous Q24H   diltiaZEM  mg Oral Q24H   enoxaparin 30 mg Subcutaneous Daily   fluticasone 2 spray Nasal Daily   hydrALAZINE 75 mg Oral TID   levothyroxine 75 mcg Oral Q AM   , Continuous Infusions:    sodium chloride 75 mL/hr Last Rate: 75 mL/hr (03/04/17 0450)   , PRN Meds:  •  acetaminophen  •  Glycerin-Hypromellose-  •  HYDROmorphone **AND** naloxone  •  ipratropium-albuterol  •  nitroglycerin  •  ondansetron **OR** ondansetron ODT **OR** ondansetron  •  oxyCODONE-acetaminophen  •  sennosides-docusate sodium  •  sodium chloride  •  Insert peripheral IV **AND** sodium chloride and Allergies:  Augmentin [amoxicillin-pot clavulanate]; Ceftin [cefuroxime axetil]; Ciprofloxacin; Colchicine; Garlic; Hydrocodone; Rabeprazole; and Tetracyclines & related    Review of Systems  Pertinent items are noted in HPI    Objective     Vital Signs  Temp:  [96.7 °F (35.9 °C)-98.1 °F (36.7 °C)] 96.7 °F (35.9 °C)  Heart Rate:  [60-70] 60  Resp:  [16-19] 18  BP: (100-144)/(46-81) 100/46       I/O last 3 completed shifts:  In: 2060 [P.O.:60; I.V.:1000; IV Piggyback:1000]  Out: 550 [Urine:550]    Physical Exam:     General Appearance:    Alert, cooperative, in no acute distress   Head:    Normocephalic, without obvious abnormality, atraumatic   Eyes:            Lids and lashes normal, conjunctivae and sclerae normal, no   icterus, no pallor, corneas clear, PERRLA   Ears:    Ears appear intact with no abnormalities noted   Throat:   No oral lesions, no thrush, oral mucosa moist   Neck:   No adenopathy, supple, trachea midline, no thyromegaly, no     carotid bruit, no JVD   Back:     No kyphosis present, no scoliosis present, no skin lesions,       erythema or scars, no tenderness to percussion or                   palpation,   range of  motion normal   Lungs:     Clear to auscultation,respirations regular, even and                   unlabored    Heart:    Regular rhythm and normal rate, normal S1 and S2, no            murmur, no gallop, no rub, no click   Breast Exam:    Deferred   Abdomen:     Normal bowel sounds, no masses, no organomegaly, soft        non-tender, non-distended, no guarding, no rebound                 tenderness   Genitalia:    Deferred   Extremities:   Moves all extremities well, no edema, no cyanosis, no              redness   Pulses:   Pulses palpable and equal bilaterally   Skin:   No bleeding, bruising or rash   Lymph nodes:   No palpable adenopathy   Neurologic:   Cranial nerves 2 - 12 grossly intact, sensation intact, DTR        present and equal bilaterally       Results Review:   I reviewed the patient's new clinical results.    WBC WBC   Date Value Ref Range Status   03/04/2017 12.47 (H) 4.50 - 10.70 10*3/mm3 Final   03/03/2017 12.92 (H) 4.50 - 10.70 10*3/mm3 Final      HGB HEMOGLOBIN   Date Value Ref Range Status   03/04/2017 10.2 (L) 11.9 - 15.5 g/dL Final   03/03/2017 10.8 (L) 11.9 - 15.5 g/dL Final      HCT HEMATOCRIT   Date Value Ref Range Status   03/04/2017 30.4 (L) 35.6 - 45.5 % Final   03/03/2017 32.5 (L) 35.6 - 45.5 % Final      Platlets No results found for: LABPLAT   MCV MCV   Date Value Ref Range Status   03/04/2017 91.3 80.5 - 98.2 fL Final   03/03/2017 92.1 80.5 - 98.2 fL Final          Sodium SODIUM   Date Value Ref Range Status   03/04/2017 135 (L) 136 - 145 mmol/L Final   03/03/2017 134 (L) 136 - 145 mmol/L Final      Potassium POTASSIUM   Date Value Ref Range Status   03/04/2017 5.0 3.5 - 5.2 mmol/L Final   03/03/2017 5.6 (H) 3.5 - 5.2 mmol/L Final      Chloride CHLORIDE   Date Value Ref Range Status   03/04/2017 94 (L) 98 - 107 mmol/L Final   03/03/2017 90 (L) 98 - 107 mmol/L Final      CO2 CO2   Date Value Ref Range Status   03/04/2017 26.3 22.0 - 29.0 mmol/L Final   03/03/2017 29.0 22.0 - 29.0  mmol/L Final      BUN BUN   Date Value Ref Range Status   03/04/2017 106 (H) 8 - 23 mg/dL Final   03/03/2017 100 (H) 8 - 23 mg/dL Final      Creatinine CREATININE   Date Value Ref Range Status   03/04/2017 4.82 (H) 0.57 - 1.00 mg/dL Final   03/03/2017 5.11 (H) 0.57 - 1.00 mg/dL Final      Calcium CALCIUM   Date Value Ref Range Status   03/04/2017 11.9 (H) 8.6 - 10.5 mg/dL Final   03/03/2017 13.8 (C) 8.6 - 10.5 mg/dL Final      PO4 No results found for: CAPO4   Albumin ALBUMIN   Date Value Ref Range Status   03/04/2017 2.70 (L) 3.50 - 5.20 g/dL Final   03/03/2017 3.30 (L) 3.50 - 5.20 g/dL Final      Magnesium MAGNESIUM   Date Value Ref Range Status   03/04/2017 2.7 (H) 1.6 - 2.4 mg/dL Final   03/03/2017 2.9 (H) 1.6 - 2.4 mg/dL Final      Uric Acid No results found for: URICACID           amiodarone 200 mg Oral BID   cefepime 1 g Intravenous Q24H   diltiaZEM  mg Oral Q24H   enoxaparin 30 mg Subcutaneous Daily   fluticasone 2 spray Nasal Daily   hydrALAZINE 75 mg Oral TID   levothyroxine 75 mcg Oral Q AM       sodium chloride 75 mL/hr Last Rate: 75 mL/hr (03/04/17 0450)       Assessment/Plan     Principal Problem:    Sepsis  Active Problems:    Atrial fibrillation    Left bundle branch block (LBBB)    Acute respiratory failure with hypoxia    COPD (chronic obstructive pulmonary disease)    UTI (urinary tract infection)    Acute on chronic renal failure    Pneumonia due to infectious organism  LANCE/CKDIV - Pre-renal/ATN from volume depletion, ?sepsis.  Had been on diuretics and KCl as an outpatient.  Hypercalcemia  Hyperkalemia    PLAN: Patient with severe LANCE and clinical volume depletion.  R/O obstruction.  Diuretics and KCl on hold.  Continue NS today.  Hypercalcemia improving.  ABX per primary.  Continue to avoid nephrotoxins.  Will follow closely.    Jagjit Wood MD  03/04/17  @NOW

## 2017-03-04 NOTE — PROGRESS NOTES
"Pharmacy to Dose Vancomycin IV    Day 1  Consult for Dr. Keyes  Treating: Sepsis  Goal random: 15-20 mcg/mL      IV Anti-Infectives     Ordered     Dose/Rate Route Frequency Start Stop    03/04/17 0023  aztreonam (AZACTAM) 1 g/100 mL 0.9% NS IVPB (mbp)     Ordering Provider:  Oleksandr Keyes MD    1 g  over 30 Minutes Intravenous Every 8 Hours 03/04/17 0100      03/04/17 0023  Pharmacy to dose vancomycin     Ordering Provider:  Oleksandr Keyes MD     Does not apply Continuous PRN 03/04/17 0022 03/03/17 1932  vancomycin (VANCOCIN) IVPB 1 g (premix) in Dextrose 5% 200 mL     Ordering Provider:  Sanju Sharma MD    20 mg/kg × 53.5 kg Intravenous Once 03/1934 03/03/17 2020      Relevant clinical data and objective history reviewed:  89 y.o. female 60\" (152.4 cm) 118 lb (53.5 kg)  Body mass index is 23.05 kg/(m^2).    Results from last 7 days  Lab Units 03/03/17  1846   CREATININE mg/dL 5.11*     Estimated Creatinine Clearance: 5.4 mL/min (by C-G formula based on Cr of 5.11).    Lab Results   Component Value Date    WBC 12.92 (H) 03/03/2017     Temp:  [96.8 °F (36 °C)-98.1 °F (36.7 °C)] 97.8 °F (36.6 °C)  Heart Rate:  [65-70] 70  Resp:  [16-19] 16  BP: (121-144)/(50-81) 129/54    Intake/Output Summary (Last 24 hours) at 03/04/17 0029  Last data filed at 03/03/17 2020   Gross per 24 hour   Intake   1000 ml   Output      0 ml   Net   1000 ml       IV Meds:  sodium chloride 125 mL/hr Last Rate: 125 mL/hr (03/03/17 1853)     Baseline cultures/labs/radiology:  3/03 Urine cx: In process  3/03 UA: 21-30 WBC, 1+bacteria//2+ leuk esterase  3/03 RVP: None detected  2/03 CXR: Opacity has developed at the left lung base  Blood cx, Sputum cx & Lactic acid = ordered    Assessment/Plan:   Patient has a past medical history of Acute respiratory failure with hypoxia; Arthritis; Asthma; Atrial fibrillation; Cancer; Candidal stomatitis; CHF; CKD stage 4, severely decreased GFR; COPD; Enterocolitis due to Clostridium " "difficile (12/29/2016); History of falling; History of transfusion; Hypertension; Hypothyroidism; Muscle weakness (generalized); Need for assistance with personal care; Renal disorder; and Weakness.    Pt complains of generalized weakness & pain near \"tailbone.\"    Will check random level in AM for further dosing.    Vancomycin Dosing History:  03/03/17 20:20 Vancomycin 1000 mg IV x1 (18.7 mg/Kg)    Thank you for your consult,    Holly Snyder [facility]  03/04/17 12:29 AM                  "

## 2017-03-04 NOTE — PLAN OF CARE
Problem: Patient Care Overview (Adult)  Goal: Plan of Care Review  Outcome: Ongoing (interventions implemented as appropriate)    03/03/17 2302   Coping/Psychosocial Response Interventions   Plan Of Care Reviewed With patient   Patient Care Overview   Progress no change       Goal: Discharge Needs Assessment  Outcome: Ongoing (interventions implemented as appropriate)    03/03/17 2255 03/03/17 2302   Discharge Needs Assessment   Concerns To Be Addressed --  no discharge needs identified   Readmission Within The Last 30 Days --  no previous admission in last 30 days   Equipment Needed After Discharge --  none   Discharge Disposition --  still a patient   Current Health   Anticipated Changes Related to Illness --  none   Living Environment   Transportation Available --  car;family or friend will provide   Self-Care   Equipment Currently Used at Home cane, straight;bath bench;walker, standard --          Problem: Renal Failure/Kidney Injury, Acute (Adult)  Intervention: Prevent/Manage Infection    03/03/17 2302   Safety Interventions   Infection Prevention environmental surveillance performed   Safety Interventions   Infection Management aseptic technique maintained   Pain/Comfort/Sleep Interventions   Sleep/Rest Enhancement awakenings minimized;regular sleep/rest pattern promoted   Hygiene Care Assistance   Oral Care lip lubricant applied;oral care provided       Intervention: Monitor/Manage Fluid, Acid Base Balance    03/03/17 2302   Safety Interventions   Medication Review/Management medications reviewed   Positioning   Positioning supine, head elevated       Intervention: Evaluate/Maintain Nutrition Support    03/03/17 2302   Hygiene Care Assistance   Oral Care lip lubricant applied;oral care provided   Coping/Psychosocial Interventions   Environmental Support calm environment promoted       Intervention: Monitor/Manage Anemia/Signs of Bleeding    03/03/17 2302   Safety Interventions   Bleeding Precautions blood  pressure closely monitored       Intervention: Promote Energy Conservation    03/03/17 2302   Pain/Comfort/Sleep Interventions   Sleep/Rest Enhancement awakenings minimized;regular sleep/rest pattern promoted   Coping/Psychosocial Interventions   Environmental Support calm environment promoted       Intervention: Provide Oxygenation/Ventilation/Perfusion Support    03/03/17 2302   Safety Interventions   Medication Review/Management medications reviewed   Positioning   Head Of Bed (HOB) Position HOB at 20-30 degrees   Activity   Activity Type bedrest         Goal: Signs and Symptoms of Listed Potential Problems Will be Absent or Manageable (Renal Failure/Kidney Injury, Acute)  Outcome: Ongoing (interventions implemented as appropriate)    03/03/17 2302   Renal Failure/Kidney Injury, Acute   Problems Assessed (Acute Renal Failure/Kidney Injury) all   Problems Present (Acute Renal Failure/Kidney Injury) electrolyte imbalance;hypertension;situational response

## 2017-03-04 NOTE — PROGRESS NOTES
Pharmacy to dose vancomycin per Dr. Keyes’s request    Dx: Sepsis……….. tx with aztreonam 1gm q8hr  and vanc  (allergic augmentin, ceftin, Cipro, tcn)  (PNA,  UTI)    PMH: acute ESRD failure w hypoxia, asthma, CHF, cancer,CKD stage 4, COPD, Enterocolitis due to C Diff.    Baseline cultures/labs/radiology:  3/03 Urine cx: In process  3/03 UA: 21-30 WBC, 1+bacteria//2+ leuk esterase  3/03 RVP: None detected  2/03 CXR: Opacity has developed at the left lung base      3/3 20:20  Vancomycin 1gm iv x 1 (18.7 mg/kg) x 1 dose  ¾   6:12 am  random level…. 12 mcg/ml    Scr=  5.11  4.82 (do not see HD)     PLAN:  Intermittent dosing… Give  1gm again x 1,  random Sunday am.                         89 y.o.118 lb (53.5 kg)Estimated Creatinine Clearance: 5.7 mL/min (by C-G formula based on Cr of 4.82).     URINE CULTURE   Date Value Ref Range Status   03/03/2017 Culture in progress  Preliminary         Results from last 7 days  Lab Units 03/04/17  0612 03/03/17  1846   WBC 10*3/mm3 12.47* 12.92*       Results from last 7 days  Lab Units 03/04/17  0612 03/03/17  1846   CREATININE mg/dL 4.82* 5.11*     Estimated Creatinine Clearance: 5.7 mL/min (by C-G formula based on Cr of 4.82).  Temp Readings from Last 3 Encounters:   03/04/17 96.7 °F (35.9 °C) (Oral)   02/17/17 97.7 °F (36.5 °C)   01/20/17 98.1 °F (36.7 °C) (Oral)      No results found for: MIRANDA  Lab Results   Component Value Date    VANCORANDOM 12.00 03/04/2017     Tomer Coleman, Pharm.D., RP, BCPS

## 2017-03-04 NOTE — CONSULTS
Referring Provider: Oleksandr Keyes MD  Reason for Consultation: SEPSIS      Subjective   History of present illness:    This is a very nice 89-year-old who was admitted on 03/03/2017. Infectious Disease Service is asked to provide antibiotic recommendations.     Per review of the past medical record, her story dates back to 12/26/2016, when she was admitted with shortness of air and weakness. Labs were notable for profound hepatitis with an AST of 961 and an ALT of 479, white count of 10.3 and a pct of 1. Chest x-ray showed findings consistent with CHF. UA had 6-12 white blood cells and she grew Proteus so was started on Ceftriaxone with Doxycycline which was soon stopped and she completed 3 days of Omnicef. She began to have diarrhea so C. diff was checked (which was positive) and she was started on Flagyl. She was later transitioned to p.o. Vancomycin.     She was then readmitted in January with difficulty breathing and worsening productive cough. No fevers. Chest x-ray showed a small infiltrate. She was started on Vancomycin and Zosyn. She was later transitioned to Cefepime and developed C. difficile colitis for which she was treated with Vancomycin.     The patient is encephalopathic. She is unable to provide me any history. Per ER notes, it looks like she presented with 3-4 days of decline in status. She had apparently fallen and hit her tailbone. She had reported some shortness of air and dysuria. Apparently no fevers.  Her only complaint to me is that her lips are dry. She does not feel better yet.     PAST MEDICAL HISTORY:   1.  Osteoarthritis.   2.  Atrial fibrillation.  3.  COPD.  4.  Hypertension.   5.  CKD Stage 4.   6.  Abdominal hernia.   7.  Multiple skin cancers.   8. C. difficile colitis.   9. Hypothyroidism.  10. Multiple abdominal surgeries.   11. Hysterectomy.     No family history of C. difficile colitis.     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     ALLERGIES: MULTIPLE ANTIBIOTIC ALLERGIES ARE LISTED INCLUDING TO AUGMENTIN AND CEFTIN.  Of note, she just tolerated Cefepime and Zosyn during her last admission here. She also has a Ciprofloxacin allergy for which she just says “I don’t remember”. There is also an allergy listed to tetracycline but again that was well tolerated in December.    Review of Systems  Review of systems could not be obtained due to   patient confusion.    Objective     Physical Exam:   Vital Signs   Temp:  [96.7 °F (35.9 °C)-98.1 °F (36.7 °C)] 96.7 °F (35.9 °C)  Heart Rate:  [60-70] 60  Resp:  [16-19] 18  BP: (100-144)/(46-81) 100/46    GENERAL: Awake and alert, in no acute distress.   HEENT: Oropharynx is clear. Hearing is grossly [place].   EYES: PERRL. No conjunctival injection. No lid lag.   LYMPHATICS: No lymphadenopathy of the neck or axillary regions.   HEART: Regular rate and rhythm. No peripheral edema.   LUNGS: Clear to auscultation anteriorly with normal respiratory effort.   GI: Soft, nontender, nondistended. No appreciable organomegaly.   SKIN: Warm and dry without cutaneous eruptions   PSYCHIATRIC:Confused. Flat affect.  No insight, and judgment.     Results Review:   I reviewed the patient's new clinical results.    Lab Results   Component Value Date    WBC 12.47 (H) 03/04/2017    HGB 10.2 (L) 03/04/2017    HCT 30.4 (L) 03/04/2017    MCV 91.3 03/04/2017     03/04/2017       Lab Results   Component Value Date    GLUCOSE 82 03/04/2017     (H) 03/04/2017    CREATININE 4.82 (H) 03/04/2017    EGFRIFNONA 9 (L) 03/04/2017    BCR 22.0 03/04/2017    CO2 26.3 03/04/2017    CALCIUM 11.9 (H) 03/04/2017    ALBUMIN 2.70 (L) 03/04/2017    LABIL2 1.0 03/04/2017    AST 60 (H) 03/04/2017    ALT 69 (H) 03/04/2017         Estimated Creatinine Clearance: 5.7 mL/min (by C-G formula based on Cr of 4.82).      Microbiology:  3/3 RVP p  3/4 bcx ngtd  3/4 mrsa screen      Radiology:   CXR, independently  interpreted: LLL PNA      Assessment/Plan   1. AHRF  2. LANCE  3. Sepsis?  4. H/o CDI    Given her life-threatening C. difficile colitis just one to 2 months go, we need to be very judicious in prescribing antibiotics. Certainly her acute kidney injury could explain her severe weakness as well as her pyuria and minimally elevated Procalcitonin. Change abx to cefepime which is not nephrotoxic. I will obtain CT of her chest without contrast to better visualize her lung disease.  If this does show significant lobar pneumonia, then I think the thing to do would be to treat her with cefepime.  She tolerated Zosyn and cefepime last admission without any difficulties such I doubt her her beta-lactam allergies are correct.  Agree with excellent workup to date.    Thank you for this consult.  We will continue to follow along and tailor antibiotics as the patient's clinical course evolves.      Miguel Demarco MD  03/04/17  12:32 PM

## 2017-03-04 NOTE — CONSULTS
Patient Identification:  Clementina Betancur  89 y.o.  female  8/25/1927  [id number]          LOS 1    Requesting physician: Dr Levy    Reason for Consultation:  PNA and hypoxemia    History of Present Illness:   Thank you for this pulmonary consult.  89-year-old female with history of COPD, CHF and chronic kidney disease presents with worsening generalized weakness and confusion.  Has had some shortness of breath but no productive cough.  She does complain to me of a nonproductive cough that she denied on the initial evaluation by his admitting hospitalist.  She's been eating less lately with complaints of nausea and vomiting.  She has an abdominal binder for hiatal hernia she states.  She is really a poor historian and I'm not really able to get much else from her today.  All other history is per chart review.  She looks quite debilitated.  She denies any recent tobacco use.  I have reviewed her chest x-rays which show a left lower opacity.  CT chest is being performed.  She had recent significant C. difficile colitis.  She is on antibiotics for pneumonia and urinary tract infection.  Given the significance of her recent C. difficile colitis and requirement for antibiotics again, if CT does show opacity, would consider bronchoscopy to help narrow antibiotic coverage if patient and family would consent.    Past Medical History:  Past Medical History   Diagnosis Date   • Acute respiratory failure with hypoxia    • Arthritis    • Asthma    • Atrial fibrillation    • Cancer      multiple skincancers   • Candidal stomatitis    • CHF (congestive heart failure)    • Chronic kidney disease, stage 4, severely decreased GFR    • COPD (chronic obstructive pulmonary disease)    • Enterocolitis due to Clostridium difficile 12/29/2016     positive specimen collected on 12/29/2016   • History of falling    • History of transfusion      1989   • Hypertension    • Hypothyroidism    • Muscle weakness (generalized)    • Need  for assistance with personal care    • Renal disorder    • Weakness        Past Surgical History:  Past Surgical History   Procedure Laterality Date   • Hernia repair     • Colectomy partial / total Right 1989   • Hysterectomy     • Abdominal surgery     • Appendectomy     • Cholecystectomy Right    • Winter's neuroma excision Right    • Skin cancer excision     • Bronchoscopy N/A 1/12/2017     Procedure: BRONCHOSCOPY  WITH ANESTHESIA   with Bilateral  Lung  [place];  Surgeon: Lesetr Andino MD;  Location: [facility] ENDOSCOPY;  Service:         Home Meds:  Prescriptions Prior to Admission   Medication Sig Dispense Refill Last Dose   • acetaminophen-codeine (TYLENOL #3) 300-30 MG per tablet Take 0.5 tablets by mouth 3 (Three) Times a Day. 0.5 tablet in the morning and noon. 1 tablet at bed time. 12 tablet 0    • amiodarone (PACERONE) 200 MG tablet Take 200 mg by mouth 2 (Two) Times a Day.      • bumetanide (BUMEX) 1 MG tablet Take 1 mg by mouth Daily.      • Cholecalciferol (VITAMIN D3) 5000 UNITS capsule capsule Take 5,000 Units by mouth Daily.   12/26/2016   • diltiaZEM CD (CARDIZEM CD) 120 MG 24 hr capsule Take 1 capsule by mouth Daily.      • fluticasone (FLONASE) 50 MCG/ACT nasal spray 2 sprays into each nostril Daily.      • Glycerin-Hypromellose- (ARTIFICIAL TEARS) 0.2-0.2-1 % solution ophthalmic solution Administer 1 drop to both eyes Every 1 (One) Hour As Needed for dry eyes.  0    • hydrALAZINE (APRESOLINE) 25 MG tablet Take 3 tablets by mouth 3 (Three) Times a Day.      • ipratropium-albuterol (DUO-NEB) 0.5-2.5 mg/mL nebulizer Take 3 mL by nebulization Every 4 (Four) Hours As Needed for shortness of air. 360 mL     • levothyroxine (LEVOTHROID) 75 MCG tablet Take 75 mcg by mouth Daily.      • Multiple Vitamins-Minerals (CENTRUM SILVER PO) Take 1 tablet by mouth Daily.   12/26/2016 at Unknown time   • phenol (CHLORASEPTIC) 1.4 % liquid liquid Apply 2 sprays to the mouth or throat Every 2 (Two) Hours As  Needed (sore throat).  0    • potassium chloride (K-DUR) 10 MEQ CR tablet Take 10 mEq by mouth Daily.      • promethazine (PHENERGAN) 25 MG tablet Take 12.5 mg by mouth Every 4 (Four) Hours As Needed for nausea or vomiting.      • sodium chloride (OCEAN) 0.65 % nasal spray 2 sprays into each nostril As Needed for congestion.  12    • white petrolatum (VASELINE) gel gel Apply  topically Daily. Apply to upper lip topically      • acetaminophen (TYLENOL) 325 MG tablet Take 2 tablets by mouth Every 4 (Four) Hours As Needed for mild pain (1-3).  0    • ipratropium-albuterol (DUO-NEB) 0.5-2.5 mg/mL nebulizer Take 3 mL by nebulization 4 (Four) Times a Day. 360 mL     • levothyroxine (SYNTHROID, LEVOTHROID) 50 MCG tablet Take 50 mcg by mouth Daily.   12/26/2016 at Unknown time         Allergies:  Allergies   Allergen Reactions   • Augmentin [Amoxicillin-Pot Clavulanate]    • Ceftin [Cefuroxime Axetil]    • Ciprofloxacin    • Colchicine    • Garlic    • Hydrocodone    • Rabeprazole    • Tetracyclines & Related        Social History:   Social History     Social History   • Marital status:      Spouse name: N/A   • Number of children: N/A   • Years of education: N/A     Occupational History   • Not on file.     Social History Main Topics   • Smoking status: Never Smoker   • Smokeless tobacco: Not on file   • Alcohol use Yes      Comment: social   • Drug use: No   • Sexual activity: Defer     Other Topics Concern   • Not on file     Social History Narrative   • No narrative on file       Family History:  History reviewed. No pertinent family history.    Review of Systems:  Unable to obtain clear review of systems secondary to mental status change and confusion.  Positive for cough and shortness of breath.  Unable to obtain any other meaningful history.  Objective:    PHYSICAL EXAM:    Visit Vitals   • /46 (BP Location: Right arm, Patient Position: Lying)   • Pulse 60   • Temp 96.7 °F (35.9 °C) (Oral)   • Resp 18  "  • Ht 60\" (152.4 cm)   • Wt 118 lb (53.5 kg)   • SpO2 97%   • BMI 23.05 kg/m2    Body mass index is 23.05 kg/(m^2). 97% 118 lb (53.5 kg)    GENERAL APPEARANCE:   · Well developed  · Well nourished  · Confused  EYES:    · PERRL                                                                           · Conjunctiva normal  · Sclera non-icteric.  HENT:   · Atraumatic, normocephalic  · External ears and nose normal  · Moist mucus membranes and no ulcers  NECK:  · Thyroid not enlarged  · Trachea midline   RESPIRATORY:    · Nonlabored breathing   · Diminished breath sounds  · No wheezing.  Coarse breath sounds left lower lung field  · Symmetric chest movement  CARDIOVASCULAR:    · RRR  · Normal S1, S2  · No murmur  · Lower extremity edema: none    GI:   · Bowel sounds normal  · Abdomen soft , non-distended, non-tender  · No abdominal masses.    MUSCULOSKELETAL:  · Normal movement of extremities  · No tenderness, no deformities  · No clubbing or cyanosis   Skin:    · No visible rashes  · No palpable nodules  PSYCHIATRIC:  · Confused  NEUROLOGIC:      Lab Review:      Lab Results   Component Value Date    WBC 12.47 (H) 03/04/2017    HGB 10.2 (L) 03/04/2017    HCT 30.4 (L) 03/04/2017    MCV 91.3 03/04/2017     03/04/2017            Lab Results   Component Value Date    CREATININE 4.82 (H) 03/04/2017     (H) 03/04/2017     (L) 03/04/2017    K 5.0 03/04/2017    CL 94 (L) 03/04/2017    CO2 26.3 03/04/2017        Lab Results   Component Value Date    TROPONINT 0.085 (H) 03/04/2017              Imaging reviewed  chest X-ray shows left lower opacity       Assessment:  Sepsis  Left lower lobe pneumonia  COPD  Acute hypoxemia with respiratory failure  UTI  Recent severe C. difficile colitis  Acute kidney injury    Recommendations:  Await CT chest results.  If infiltrate is noted with consider bronchoscopy if family would consent for help with directing antibiotics.      Thank you for allowing me to participate " in the care of this patient.  I will continue to follow along with you.      Tucker Sim MD  [facility], [facility]  Pulmonary and Critical Care Medicine    3/4/2017  2:58 PM    EMR Dragon/Transcription disclaimer:     Much of this encounter note is an electronic transcription/translation of spoken language to printed text. The electronic translation of spoken language may permit erroneous, or at times, nonsensical words or phrases to be inadvertently transcribed; Although I have reviewed the note for such errors, some may still exist.

## 2017-03-05 NOTE — PROGRESS NOTES
[facility] INPATIENT PROGRESS NOTE         [address]    3/5/2017      PATIENT IDENTIFICATION:   Name:  Clementina Betancur      MRN:  [id number]     89 y.o.  female             LOS 2    Reason for visit: Follow-up sepsis with pneumonia and hypoxemic respiratory failure      SUBJECTIVE:    Complains of generally feeling bad.  Mild cough.  Denies current chest pain or nausea and vomiting.    Objective   OBJECTIVE:    Vital Sign Min/Max for last 24 hours  Temp  Min: 96.5 °F (35.8 °C)  Max: 98.5 °F (36.9 °C)   BP  Min: 122/55  Max: 145/59   Pulse  Min: 59  Max: 65   Resp  Min: 16  Max: 18   SpO2  Min: 89 %  Max: 97 %   Flow (L/min)  Min: 2  Max: 2   No Data Recorded                         Body mass index is 23.05 kg/(m^2).    Intake/Output Summary (Last 24 hours) at 03/05/17 1007  Last data filed at 03/05/17 0231   Gross per 24 hour   Intake   1180 ml   Output    650 ml   Net    530 ml         Exam:  GEN:  No distress, appears stated age  EYES:   PERRL, anicteric sclera  ENT:    External ears/nose normal, OP clear  NECK:  No adenopathy, midline trachea  LUNGS: Normal chest on inspection, palpation and diminished breath sounds bilaterally on auscultation  CV:  Normal S1S2, without murmur  ABD:  Non tender, non distended, no hepatosplenomegaly, +BS  EXT:  No edema, cyanosis or clubbing    Scheduled meds:    amiodarone 200 mg Oral BID   cefepime 1 g Intravenous Q24H   diltiaZEM  mg Oral Q24H   enoxaparin 30 mg Subcutaneous Daily   fluticasone 2 spray Nasal Daily   hydrALAZINE 75 mg Oral TID   levothyroxine 75 mcg Oral Q AM   phenazopyridine 100 mg Oral Every Other Day   saccharomyces boulardii 250 mg Oral BID     IV meds:                        sodium chloride 75 mL/hr Last Rate: 75 mL/hr (03/05/17 0231)     Data Review:    Results from last 7 days  Lab Units 03/05/17  0551 03/04/17  0612 03/03/17  1846   SODIUM mmol/L 136 135* 134*   POTASSIUM mmol/L 4.5 5.0 5.6*   CHLORIDE mmol/L 98 94* 90*    TOTAL CO2 mmol/L 25.0 26.3 29.0   BUN mg/dL 98* 106* 100*   CREATININE mg/dL 4.37* 4.82* 5.11*   GLUCOSE mg/dL 73 82 100*   CALCIUM mg/dL 11.7* 11.9* 13.8*         Estimated Creatinine Clearance: 6.3 mL/min (by C-G formula based on Cr of 4.37).    Results from last 7 days  Lab Units 03/05/17  0551 03/04/17  0612 03/03/17  1846   WBC 10*3/mm3 12.70* 12.47* 12.92*   HEMOGLOBIN g/dL 10.4* 10.2* 10.8*   PLATELETS 10*3/mm3 371 367 438       Results from last 7 days  Lab Units 03/04/17  0612   INR  1.01       Results from last 7 days  Lab Units 03/05/17  0551 03/04/17  0612 03/03/17  1846   ALT (SGPT) U/L 95* 69* 74*   AST (SGOT) U/L 106* 60* 60*       Microbiology reviewed : Greater than 100,000 colonies pseudomonas in urine         CT chest viewed:  Atypical bilateral infiltrates versus asymmetric vascular congestion.    Assessment   ASSESSMENT:   Sepsis  Left lower lobe pneumonia  COPD  Acute hypoxemia with respiratory failure  UTI: Pseudomonas in culture  Recent severe C. difficile colitis  Acute kidney injury      PLAN:  Antibiotics being managed by infectious disease.  If they feel it would change their management would consider bronchoscopy for lavage lung culture.  Unclear if infiltrate on chest x-rays infectious versus asymmetrical edema.  Encourage pulmonary toilet.    Tucker Sim MD  Pulmonary and Critical Care Medicine  [facility], [facility]  3/5/2017    10:07 AM

## 2017-03-05 NOTE — PROGRESS NOTES
"INFECTIOUS DISEASES PROGRESS NOTE    CC: f/u sepsis?    S:   She says \"I don't know\" to most questions  Still \"hurts all over\"  No f/c/ns    O:  Physical Exam:  Temp:  [96.7 °F (35.9 °C)-98.5 °F (36.9 °C)] 97.8 °F (36.6 °C)  Heart Rate:  [59-65] 61  Resp:  [16-18] 16  BP: (100-145)/(46-59) 131/58  Physical Exam   Constitutional:   Chronically ill appearing   Abdominal: Soft. She exhibits no distension. There is generalized tenderness.   Psychiatric:   Confused, not responding approrpriately        Diagnostics:    Cr 4.37  WBC 12.7  H/H 10.4/32    PCT 0.95  Vanc 21  ALT 91    PCT 0.77-->0.95      Bcx ngtd  RVP neg  Ucx Pseudomonas    CT chest, personally interpreted: diffuse bilateral infiltrates    Estimated Creatinine Clearance: 6.3 mL/min (by C-G formula based on Cr of 4.37).    Assessment/Plan   1. AHRF  2. LANCE  3. Sepsis?  4. H/o CDI    CT chest shows possible edema vs. Atypical infiltrates (could she have RV failure with edema and hepatic congestion?).  Ucx with pseudomonas.  We will cont on cefepime as dosed for LANCE.  I think she is at very high risk for C. Diff and will add florastor because I am desperate.      Miguel Demarco MD  8:03 AM  03/05/17         "

## 2017-03-05 NOTE — PROGRESS NOTES
"   LOS: 2 days    Patient Care Team:  Catrina Santiago MD as PCP - General (Internal Medicine)    Chief Complaint:  Weakness, Confusion    Subjective     Interval History:     Still c/o dry lips and mouth.  Denies CP or SOA.  No N/V/D.      Objective     Vital Sign Min/Max for last 24 hours  Temp  Min: 96.7 °F (35.9 °C)  Max: 98.5 °F (36.9 °C)   BP  Min: 100/46  Max: 145/59   Pulse  Min: 59  Max: 65   Resp  Min: 16  Max: 18   SpO2  Min: 89 %  Max: 98 %   Flow (L/min)  Min: 2  Max: 2   No Data Recorded     Flowsheet Rows         First Filed Value    Admission Height  60\" (152.4 cm) Documented at 03/03/2017 1756    Admission Weight  118 lb (53.5 kg) Documented at 03/03/2017 1756             I/O last 3 completed shifts:  In: 2740 [P.O.:290; I.V.:1950; IV Piggyback:500]  Out: 1200 [Urine:1200]    Physical Exam:     General Appearance:    Alert, cooperative, in no acute distress   Head:    Normocephalic, without obvious abnormality, atraumatic   Eyes:            Lids and lashes normal, conjunctivae and sclerae normal, no   icterus, no pallor, corneas clear, PERRLA   Ears:    Ears appear intact with no abnormalities noted   Throat:   No oral lesions, no thrush, oral mucosa moist   Neck:   No adenopathy, supple, trachea midline, no thyromegaly, no     carotid bruit, no JVD   Back:     No kyphosis present, no scoliosis present, no skin lesions,       erythema or scars, no tenderness to percussion or                   palpation,   range of motion normal   Lungs:     Clear to auscultation,respirations regular, even and                   unlabored    Heart:    Regular rhythm and normal rate, normal S1 and S2, no            murmur, no gallop, no rub, no click   Breast Exam:    Deferred   Abdomen:     Normal bowel sounds, no masses, no organomegaly, soft        non-tender, non-distended, no guarding, no rebound                 tenderness   Genitalia:    Deferred   Extremities:   Moves all extremities well, no edema, no cyanosis, " no              redness   Pulses:   Pulses palpable and equal bilaterally   Skin:   No bleeding, bruising or rash   Lymph nodes:   No palpable adenopathy   Neurologic:   Cranial nerves 2 - 12 grossly intact, sensation intact, DTR        present and equal bilaterally       WBC WBC   Date Value Ref Range Status   03/05/2017 12.70 (H) 4.50 - 10.70 10*3/mm3 Final   03/04/2017 12.47 (H) 4.50 - 10.70 10*3/mm3 Final   03/03/2017 12.92 (H) 4.50 - 10.70 10*3/mm3 Final      HGB HEMOGLOBIN   Date Value Ref Range Status   03/05/2017 10.4 (L) 11.9 - 15.5 g/dL Final   03/04/2017 10.2 (L) 11.9 - 15.5 g/dL Final   03/03/2017 10.8 (L) 11.9 - 15.5 g/dL Final      HCT HEMATOCRIT   Date Value Ref Range Status   03/05/2017 31.6 (L) 35.6 - 45.5 % Final   03/04/2017 30.4 (L) 35.6 - 45.5 % Final   03/03/2017 32.5 (L) 35.6 - 45.5 % Final      Platlets No results found for: LABPLAT   MCV MCV   Date Value Ref Range Status   03/05/2017 94.6 80.5 - 98.2 fL Final   03/04/2017 91.3 80.5 - 98.2 fL Final   03/03/2017 92.1 80.5 - 98.2 fL Final          Sodium SODIUM   Date Value Ref Range Status   03/05/2017 136 136 - 145 mmol/L Final   03/04/2017 135 (L) 136 - 145 mmol/L Final   03/03/2017 134 (L) 136 - 145 mmol/L Final      Potassium POTASSIUM   Date Value Ref Range Status   03/05/2017 4.5 3.5 - 5.2 mmol/L Final   03/04/2017 5.0 3.5 - 5.2 mmol/L Final   03/03/2017 5.6 (H) 3.5 - 5.2 mmol/L Final      Chloride CHLORIDE   Date Value Ref Range Status   03/05/2017 98 98 - 107 mmol/L Final   03/04/2017 94 (L) 98 - 107 mmol/L Final   03/03/2017 90 (L) 98 - 107 mmol/L Final      CO2 CO2   Date Value Ref Range Status   03/05/2017 25.0 22.0 - 29.0 mmol/L Final   03/04/2017 26.3 22.0 - 29.0 mmol/L Final   03/03/2017 29.0 22.0 - 29.0 mmol/L Final      BUN BUN   Date Value Ref Range Status   03/05/2017 98 (H) 8 - 23 mg/dL Final   03/04/2017 106 (H) 8 - 23 mg/dL Final   03/03/2017 100 (H) 8 - 23 mg/dL Final      Creatinine CREATININE   Date Value Ref Range  Status   03/05/2017 4.37 (H) 0.57 - 1.00 mg/dL Final   03/04/2017 4.82 (H) 0.57 - 1.00 mg/dL Final   03/03/2017 5.11 (H) 0.57 - 1.00 mg/dL Final      Calcium CALCIUM   Date Value Ref Range Status   03/05/2017 11.7 (H) 8.6 - 10.5 mg/dL Final   03/04/2017 11.9 (H) 8.6 - 10.5 mg/dL Final   03/03/2017 13.8 (C) 8.6 - 10.5 mg/dL Final      PO4 No results found for: CAPO4   Albumin ALBUMIN   Date Value Ref Range Status   03/05/2017 2.70 (L) 3.50 - 5.20 g/dL Final   03/04/2017 2.70 (L) 3.50 - 5.20 g/dL Final   03/03/2017 3.30 (L) 3.50 - 5.20 g/dL Final      Magnesium MAGNESIUM   Date Value Ref Range Status   03/05/2017 2.5 (H) 1.6 - 2.4 mg/dL Final   03/04/2017 2.7 (H) 1.6 - 2.4 mg/dL Final   03/03/2017 2.9 (H) 1.6 - 2.4 mg/dL Final      Uric Acid No results found for: URICACID        Results Review:     I reviewed the patient's new clinical results.      amiodarone 200 mg Oral BID   cefepime 1 g Intravenous Q24H   diltiaZEM  mg Oral Q24H   enoxaparin 30 mg Subcutaneous Daily   fluticasone 2 spray Nasal Daily   hydrALAZINE 75 mg Oral TID   levothyroxine 75 mcg Oral Q AM   phenazopyridine 100 mg Oral Every Other Day       sodium chloride 75 mL/hr Last Rate: 75 mL/hr (03/05/17 0231)       Medication Review: Reviewed    Assessment/Plan     Principal Problem:  Sepsis  Active Problems:  Atrial fibrillation  Left bundle branch block (LBBB)  Acute respiratory failure with hypoxia  COPD (chronic obstructive pulmonary disease)  UTI (urinary tract infection)  Acute on chronic renal failure  Pneumonia due to infectious organism  LANCE/CKDIV - Pre-renal/ATN from volume depletion, ?sepsis. Had been on diuretics and KCl as an outpatient.  Hypercalcemia  Hyperkalemia    PLAN: Cr remains quite elevated above baseline but slowly trending down.  No indication for acute HD.  Hypercalcemia slowly improving.  Continue gentle IVF today.  ABX per primary.      Jagjit Wood MD  03/05/17  8:01 AM

## 2017-03-05 NOTE — PROGRESS NOTES
"   LOS: 2 days   Patient Care Team:  Catrina Santiago MD as PCP - General (Internal Medicine)    Chief Complaint: \"pain all over\"    Subjective     HPI Comments: Pt still feels bad today. C/o pain all over. No SOA or CP.    Weakness - Generalized   Associated symptoms include weakness. Pertinent negatives include no anorexia, chest pain, coughing, fever, nausea or vomiting.   Female Dysuria    Pertinent negatives include no nausea or vomiting.       Subjective:  Symptoms:  Improved.  She reports malaise and weakness.  No shortness of breath, cough, chest pain, headache, chest pressure, anorexia, diarrhea or anxiety.    Diet:  Adequate intake.  No nausea or vomiting.    Activity level: Impaired due to weakness.    Pain:  She complains of pain that is mild.  She reports pain is unchanged.  Pain is well controlled.        History taken from: patient chart    Objective     Vital Signs  Temp:  [96.5 °F (35.8 °C)-98.5 °F (36.9 °C)] 96.5 °F (35.8 °C)  Heart Rate:  [59-65] 64  Resp:  [16-18] 18  BP: (122-145)/(52-59) 131/52    Objective:  General Appearance:  Comfortable and in no acute distress.    Vital signs: (most recent): Blood pressure 131/52, pulse 64, temperature 96.5 °F (35.8 °C), temperature source Oral, resp. rate 18, height 60\" (152.4 cm), weight 118 lb (53.5 kg), SpO2 97 %.  Vital signs are normal.  No fever.    Output: Producing urine and producing stool.    HEENT: Normal HEENT exam.    Lungs:  Normal respiratory rate and normal effort.  Breath sounds clear to auscultation.  (Anteriorly)  Heart: Normal rate.  Regular rhythm.    Abdomen: Abdomen is soft.  Bowel sounds are normal.   There is no abdominal tenderness.     Extremities: There is no dependent edema.    Pulses: Distal pulses are intact.    Neurological: Patient is alert and oriented to person, place and time.    Skin:  Warm and dry.              Results Review:     I reviewed the patient's new clinical results.  I reviewed the patient's new imaging " results and agree with the interpretation.  I reviewed the patient's other test results and agree with the interpretation  Discussed with patient    Medication Review: reviewed    Assessment/Plan     Principal Problem:    Sepsis  Active Problems:    Atrial fibrillation    Left bundle branch block (LBBB)    Acute respiratory failure with hypoxia    COPD (chronic obstructive pulmonary disease)    UTI (urinary tract infection)    Acute on chronic renal failure    Pneumonia due to infectious organism      Assessment:  (1. Sepsis due to #2 & #3  2. Bilateral HCAP  3. UTI (Pseudomonas)  4. Acute hypoxic resp failure  5. LANCE/CKD4  6. Recent CDiff colitis  7. HyperK+/Ca++/Mg++  8. Elevated Trop  9. LBBB  10. Afib (no AC due to falls)  11. COPD  12. HTN  13. HypoT4).     Plan:   (Appreciate Pulm/Renal/ID attention to patient  Continue Cefepime  Monitor Cr, currently on IVFs per Renal--watch for volume overload/pulm edema  Certainly at high risk for recurrence of CDiff, but no other options at present but to treat her significant PNA).       Roscoe Levy MD  03/05/17  10:03 AM    Time: 25min

## 2017-03-05 NOTE — PLAN OF CARE
Problem: Patient Care Overview (Adult)  Goal: Plan of Care Review  Outcome: Ongoing (interventions implemented as appropriate)    03/04/17 1952 03/05/17 0036 03/05/17 1731   Coping/Psychosocial Response Interventions   Plan Of Care Reviewed With patient --  --    Patient Care Overview   Progress --  no change --    Outcome Evaluation   Outcome Summary/Follow up Plan --  --  placed vergara today related to urine retention        Goal: Adult Individualization and Mutuality  Outcome: Ongoing (interventions implemented as appropriate)  Goal: Discharge Needs Assessment  Outcome: Ongoing (interventions implemented as appropriate)    03/03/17 2255 03/05/17 0036   Discharge Needs Assessment   Concerns To Be Addressed --  basic needs concerns   Readmission Within The Last 30 Days --  no previous admission in last 30 days   Discharge Disposition --  still a patient   Current Health   Anticipated Changes Related to Illness --  inability to care for self   Living Environment   Transportation Available --  car;family or friend will provide   Self-Care   Equipment Currently Used at Home cane, straight;bath bench;walker, standard --          Problem: Renal Failure/Kidney Injury, Acute (Adult)  Goal: Signs and Symptoms of Listed Potential Problems Will be Absent or Manageable (Renal Failure/Kidney Injury, Acute)  Outcome: Ongoing (interventions implemented as appropriate)    03/05/17 0036   Renal Failure/Kidney Injury, Acute   Problems Assessed (Acute Renal Failure/Kidney Injury) all   Problems Present (Acute Renal Failure/Kidney Injury) electrolyte imbalance;hypertension;situational response

## 2017-03-05 NOTE — PLAN OF CARE
Problem: Patient Care Overview (Adult)  Goal: Plan of Care Review  Outcome: Ongoing (interventions implemented as appropriate)    03/04/17 1952 03/05/17 0036   Coping/Psychosocial Response Interventions   Plan Of Care Reviewed With patient --    Patient Care Overview   Progress --  no change       Goal: Discharge Needs Assessment  Outcome: Ongoing (interventions implemented as appropriate)    03/03/17 2255 03/05/17 0036   Discharge Needs Assessment   Concerns To Be Addressed --  basic needs concerns   Readmission Within The Last 30 Days --  no previous admission in last 30 days   Equipment Needed After Discharge --  none   Discharge Disposition --  still a patient   Current Health   Anticipated Changes Related to Illness --  inability to care for self   Living Environment   Transportation Available --  car;family or friend will provide   Self-Care   Equipment Currently Used at Home cane, straight;bath bench;walker, standard --          Problem: Renal Failure/Kidney Injury, Acute (Adult)  Intervention: Prevent/Manage Infection    03/05/17 0036   Safety Interventions   Infection Prevention environmental surveillance performed   Safety Interventions   Infection Management aseptic technique maintained   Pain/Comfort/Sleep Interventions   Sleep/Rest Enhancement awakenings minimized;relaxation techniques promoted   Hygiene Care Assistance   Oral Care lip lubricant applied       Intervention: Monitor/Manage Fluid, Acid Base Balance    03/04/17 1952 03/05/17 0019 03/05/17 0036   Safety Interventions   Medication Review/Management medications reviewed --  --    Positioning   Positioning --  side lying, left --    Nutrition Interventions   Fluid/Electrolyte Management --  --  fluids provided;fluids adjusted       Intervention: Evaluate/Maintain Nutrition Support    03/05/17 0036   Hygiene Care Assistance   Oral Care lip lubricant applied   Coping/Psychosocial Interventions   Environmental Support calm environment promoted        Intervention: Monitor/Manage Anemia/Signs of Bleeding    03/05/17 0036   Safety Interventions   Bleeding Precautions blood pressure closely monitored       Intervention: Promote Energy Conservation    03/05/17 0036   Pain/Comfort/Sleep Interventions   Sleep/Rest Enhancement awakenings minimized;relaxation techniques promoted   Coping/Psychosocial Interventions   Environmental Support calm environment promoted       Intervention: Provide Oxygenation/Ventilation/Perfusion Support    03/04/17 1952 03/04/17 2314 03/05/17 0019   Safety Interventions   Medication Review/Management medications reviewed --  --    Positioning   Head Of Bed (HOB) Position --  --  HOB at 20-30 degrees   Activity   Activity Type --  bedrest --          Goal: Signs and Symptoms of Listed Potential Problems Will be Absent or Manageable (Renal Failure/Kidney Injury, Acute)  Outcome: Ongoing (interventions implemented as appropriate)    03/05/17 0036   Renal Failure/Kidney Injury, Acute   Problems Assessed (Acute Renal Failure/Kidney Injury) all   Problems Present (Acute Renal Failure/Kidney Injury) electrolyte imbalance;hypertension;situational response

## 2017-03-06 NOTE — PLAN OF CARE
Problem: Patient Care Overview (Adult)  Goal: Plan of Care Review  Outcome: Ongoing (interventions implemented as appropriate)    03/06/17 0441   Coping/Psychosocial Response Interventions   Plan Of Care Reviewed With patient   Patient Care Overview   Progress improving   Outcome Evaluation   Outcome Summary/Follow up Plan pt resting in bed. c/o generalized pain. medicated prn. f/c to bedside drainage. urine clear yellow. Abdominal bider in place for umbilical hernia. Will continue to monitor.        Goal: Adult Individualization and Mutuality  Outcome: Ongoing (interventions implemented as appropriate)  Goal: Discharge Needs Assessment  Outcome: Ongoing (interventions implemented as appropriate)    Problem: Renal Failure/Kidney Injury, Acute (Adult)  Goal: Signs and Symptoms of Listed Potential Problems Will be Absent or Manageable (Renal Failure/Kidney Injury, Acute)  Outcome: Ongoing (interventions implemented as appropriate)    Problem: Pain, Acute (Adult)  Goal: Identify Related Risk Factors and Signs and Symptoms  Outcome: Outcome(s) achieved Date Met:  03/06/17  Goal: Acceptable Pain Control/Comfort Level  Outcome: Ongoing (interventions implemented as appropriate)    Problem: Fall Risk (Adult)  Goal: Identify Related Risk Factors and Signs and Symptoms  Outcome: Outcome(s) achieved Date Met:  03/06/17  Goal: Absence of Falls  Outcome: Ongoing (interventions implemented as appropriate)

## 2017-03-06 NOTE — PROGRESS NOTES
"   LOS: 3 days   Patient Care Team:  Catrina Santiago MD as PCP - General (Internal Medicine)    Chief Complaint/ Reason for encounter: Acute renal failure, hypercalcemia  Chief Complaint   Patient presents with   • Weakness - Generalized   • Female Dysuria         Subjective     History of Present Illness    Subjective:  Symptoms:  Worsening.  She reports malaise and weakness.  No shortness of breath or chest pain.    Diet:  Poor intake.  No nausea.    Activity level: Impaired due to weakness.    Pain:  She reports no pain.          History taken from: Patient and chart    Objective     Vital Signs  Temp:  [97.2 °F (36.2 °C)-98.1 °F (36.7 °C)] 98.1 °F (36.7 °C)  Heart Rate:  [59-73] 69  Resp:  [18] 18  BP: ()/(60-87) 149/60       Wt Readings from Last 1 Encounters:   03/06/17 0600 120 lb (54.4 kg)   03/03/17 1756 118 lb (53.5 kg)       Objective:  General Appearance:  Ill-appearing, uncomfortable and in no acute distress.    Vital signs: (most recent): Blood pressure 149/60, pulse 69, temperature 98.1 °F (36.7 °C), temperature source Oral, resp. rate 18, height 60\" (152.4 cm), weight 120 lb (54.4 kg), SpO2 97 %.  Vital signs are normal.  No fever.    Output: Producing urine.    HEENT: Normal HEENT exam.    Lungs:  Normal respiratory rate.  She is not in respiratory distress.  There are decreased breath sounds.  No wheezes.    Heart: Normal rate.  Regular rhythm.  No murmur.   Abdomen: Abdomen is soft and non-distended.  Bowel sounds are normal.   There is no abdominal tenderness.  There is no epigastric area or no suprapubic area tenderness.     Extremities: Normal range of motion.  There is no deformity or dependent edema.    Pulses: Distal pulses are intact.    Neurological: Patient is alert and oriented to person, place and time.    Skin:  Warm and dry.  No rash or cyanosis.             Results Review:    Past Medical History: reviewed and updated  Past Medical History   Diagnosis Date   • Acute respiratory " failure with hypoxia    • Arthritis    • Asthma    • Atrial fibrillation    • Cancer      multiple skincancers   • Candidal stomatitis    • CHF (congestive heart failure)    • Chronic kidney disease, stage 4, severely decreased GFR    • COPD (chronic obstructive pulmonary disease)    • Enterocolitis due to Clostridium difficile 12/29/2016     positive specimen collected on 12/29/2016   • History of falling    • History of transfusion      1989   • Hypertension    • Hypothyroidism    • Muscle weakness (generalized)    • Need for assistance with personal care    • Renal disorder    • Weakness          Allergies:  Allergies   Allergen Reactions   • Augmentin [Amoxicillin-Pot Clavulanate]    • Ceftin [Cefuroxime Axetil]    • Ciprofloxacin    • Colchicine    • Garlic    • Hydrocodone    • Rabeprazole    • Tetracyclines & Related        Intake/Output:     Intake/Output Summary (Last 24 hours) at 03/06/17 1103  Last data filed at 03/06/17 0607   Gross per 24 hour   Intake    600 ml   Output   1750 ml   Net  -1150 ml         DATA:  Interval chart, labs and notes reviewed.    Labs:   Recent Results (from the past 24 hour(s))   Basic Metabolic Panel    Collection Time: 03/06/17  8:02 AM   Result Value Ref Range    Glucose 71 65 - 99 mg/dL    BUN 86 (H) 8 - 23 mg/dL    Creatinine 3.62 (H) 0.57 - 1.00 mg/dL    Sodium 138 136 - 145 mmol/L    Potassium 4.7 3.5 - 5.2 mmol/L    Chloride 99 98 - 107 mmol/L    CO2 25.8 22.0 - 29.0 mmol/L    Calcium 12.0 (H) 8.6 - 10.5 mg/dL    eGFR Non African Amer 12 (L) >60 mL/min/1.73    BUN/Creatinine Ratio 23.8 7.0 - 25.0    Anion Gap 13.2 mmol/L   CBC (No Diff)    Collection Time: 03/06/17  8:02 AM   Result Value Ref Range    WBC 11.58 (H) 4.50 - 10.70 10*3/mm3    RBC 3.30 (L) 3.90 - 5.20 10*6/mm3    Hemoglobin 10.1 (L) 11.9 - 15.5 g/dL    Hematocrit 30.7 (L) 35.6 - 45.5 %    MCV 93.0 80.5 - 98.2 fL    MCH 30.6 26.9 - 32.0 pg    MCHC 32.9 32.4 - 36.3 g/dL    RDW 15.0 (H) 11.7 - 13.0 %    RDW-SD  51.3 37.0 - 54.0 fl    MPV 10.8 6.0 - 12.0 fL    Platelets 366 140 - 500 10*3/mm3       Radiology: Renal ultrasound personally reviewed, no obstruction, small kidneys consistent with advanced chronic kidney disease  Old records personally reviewed, baseline creatinine 2.1, stage IV chronic kidney disease  The above labs personally reviewed by me       Medications have been reviewed:  Current Facility-Administered Medications   Medication Dose Route Frequency Provider Last Rate Last Dose   • acetaminophen (TYLENOL) tablet 650 mg  650 mg Oral Q4H PRN Olekasndr Keyes MD       • amiodarone (PACERONE) tablet 200 mg  200 mg Oral BID Oleksandr Keyes MD   200 mg at 03/06/17 0933   • bumetanide (BUMEX) injection 1 mg  1 mg Intravenous BID Filipe Feldman MD       • cefepime (MAXIPIME) 1 g/100 mL 0.9% NS IVPB (mbp)  1 g Intravenous Q24H Miguel Demarco MD   1 g at 03/05/17 1715   • diatrizoate meglumine-sodium (GASTROGRAFIN) 66-10 % solution 30 mL  30 mL Oral Once Miguel Demarco MD       • diltiaZEM CD (CARDIZEM CD) 24 hr capsule 120 mg  120 mg Oral Q24H Oleksandr Keyes MD   120 mg at 03/06/17 0933   • enoxaparin (LOVENOX) syringe 30 mg  30 mg Subcutaneous Daily Oleksandr Keyes MD   30 mg at 03/06/17 0934   • fluticasone (FLONASE) 50 MCG/ACT nasal spray 2 spray  2 spray Nasal Daily Oleksandr Keyes MD   2 spray at 03/06/17 0935   • Glycerin-Hypromellose- (ARTIFICIAL TEARS) 0.2-0.2-1 % ophthalmic solution solution 1 drop  1 drop Both Eyes Q1H PRN Oleksandr Keyes MD       • hydrALAZINE (APRESOLINE) tablet 75 mg  75 mg Oral TID Oleksandr Keyes MD   75 mg at 03/06/17 0933   • HYDROmorphone (DILAUDID) injection 0.5 mg  0.5 mg Intravenous Q2H PRN Oleksandr Keyes MD   0.5 mg at 03/06/17 0247    And   • naloxone (NARCAN) injection 0.4 mg  0.4 mg Intravenous Q5 Min PRN Oleksandr Keyes MD       • ipratropium-albuterol (DUO-NEB) nebulizer solution 3 mL  3 mL Nebulization Q4H PRN  Oleksandr Keyes MD       • levothyroxine (SYNTHROID, LEVOTHROID) tablet 75 mcg  75 mcg Oral Q AM Oleksandr Keyes MD   75 mcg at 03/06/17 0935   • nitroglycerin (NITROSTAT) SL tablet 0.4 mg  0.4 mg Sublingual Q5 Min PRN Oleksandr Keyes MD       • ondansetron (ZOFRAN) tablet 4 mg  4 mg Oral Q6H PRN Oleksandr Keyes MD        Or   • ondansetron ODT (ZOFRAN-ODT) disintegrating tablet 4 mg  4 mg Oral Q6H PRN Oleksandr Keyes MD        Or   • ondansetron (ZOFRAN) injection 4 mg  4 mg Intravenous Q6H PRN Oleksandr Keyes MD   4 mg at 03/06/17 0948   • oxyCODONE-acetaminophen (PERCOCET) 5-325 MG per tablet 1 tablet  1 tablet Oral Q4H PRN Oleksandr Keyes MD   1 tablet at 03/06/17 0934   • phenazopyridine (PYRIDIUM) tablet 100 mg  100 mg Oral Every Other Day Jocelyn Khan MD   100 mg at 03/06/17 0930   • saccharomyces boulardii (FLORASTOR) capsule 250 mg  250 mg Oral BID Miguel Demarco MD   250 mg at 03/06/17 0933   • sennosides-docusate sodium (SENOKOT-S) 8.6-50 MG tablet 2 tablet  2 tablet Oral Nightly PRN Oleksandr Keyes MD       • sodium chloride 0.9 % flush 1-10 mL  1-10 mL Intravenous PRN Oleksandr Keyes MD       • sodium chloride 0.9 % flush 10 mL  10 mL Intravenous PRN Sanju Sharma MD       • sodium chloride 0.9 % infusion  125 mL/hr Intravenous Continuous Filipe Feldman MD 75 mL/hr at 03/05/17 0231 75 mL/hr at 03/05/17 0231       Assessment/Plan     Principal Problem:    Sepsis  Active Problems:    Atrial fibrillation    Left bundle branch block (LBBB)    Acute respiratory failure with hypoxia    COPD (chronic obstructive pulmonary disease)    UTI (urinary tract infection)    Acute on chronic renal failure    Pneumonia due to infectious organism      Assessment:  (Acute renal failure, improved with IV fluids  New, Hypercalcemia, no clear etiology, had been improving but stable today  Chronic kidney disease stage IV and dehydration  Urinary tract  infection  Pneumonia, on IV antibiotics, sepsis syndrome improving  COPD  Acute hypoxemic respiratory failure    Plan:  Renal function improved but calcium levels have stabilized  Will increase IV fluids and add scheduled IV Bumex to assist with urinary calcium excretion  Prefer to avoid Pamidronate or related medications as I feel this would cause too much of a calcium drop  Recheck ionized calcium level in the morning).             Continue to monitor renal function, electroytes and volume closely   Please call me with any questions or concerns      Filipe Feldman MD   [facility]   [phone]    03/06/17  11:03 AM        EMR Dragon/Transcription disclaimer:    Much of this encounter note is an electronic transcription/translation of spoken language to printed text. The electronic translation of spoken language may permit erroneous, or at times, nonsensical words or phrases to be inadvertently transcribed; Although I have reviewed the note for such errors, some may still exist.

## 2017-03-06 NOTE — PROGRESS NOTES
[facility] INPATIENT PROGRESS NOTE         [address]    3/6/2017      PATIENT IDENTIFICATION:   Name:  Clementina Betancur      MRN:  [id number]     89 y.o.  female             LOS 3    Reason for visit: Follow-up sepsis with pneumonia and hypoxemic respiratory failure      SUBJECTIVE:    Mild cough.  Denies current chest pain or nausea and vomiting.    Objective   OBJECTIVE:    Vital Sign Min/Max for last 24 hours  Temp  Min: 97.6 °F (36.4 °C)  Max: 98.1 °F (36.7 °C)   BP  Min: 113/87  Max: 149/60   Pulse  Min: 59  Max: 73   Resp  Min: 18  Max: 18   SpO2  Min: 95 %  Max: 98 %   Flow (L/min)  Min: 2  Max: 2   Weight  Min: 120 lb (54.4 kg)  Max: 120 lb (54.4 kg)                         Body mass index is 23.44 kg/(m^2).    Intake/Output Summary (Last 24 hours) at 03/06/17 1615  Last data filed at 03/06/17 1313   Gross per 24 hour   Intake    240 ml   Output   1600 ml   Net  -1360 ml         Exam:  GEN:  No distress, appears stated age  EYES:   PERRL, anicteric sclera  ENT:    External ears/nose normal, OP clear  NECK:  No adenopathy, midline trachea  LUNGS: Normal chest on inspection, palpation and diminished breath sounds bilaterally on auscultation  CV:  Normal S1S2, without murmur  ABD:  Non tender, non distended, no hepatosplenomegaly, +BS  EXT:  No edema, cyanosis or clubbing    Scheduled meds:      amiodarone 200 mg Oral BID   bumetanide 1 mg Intravenous BID   cefepime 1 g Intravenous Q24H   diltiaZEM  mg Oral Q24H   enoxaparin 30 mg Subcutaneous Daily   fluticasone 2 spray Nasal Daily   hydrALAZINE 75 mg Oral TID   levothyroxine 75 mcg Oral Q AM   phenazopyridine 100 mg Oral Every Other Day   saccharomyces boulardii 250 mg Oral BID     IV meds:                          sodium chloride 125 mL/hr Last Rate: 125 mL/hr (03/06/17 1305)     Data Review:    Results from last 7 days  Lab Units 03/06/17  0802 03/05/17  0551 03/04/17  0612 03/03/17  1846   SODIUM mmol/L 138 136 135* 134*    POTASSIUM mmol/L 4.7 4.5 5.0 5.6*   CHLORIDE mmol/L 99 98 94* 90*   TOTAL CO2 mmol/L 25.8 25.0 26.3 29.0   BUN mg/dL 86* 98* 106* 100*   CREATININE mg/dL 3.62* 4.37* 4.82* 5.11*   GLUCOSE mg/dL 71 73 82 100*   CALCIUM mg/dL 12.0* 11.7* 11.9* 13.8*         Estimated Creatinine Clearance: 7.6 mL/min (by C-G formula based on Cr of 3.62).    Results from last 7 days  Lab Units 03/06/17  0802 03/05/17  0551 03/04/17  0612 03/03/17  1846   WBC 10*3/mm3 11.58* 12.70* 12.47* 12.92*   HEMOGLOBIN g/dL 10.1* 10.4* 10.2* 10.8*   PLATELETS 10*3/mm3 366 371 367 438       Results from last 7 days  Lab Units 03/04/17  0612   INR  1.01       Results from last 7 days  Lab Units 03/05/17  0551 03/04/17  0612 03/03/17  1846   ALT (SGPT) U/L 95* 69* 74*   AST (SGOT) U/L 106* 60* 60*       Microbiology reviewed : Greater than 100,000 colonies pseudomonas in urine         CT chest viewed:  Atypical bilateral infiltrates versus asymmetric vascular congestion.    Assessment   ASSESSMENT:   Sepsis  Left lower lobe pneumonia  COPD  Acute hypoxemia with respiratory failure  UTI: Pseudomonas in culture  Recent severe C. difficile colitis  Acute kidney injury      PLAN:  Antibiotics being managed by infectious disease.  Encourage pulmonary toilet.  CT abdomen/pelvis pending.    Tucker Sim MD  Pulmonary and Critical Care Medicine  [facility], [facility]  3/6/2017    4:15 PM

## 2017-03-06 NOTE — PROGRESS NOTES
INFECTIOUS DISEASES PROGRESS NOTE    CC: f/u sepsis    S:   She complains of abdominal pain  No diarrhea  No f/c/ns  Says she has been throwing up    O:  Physical Exam:  Temp:  [97.2 °F (36.2 °C)-98.1 °F (36.7 °C)] 98.1 °F (36.7 °C)  Heart Rate:  [59-73] 69  Resp:  [18] 18  BP: ()/(60-87) 149/60  Physical Exam   Constitutional: She appears ill (chronically).   Pulmonary/Chest: Effort normal.   Abdominal: Soft. She exhibits distension. There is generalized tenderness.   Neurological: She is alert.        Diagnostics:     Cr 3.62  WBC 11.58      Bcx ngtd  RVP neg  Ucx Pseudomonas (R-lvq, cipro)  MRSA screen negative       Estimated Creatinine Clearance: 7.6 mL/min (by C-G formula based on Cr of 3.62).    Assessment/Plan   1. AHRF  2. LANCE  3. Sepsis?  4. H/o CDI  5. Pseudomonas uti    We will check CT a/p to evaluate abdominal pain, nausea and LFT abberations.  Cont cefepime but if CT is negative will d/c as already has received sufficient course for pseudomonal cystitis and I remain very worried about c. Diff.    Miguel Demarco MD  11:00 AM  03/06/17

## 2017-03-06 NOTE — PROGRESS NOTES
"   LOS: 3 days   Patient Care Team:  Catrina Santiago MD as PCP - General (Internal Medicine)    Chief Complaint: abd pain    Subjective     HPI Comments: Pt c/o abd pain and nausea. No vomiting. Currently she is telling me she's hungry.    Weakness - Generalized   Associated symptoms include weakness. Pertinent negatives include no anorexia, chest pain, coughing, fever, nausea or vomiting.   Female Dysuria    Pertinent negatives include no nausea or vomiting.       Subjective:  Symptoms:  Improved.  She reports malaise and weakness.  No shortness of breath, cough, chest pain, headache, chest pressure, anorexia, diarrhea or anxiety.    Diet:  Adequate intake.  No nausea or vomiting.    Activity level: Impaired due to weakness.    Pain:  She complains of pain that is mild.  She reports pain is unchanged.  Pain is well controlled.        History taken from: patient chart RN    Objective     Vital Signs  Temp:  [97.2 °F (36.2 °C)-98.1 °F (36.7 °C)] 98.1 °F (36.7 °C)  Heart Rate:  [59-73] 69  Resp:  [18] 18  BP: ()/(60-87) 149/60    Objective:  General Appearance:  Comfortable and in no acute distress.    Vital signs: (most recent): Blood pressure 149/60, pulse 69, temperature 98.1 °F (36.7 °C), temperature source Oral, resp. rate 18, height 60\" (152.4 cm), weight 120 lb (54.4 kg), SpO2 97 %.  Vital signs are normal.  No fever.    Output: Producing urine and producing stool.    HEENT: Normal HEENT exam.    Lungs:  Normal respiratory rate and normal effort.  Breath sounds clear to auscultation.  (Anteriorly)  Heart: Normal rate.  Regular rhythm.    Abdomen: Abdomen is soft.  Bowel sounds are normal.   There is no abdominal tenderness.     Extremities: There is no dependent edema.    Pulses: Distal pulses are intact.    Neurological: Patient is alert and oriented to person, place and time.    Skin:  Warm and dry.              Results Review:     I reviewed the patient's new clinical results.  I reviewed the patient's " other test results and agree with the interpretation  Discussed with patient and RN    Medication Review: reviewed    Assessment/Plan     Principal Problem:    Sepsis  Active Problems:    Atrial fibrillation    Left bundle branch block (LBBB)    Acute respiratory failure with hypoxia    COPD (chronic obstructive pulmonary disease)    UTI (urinary tract infection)    Acute on chronic renal failure    Pneumonia due to infectious organism      Assessment:  (1. Sepsis due to #2 & #3  2. Bilateral HCAP vs Pulm Edema  3. UTI (Pseudomonas)  4. Acute hypoxic resp failure  5. LANCE/CKD4  6. Recent CDiff colitis  7. HyperK+/Ca++/Mg++  8. Elevated Trop  9. LBBB  10. Afib (no AC due to falls)  11. COPD  12. HTN  13. HypoT4).     Plan:   (Appreciate Pulm/Renal/ID attention to patient  Continue Cefepime for now  Monitor Cr, currently on IVFs per Renal--watch for volume overload/pulm edema  IV Bumex started to help with urinary calcium excretion  Certainly at high risk for recurrence of CDiff, but no other options at present but to treat her UTI. Some question per Pulm as to whether she has PNA or merely asymmetric pulmonary edema.  CT A&P ordered to evaluate abd pain/N/V and elevated LFTs.).       Roscoe Levy MD  03/06/17  11:53 AM    Time: 20min

## 2017-03-07 PROBLEM — K85.00 IDIOPATHIC ACUTE PANCREATITIS: Status: ACTIVE | Noted: 2017-01-01

## 2017-03-07 NOTE — PROGRESS NOTES
"   LOS: 4 days   Patient Care Team:  Catrina Santiago MD as PCP - General (Internal Medicine)    Chief Complaint/ Reason for encounter: Acute renal failure, hypercalcemia  Chief Complaint   Patient presents with   • Weakness - Generalized   • Female Dysuria         Subjective     Weakness - Generalized   Associated symptoms include weakness. Pertinent negatives include no chest pain, fever or nausea.   Female Dysuria    Pertinent negatives include no nausea.       Subjective:  Symptoms:  Stable.  She reports malaise and weakness.  No shortness of breath or chest pain.  (No new complaints, resting).    Diet:  Poor intake.  No nausea.    Activity level: Impaired due to weakness.    Pain:  She complains of pain that is mild.  She reports pain is unchanged.  Pain is partially controlled.          History taken from: Patient and chart    Objective     Vital Signs  Temp:  [97.3 °F (36.3 °C)-97.8 °F (36.6 °C)] 97.6 °F (36.4 °C)  Heart Rate:  [58-65] 62  Resp:  [18] 18  BP: (109-149)/(51-68) 117/68       Wt Readings from Last 1 Encounters:   03/07/17 0500 146 lb (66.2 kg)   03/06/17 0600 120 lb (54.4 kg)   03/03/17 1756 118 lb (53.5 kg)       Objective:  General Appearance:  Ill-appearing and in no acute distress (Chronically ill-appearing).    Vital signs: (most recent): Blood pressure 117/68, pulse 62, temperature 97.6 °F (36.4 °C), temperature source Oral, resp. rate 18, height 60\" (152.4 cm), weight 146 lb (66.2 kg), SpO2 97 %.  Vital signs are normal.  No fever.    Output: Producing urine.    HEENT: Normal HEENT exam.    Lungs:  Normal respiratory rate.  She is not in respiratory distress.  There are decreased breath sounds.  No wheezes.    Heart: Normal rate.  Regular rhythm.  No murmur.   Abdomen: Abdomen is soft and non-distended.  Bowel sounds are normal.   There is no abdominal tenderness.  There is no epigastric area or no suprapubic area tenderness.     Extremities: Normal range of motion.  There is no deformity " or dependent edema.    Pulses: Distal pulses are intact.    Neurological: Patient is alert and oriented to person, place and time.    Skin:  Warm and dry.  No rash or cyanosis.             Results Review:    Past Medical History: reviewed and updated  Past Medical History   Diagnosis Date   • Acute respiratory failure with hypoxia    • Arthritis    • Asthma    • Atrial fibrillation    • Cancer      multiple skincancers   • Candidal stomatitis    • CHF (congestive heart failure)    • Chronic kidney disease, stage 4, severely decreased GFR    • COPD (chronic obstructive pulmonary disease)    • Enterocolitis due to Clostridium difficile 12/29/2016     positive specimen collected on 12/29/2016   • History of falling    • History of transfusion      1989   • Hypertension    • Hypothyroidism    • Muscle weakness (generalized)    • Need for assistance with personal care    • Renal disorder    • Weakness          Allergies:  Allergies   Allergen Reactions   • Augmentin [Amoxicillin-Pot Clavulanate]    • Ceftin [Cefuroxime Axetil]    • Ciprofloxacin    • Colchicine    • Garlic    • Hydrocodone    • Rabeprazole    • Tetracyclines & Related        Intake/Output:     Intake/Output Summary (Last 24 hours) at 03/07/17 1547  Last data filed at 03/07/17 1021   Gross per 24 hour   Intake    240 ml   Output    300 ml   Net    -60 ml         DATA:  Interval chart, labs and notes reviewed.    Labs:   Recent Results (from the past 24 hour(s))   Calcium, Ionized    Collection Time: 03/07/17  6:00 AM   Result Value Ref Range    Ionized Calcium 1.74 (H) 1.10 - 1.35 mmol/L    Ionized Calcium 7.0 (H) 4.6 - 5.4 mg/dL   Hepatic Function Panel    Collection Time: 03/07/17  6:00 AM   Result Value Ref Range    Total Protein 5.3 (L) 6.0 - 8.5 g/dL    Albumin 2.60 (L) 3.50 - 5.20 g/dL    ALT (SGPT) 100 (H) 1 - 33 U/L    AST (SGOT) 94 (H) 1 - 32 U/L    Alkaline Phosphatase 249 (H) 39 - 117 U/L    Total Bilirubin 0.2 0.1 - 1.2 mg/dL    Bilirubin,  Direct <0.2 0.0 - 0.3 mg/dL    Bilirubin, Indirect  mg/dL   CBC (No Diff)    Collection Time: 03/07/17  6:00 AM   Result Value Ref Range    WBC 14.35 (H) 4.50 - 10.70 10*3/mm3    RBC 3.12 (L) 3.90 - 5.20 10*6/mm3    Hemoglobin 9.4 (L) 11.9 - 15.5 g/dL    Hematocrit 29.1 (L) 35.6 - 45.5 %    MCV 93.3 80.5 - 98.2 fL    MCH 30.1 26.9 - 32.0 pg    MCHC 32.3 (L) 32.4 - 36.3 g/dL    RDW 15.3 (H) 11.7 - 13.0 %    RDW-SD 52.0 37.0 - 54.0 fl    MPV 10.6 6.0 - 12.0 fL    Platelets 348 140 - 500 10*3/mm3   Lipase    Collection Time: 03/07/17  6:00 AM   Result Value Ref Range    Lipase 534 (H) 13 - 60 U/L   Amylase    Collection Time: 03/07/17  6:00 AM   Result Value Ref Range    Amylase 134 (H) 28 - 100 U/L   Phosphorus    Collection Time: 03/07/17  6:00 AM   Result Value Ref Range    Phosphorus 5.8 (H) 2.5 - 4.5 mg/dL   Basic Metabolic Panel    Collection Time: 03/07/17  6:00 AM   Result Value Ref Range    Glucose 81 65 - 99 mg/dL    BUN 83 (H) 8 - 23 mg/dL    Creatinine 3.43 (H) 0.57 - 1.00 mg/dL    Sodium 138 136 - 145 mmol/L    Potassium 4.9 3.5 - 5.2 mmol/L    Chloride 100 98 - 107 mmol/L    CO2 21.5 (L) 22.0 - 29.0 mmol/L    Calcium 11.9 (H) 8.6 - 10.5 mg/dL    eGFR Non African Amer 13 (L) >60 mL/min/1.73    BUN/Creatinine Ratio 24.2 7.0 - 25.0    Anion Gap 16.5 mmol/L   Procalcitonin    Collection Time: 03/07/17 11:41 AM   Result Value Ref Range    Procalcitonin 1.10 (C) 0.10 - 0.25 ng/mL       Radiology: Renal ultrasound personally reviewed, no obstruction, small kidneys consistent with advanced chronic kidney disease  Old records personally reviewed, baseline creatinine 2.1, stage IV chronic kidney disease  The above labs personally reviewed by me  CT and pelvis personally reviewed showing pancreatic edema, possibly acute pancreatitis  High risk med: zometa, requires close monitoring of calcium levels after administration       Medications have been reviewed:  Current Facility-Administered Medications   Medication  Dose Route Frequency Provider Last Rate Last Dose   • acetaminophen (TYLENOL) tablet 650 mg  650 mg Oral Q4H PRN Oleksandr Keyes MD       • amiodarone (PACERONE) tablet 200 mg  200 mg Oral BID Oleksandr Keyes MD   200 mg at 03/07/17 0915   • bumetanide (BUMEX) injection 1 mg  1 mg Intravenous BID Filipe Feldman MD   1 mg at 03/07/17 0915   • calcitonin (MIACALCIN) injection 50 Units  50 Units Intramuscular BID Filipe Feldman MD       • diltiaZEM CD (CARDIZEM CD) 24 hr capsule 120 mg  120 mg Oral Q24H Oleksandr Keyes MD   120 mg at 03/07/17 0915   • enoxaparin (LOVENOX) syringe 30 mg  30 mg Subcutaneous Daily Oleksandr Keyes MD   30 mg at 03/07/17 0915   • fluticasone (FLONASE) 50 MCG/ACT nasal spray 2 spray  2 spray Nasal Daily Oleksandr Keyes MD   2 spray at 03/07/17 0915   • Glycerin-Hypromellose- (ARTIFICIAL TEARS) 0.2-0.2-1 % ophthalmic solution solution 1 drop  1 drop Both Eyes Q1H PRN Oleksandr Keyes MD       • hydrALAZINE (APRESOLINE) tablet 75 mg  75 mg Oral TID Oleksandr Keyes MD   75 mg at 03/07/17 0915   • HYDROmorphone (DILAUDID) injection 0.5 mg  0.5 mg Intravenous Q2H PRN Oleksandr Keyes MD   0.5 mg at 03/07/17 1325    And   • naloxone (NARCAN) injection 0.4 mg  0.4 mg Intravenous Q5 Min PRN Oleksandr Keyes MD       • ipratropium-albuterol (DUO-NEB) nebulizer solution 3 mL  3 mL Nebulization Q4H PRN Oleksandr Keyes MD       • levothyroxine (SYNTHROID, LEVOTHROID) tablet 75 mcg  75 mcg Oral Q AM Oleksandr Keyes MD   75 mcg at 03/07/17 0604   • nitroglycerin (NITROSTAT) SL tablet 0.4 mg  0.4 mg Sublingual Q5 Min PRN Oleksandr Keyes MD       • ondansetron (ZOFRAN) tablet 4 mg  4 mg Oral Q6H PRN Oleksandr Keyes MD        Or   • ondansetron ODT (ZOFRAN-ODT) disintegrating tablet 4 mg  4 mg Oral Q6H PRN Oleksandr Keyes MD        Or   • ondansetron (ZOFRAN) injection 4 mg  4 mg Intravenous Q6H PRN Oleksandr Keyes MD   4 mg at 03/06/17 0948   •  oxyCODONE-acetaminophen (PERCOCET) 5-325 MG per tablet 1 tablet  1 tablet Oral Q4H PRN Oleksandr Keyes MD   1 tablet at 03/07/17 0915   • phenazopyridine (PYRIDIUM) tablet 100 mg  100 mg Oral Every Other Day Jocelyn Khan MD   100 mg at 03/06/17 0930   • saccharomyces boulardii (FLORASTOR) capsule 250 mg  250 mg Oral BID Miguel Demarco MD   250 mg at 03/07/17 0915   • sennosides-docusate sodium (SENOKOT-S) 8.6-50 MG tablet 2 tablet  2 tablet Oral Nightly PRN Oleksandr Keyes MD       • sodium chloride 0.9 % flush 1-10 mL  1-10 mL Intravenous PRN Oleksandr Keyes MD       • sodium chloride 0.9 % flush 10 mL  10 mL Intravenous PRN Sanju Sharma MD       • sodium chloride 0.9 % infusion  125 mL/hr Intravenous Continuous Filipe Feldman  mL/hr at 03/07/17 1325 125 mL/hr at 03/07/17 1325       Assessment/Plan     Principal Problem:    Sepsis  Active Problems:    Atrial fibrillation    Left bundle branch block (LBBB)    Acute respiratory failure with hypoxia    COPD (chronic obstructive pulmonary disease)    UTI (urinary tract infection)    Acute on chronic renal failure    Pneumonia due to infectious organism    Idiopathic acute pancreatitis      Assessment:  (Acute renal failure, slowly improving with IV fluids, likely secondary to dehydration and hypercalcemia  Hypercalcemia, no clear etiology, had been improving but stable again today, difficult decision options, see below  Chronic kidney disease stage IV   New diagnosis acute pancreatitis   dehydration  Urinary tract infection  Pneumonia, on IV antibiotics   sepsis syndrome improving  COPD  Acute hypoxemic respiratory failure    Plan:  Renal function and calcium level are marginally improved today  Given new diagnosis of acute pancreatitis, possibly secondary to hypercalcemia, more rapid lowering of serum calcium would be ideal  Unfortunately treatment options are limited.  She has had suboptimal response to IV fluids plus IV  diuretic/ forced diuresis,  Pamidronate is contraindicated at a GFR less than 30  Will try low-dose Zometa, monitor calcium levels closely).             Continue to monitor renal function, electroytes and volume closely   Please call me with any questions or concerns      Filipe Feldman MD   [facility]   [phone]    03/07/17  3:47 PM        EMR Dragon/Transcription disclaimer:    Much of this encounter note is an electronic transcription/translation of spoken language to printed text. The electronic translation of spoken language may permit erroneous, or at times, nonsensical words or phrases to be inadvertently transcribed; Although I have reviewed the note for such errors, some may still exist.

## 2017-03-07 NOTE — PROGRESS NOTES
Discharge Planning Assessment  [facility]     Patient Name: Clementina Betancur  MRN: [id number]  Today's Date: 3/7/2017    Admit Date: 3/3/2017          Discharge Needs Assessment       03/07/17 1623    Living Environment    Lives With child(harris), adult    Living Arrangements house    Provides Primary Care For no one, unable/limited ability to care for self    Primary Care Provided By child(harris) (specify)    Quality Of Family Relationships supportive    Able to Return to Prior Living Arrangements yes    Discharge Needs Assessment    Concerns To Be Addressed basic needs concerns    Readmission Within The Last 30 Days no previous admission in last 30 days    Anticipated Changes Related to Illness inability to care for someone else    Equipment Currently Used at Home walker, jonny;cane, straight    Equipment Needed After Discharge none    Transportation Available car;family or friend will provide            Discharge Plan       03/07/17 1624    Case Management/Social Work Plan    Plan Home with Bam    Patient/Family In Agreement With Plan yes    Additional Comments S/W daughter, Calista Skinner over the telephone, patient tearful and not feeling well.  Daughter verified face sheet.  IMM noted.  Patient lives at home with daughter, since she has been sick.  Patient was driving, daughter drives patient to appointments, or [facility] sees patient at home.  Patient has been at [facility] and would like to return, if needed.  Patient would like to go home with daughter and is current with [facility].  Anabell/Miya notified of patient admission.  Patient receives medicines from [facility] and has no problems getting them.  No living will.  Will continue to monitor and assist.  Salma, RN, CCP        Discharge Placement     No information found                Demographic Summary       03/07/17 1621    Referral Information    Admission Type inpatient    Arrived From home or self-care    Referral Source admission list     Reason For Consult discharge planning    Record Reviewed plan of care    Contact Information    Permission Granted to Share Information With family/designee   Calista Skinner dtr and [phone]    Primary Care Physician Information    Name JAUNJolynn AFUA            Functional Status       03/07/17 6836    Functional Status Current    Ambulation 4-->completely dependent    Transferring 4-->completely dependent    Toileting 4-->completely dependent    Bathing 4-->completely dependent    Dressing 2-->assistive person    Eating 2-->assistive person    Communication 0-->understands/communicates without difficulty    Swallowing (if score 2 or more for any item, consult Rehab Services) 0-->swallows foods/liquids without difficulty    Functional Status Prior    Ambulation 3-->assistive equipment and person    Transferring 2-->assistive person    Toileting 2-->assistive person    Bathing 2-->assistive person    Dressing 2-->assistive person    Eating 0-->independent    Communication 0-->understands/communicates without difficulty    Swallowing 0-->swallows foods/liquids without difficulty    IADL    Medications assistive person    Meal Preparation assistive person    Housekeeping assistive person    Laundry assistive person    Shopping assistive person    Oral Care independent    Cognitive/Perceptual/Developmental    Current Mental Status/Cognitive Functioning unable to assess            Psychosocial     None            Abuse/Neglect     None            Legal     None            Substance Abuse     None            Patient Forms     None          Minna Burkett RN

## 2017-03-07 NOTE — PROGRESS NOTES
[facility] INPATIENT PROGRESS NOTE         [address]    3/7/2017      PATIENT IDENTIFICATION:   Name:  Clementina Betancur      MRN:  [id number]     89 y.o.  female             LOS 4    Reason for visit: Follow-up sepsis with pneumonia and hypoxemic respiratory failure      SUBJECTIVE:    Positive cough.  Positive for SOA.  Denies current chest pain or nausea and vomiting.    Objective   OBJECTIVE:    Vital Sign Min/Max for last 24 hours  Temp  Min: 97.3 °F (36.3 °C)  Max: 97.8 °F (36.6 °C)   BP  Min: 109/51  Max: 149/60   Pulse  Min: 58  Max: 65   Resp  Min: 18  Max: 18   SpO2  Min: 94 %  Max: 99 %   Flow (L/min)  Min: 3  Max: 3   Weight  Min: 146 lb (66.2 kg)  Max: 146 lb (66.2 kg)                         Body mass index is 28.51 kg/(m^2).    Intake/Output Summary (Last 24 hours) at 03/07/17 1003  Last data filed at 03/06/17 1841   Gross per 24 hour   Intake      0 ml   Output    650 ml   Net   -650 ml         Exam:  GEN:  No distress, appears stated age  EYES:   PERRL, anicteric sclera  ENT:    External ears/nose normal, OP clear  NECK:  No adenopathy, midline trachea  LUNGS: Normal chest on inspection, palpation and diminished breath sounds bilaterally with rales bases on auscultation  CV:  Normal S1S2, without murmur  ABD:  Non tender, non distended, no hepatosplenomegaly, +BS  EXT:  No edema, cyanosis or clubbing    Scheduled meds:      amiodarone 200 mg Oral BID   bumetanide 1 mg Intravenous BID   cefepime 1 g Intravenous Q24H   diltiaZEM  mg Oral Q24H   enoxaparin 30 mg Subcutaneous Daily   fluticasone 2 spray Nasal Daily   hydrALAZINE 75 mg Oral TID   levothyroxine 75 mcg Oral Q AM   phenazopyridine 100 mg Oral Every Other Day   saccharomyces boulardii 250 mg Oral BID     IV meds:                          sodium chloride 125 mL/hr Last Rate: 125 mL/hr (03/07/17 2061)     Data Review:    Results from last 7 days  Lab Units 03/07/17  0600 03/06/17  0802 03/05/17  0551 03/04/17  0612  03/03/17  1846   SODIUM mmol/L 138 138 136 135* 134*   POTASSIUM mmol/L 4.9 4.7 4.5 5.0 5.6*   CHLORIDE mmol/L 100 99 98 94* 90*   TOTAL CO2 mmol/L 21.5* 25.8 25.0 26.3 29.0   BUN mg/dL 83* 86* 98* 106* 100*   CREATININE mg/dL 3.43* 3.62* 4.37* 4.82* 5.11*   GLUCOSE mg/dL 81 71 73 82 100*   CALCIUM mg/dL 11.9* 12.0* 11.7* 11.9* 13.8*         Estimated Creatinine Clearance: 9.4 mL/min (by C-G formula based on Cr of 3.43).    Results from last 7 days  Lab Units 03/07/17  0600 03/06/17  0802 03/05/17  0551 03/04/17  0612 03/03/17  1846   WBC 10*3/mm3 14.35* 11.58* 12.70* 12.47* 12.92*   HEMOGLOBIN g/dL 9.4* 10.1* 10.4* 10.2* 10.8*   PLATELETS 10*3/mm3 348 366 371 367 438       Results from last 7 days  Lab Units 03/04/17  0612   INR  1.01       Results from last 7 days  Lab Units 03/07/17  0600 03/05/17  0551 03/04/17  0612 03/03/17  1846   ALT (SGPT) U/L 100* 95* 69* 74*   AST (SGOT) U/L 94* 106* 60* 60*       Microbiology reviewed : Greater than 100,000 colonies pseudomonas in urine         CT chest viewed:  Atypical bilateral infiltrates versus asymmetric vascular congestion.    CT abd lung cuts reveal:  Visualized lung bases demonstrate areas of patchy infiltrate superimposed on  chronic background interstitial change.    Assessment   ASSESSMENT:   Sepsis  Bibasilar patchy pneumonia  COPD  Acute hypoxemia with respiratory failure  UTI: Pseudomonas in culture  Recent severe C. difficile colitis  Acute kidney injury  Possible pancreatitis       PLAN:  Antibiotics being managed by infectious disease.  Encourage pulmonary toilet.      Tucker Sim MD  Pulmonary and Critical Care Medicine  [facility]  3/7/2017    10:03 AM

## 2017-03-07 NOTE — PROGRESS NOTES
INFECTIOUS DISEASES PROGRESS NOTE    CC: f/u sepsis    S:   Still with abdominal pain  No fevers    O:  Physical Exam:  Temp:  [97.3 °F (36.3 °C)-97.8 °F (36.6 °C)] 97.6 °F (36.4 °C)  Heart Rate:  [58-65] 62  Resp:  [18] 18  BP: (109-149)/(51-61) 135/61  Physical Exam   Constitutional: She has a sickly appearance.   HENT:   Mouth/Throat: No oropharyngeal exudate (dry).   Eyes: Conjunctivae and lids are normal. Pupils are equal, round, and reactive to light.   Cardiovascular: Normal rate.  An irregular rhythm present.   1+ edema   Pulmonary/Chest: Effort normal and breath sounds normal.   Abdominal: Soft. She exhibits no distension. There is no hepatosplenomegaly. There is generalized tenderness.   Neurological: She is alert.   Skin: Skin is warm and dry. No rash noted.   Psychiatric: Her mood appears anxious. Her affect is labile. She is withdrawn.        Diagnostics:     Cr 3.43    AST 94    Ca 11.9  WBC 14.4  H/H 9/29    Amylase 134, lipase 534    CT a/p: acute pancreatitis in head of pancreas with stranding  Bcx are ngtd    Estimated Creatinine Clearance: 9.4 mL/min (by C-G formula based on Cr of 3.43).    Assessment/Plan   1. AHRF  2. LANCE  3. Pancreatitis in setting of hypercalcemia  4. H/o CDI  5. Pseudomonas uti    CT scan shows focal pancreatitis which should explain abdominal pain, elevated pancreatic enzymes and an LFT changes.  I suspect this is also why her white count is slightly elevated.  She has completed a course for cystitis with cefepime and I think the benefits of continued antibiotics are outweighed by the risk including C. difficile colitis.  We will stop cefepime today.  I would continue probiotics for 2 more weeks.  Discussed with Dr. Levy in the etiology of her elevated Procalcitonin may indeed be 2/2 acute kidney injury and pancreatitis/hypercalcemia.  We will repeat today to define her posttreatment baseline.        Miguel Demarco MD  10:27 AM  03/07/17

## 2017-03-07 NOTE — PLAN OF CARE
Problem: Patient Care Overview (Adult)  Goal: Plan of Care Review  Outcome: Ongoing (interventions implemented as appropriate)    03/07/17 0347   Coping/Psychosocial Response Interventions   Plan Of Care Reviewed With patient   Patient Care Overview   Progress no change   Outcome Evaluation   Outcome Summary/Follow up Plan pt medicated for pain as needed. abdominal binder remains in place r/t umbilical hernia. no s/s of acute distress noted. will continue to monitor pt.        Goal: Adult Individualization and Mutuality  Outcome: Ongoing (interventions implemented as appropriate)  Goal: Discharge Needs Assessment  Outcome: Ongoing (interventions implemented as appropriate)    Problem: Renal Failure/Kidney Injury, Acute (Adult)  Goal: Signs and Symptoms of Listed Potential Problems Will be Absent or Manageable (Renal Failure/Kidney Injury, Acute)  Outcome: Ongoing (interventions implemented as appropriate)    Problem: Pain, Acute (Adult)  Goal: Acceptable Pain Control/Comfort Level  Outcome: Ongoing (interventions implemented as appropriate)    Problem: Fall Risk (Adult)  Goal: Absence of Falls  Outcome: Ongoing (interventions implemented as appropriate)    Problem: Skin Integrity Impairment, Risk/Actual (Adult)  Goal: Identify Related Risk Factors and Signs and Symptoms  Outcome: Outcome(s) achieved Date Met:  03/07/17  Goal: Skin Integrity/Wound Healing  Outcome: Ongoing (interventions implemented as appropriate)

## 2017-03-07 NOTE — CONSULTS
[facility]  Initial Inpatient Consult Note    Referring Provider: Roscoe Levy    Reason for Consultation: Pancreatitis    Subjective     History of present illness:      The patient is an 89 year old female admitted on 3/3/17 with sepsis due to bilateral pneumonia,  ARF, and pseudomonas UTI. She is familiar to our service from a previous admission in December 2016 when she developed C.diff unresponsive to Flagy then transitioned to oral Vancomycin. Yesterday she developed epigastric pain with nausea.  A non contrasted CT scan of the abdomen and pelvis demonstrated acute pancreatitis. Her pancreatic enzymes are elevated and her liver enzymes have been steadily increasing since admission. Her WBC remains elevated along with persistent hypercalcemia. There is no known history of pancreatitis. She is on amiodarone for atrial fibrillation. No prior hx of pancreatitis.  No significant EtOh.  Prior cholecystectomy.        Past Medical History:  Past Medical History   Diagnosis Date   • Acute respiratory failure with hypoxia    • Arthritis    • Asthma    • Atrial fibrillation    • Cancer      multiple skincancers   • Candidal stomatitis    • CHF (congestive heart failure)    • Chronic kidney disease, stage 4, severely decreased GFR    • COPD (chronic obstructive pulmonary disease)    • Enterocolitis due to Clostridium difficile 12/29/2016     positive specimen collected on 12/29/2016   • History of falling    • History of transfusion      1989   • Hypertension    • Hypothyroidism    • Muscle weakness (generalized)    • Need for assistance with personal care    • Renal disorder    • Weakness        Past Surgical History:  Past Surgical History   Procedure Laterality Date   • Hernia repair     • Colectomy partial / total Right 1989   • Hysterectomy     • Abdominal surgery     • Appendectomy     • Cholecystectomy Right    • Winter's neuroma excision Right    • Skin cancer excision     • Bronchoscopy  N/A 1/12/2017     Procedure: BRONCHOSCOPY  WITH ANESTHESIA   with Bilateral  Lung  Washings;  Surgeon: Lester Andino MD;  Location: [facility] ENDOSCOPY;  Service:         Social History:   Social History   Substance Use Topics   • Smoking status: Never Smoker   • Smokeless tobacco: Not on file   • Alcohol use Yes      Comment: social        Family History:  History reviewed. No pertinent family history.    Home Meds:  Prescriptions Prior to Admission   Medication Sig Dispense Refill Last Dose   • acetaminophen-codeine (TYLENOL #3) 300-30 MG per tablet Take 0.5 tablets by mouth 3 (Three) Times a Day. 0.5 tablet in the morning and noon. 1 tablet at bed time. 12 tablet 0    • amiodarone (PACERONE) 200 MG tablet Take 200 mg by mouth 2 (Two) Times a Day.      • bumetanide (BUMEX) 1 MG tablet Take 1 mg by mouth Daily.      • Cholecalciferol (VITAMIN D3) 5000 UNITS capsule capsule Take 5,000 Units by mouth Daily.   12/26/2016   • diltiaZEM CD (CARDIZEM CD) 120 MG 24 hr capsule Take 1 capsule by mouth Daily.      • fluticasone (FLONASE) 50 MCG/ACT nasal spray 2 sprays into each nostril Daily.      • Glycerin-Hypromellose- (ARTIFICIAL TEARS) 0.2-0.2-1 % solution ophthalmic solution Administer 1 drop to both eyes Every 1 (One) Hour As Needed for dry eyes.  0    • hydrALAZINE (APRESOLINE) 25 MG tablet Take 3 tablets by mouth 3 (Three) Times a Day.      • ipratropium-albuterol (DUO-NEB) 0.5-2.5 mg/mL nebulizer Take 3 mL by nebulization Every 4 (Four) Hours As Needed for shortness of air. 360 mL     • levothyroxine (LEVOTHROID) 75 MCG tablet Take 75 mcg by mouth Daily.      • Multiple Vitamins-Minerals (CENTRUM SILVER PO) Take 1 tablet by mouth Daily.   12/26/2016 at Unknown time   • phenol (CHLORASEPTIC) 1.4 % liquid liquid Apply 2 sprays to the mouth or throat Every 2 (Two) Hours As Needed (sore throat).  0    • potassium chloride (K-DUR) 10 MEQ CR tablet Take 10 mEq by mouth Daily.      • promethazine (PHENERGAN) 25 MG  tablet Take 12.5 mg by mouth Every 4 (Four) Hours As Needed for nausea or vomiting.      • sodium chloride (OCEAN) 0.65 % nasal spray 2 sprays into each nostril As Needed for congestion.  12    • white petrolatum (VASELINE) gel gel Apply  topically Daily. Apply to upper lip topically      • acetaminophen (TYLENOL) 325 MG tablet Take 2 tablets by mouth Every 4 (Four) Hours As Needed for mild pain (1-3).  0    • ipratropium-albuterol (DUO-NEB) 0.5-2.5 mg/mL nebulizer Take 3 mL by nebulization 4 (Four) Times a Day. 360 mL     • levothyroxine (SYNTHROID, LEVOTHROID) 50 MCG tablet Take 50 mcg by mouth Daily.   12/26/2016 at Unknown time       Current Meds:     amiodarone 200 mg Oral BID   bumetanide 1 mg Intravenous BID   diltiaZEM  mg Oral Q24H   enoxaparin 30 mg Subcutaneous Daily   fluticasone 2 spray Nasal Daily   hydrALAZINE 75 mg Oral TID   levothyroxine 75 mcg Oral Q AM   phenazopyridine 100 mg Oral Every Other Day   saccharomyces boulardii 250 mg Oral BID       Allergies:  Allergies   Allergen Reactions   • Augmentin [Amoxicillin-Pot Clavulanate]    • Ceftin [Cefuroxime Axetil]    • Ciprofloxacin    • Colchicine    • Garlic    • Hydrocodone    • Rabeprazole    • Tetracyclines & Related        Review of Systems  All systems were reviewed and negative except for:  Constitution:  positive for anorexia, fatigue and fevers  Respiratory: positive for  shortness of air  Gastrointestinal: postitive for  bloating / distention, nausea and pain     Objective     Vital Signs  Temp:  [97.3 °F (36.3 °C)-97.8 °F (36.6 °C)] 97.6 °F (36.4 °C)  Heart Rate:  [58-65] 62  Resp:  [18] 18  BP: (109-149)/(51-61) 135/61    Physical Exam:  General Appearance:    Thin, chronically ill appearing   Head:    Normocephalic, without obvious abnormality, atraumatic   Eyes:            Lids and lashes normal, conjunctivae and sclerae normal, no   icterus   Throat:   No oral lesions, no thrush, oral mucosa moist   Neck:   No adenopathy,  supple, trachea midline, no thyromegaly, no   carotid bruit, no JVD   Lungs:     Clear to auscultation,respirations regular, even and                   unlabored    Heart:    Regular rhythm and normal rate, normal S1 and S2, no            murmur, no gallop, no rub, no click   Chest Wall:    No abnormalities observed   Abdomen:     Abdominal binder in place, mild upper epigastric TTP, no rebound, +BS   Rectal:     Deferred   Extremities:   no edema, no cyanosis, no redness   Skin:   No bleeding, bruising or rash   Lymph nodes:   No palpable adenopathy   Psychiatric:  Judgement and insight: normal   Orientation to person place and time: normal   Mood and affect: normal     Results Review:   I reviewed the patient's new clinical results.  I reviewed the patient's new imaging results and agree with the interpretation.      Results from last 7 days  Lab Units 03/07/17  0600 03/06/17  0802 03/05/17  0551   WBC 10*3/mm3 14.35* 11.58* 12.70*   HEMOGLOBIN g/dL 9.4* 10.1* 10.4*   HEMATOCRIT % 29.1* 30.7* 31.6*   PLATELETS 10*3/mm3 348 366 371         Results from last 7 days  Lab Units 03/07/17  0600 03/06/17  0802 03/05/17  0551 03/04/17  0612   SODIUM mmol/L 138 138 136 135*   POTASSIUM mmol/L 4.9 4.7 4.5 5.0   CHLORIDE mmol/L 100 99 98 94*   TOTAL CO2 mmol/L 21.5* 25.8 25.0 26.3   BUN mg/dL 83* 86* 98* 106*   CREATININE mg/dL 3.43* 3.62* 4.37* 4.82*   CALCIUM mg/dL 11.9* 12.0* 11.7* 11.9*   BILIRUBIN mg/dL 0.2  --  0.2 0.2   ALK PHOS U/L 249*  --  171* 143*   ALT (SGPT) U/L 100*  --  95* 69*   AST (SGOT) U/L 94*  --  106* 60*   GLUCOSE mg/dL 81 71 73 82         Results from last 7 days  Lab Units 03/04/17  0612   INR  1.01         0  Lab Value Date/Time   LIPASE 534 (H) 03/07/2017 0600       Radiology:  Imaging Results (last 72 hours)     Procedure Component Value Units Date/Time    US Renal Bilateral [[id number]] Collected:  03/04/17 1556     Updated:  03/04/17 1600    Narrative:       US RENAL BILATERAL-     INDICATIONS:  Acute kidney injury     TECHNIQUE: ULTRASOUND OF THE KIDNEYS AND URINARY BLADDER.     COMPARISON: 12/27/2016     FINDINGS:     The right kidney measures 8.4 centimeters, the left kidney measures 7.6  centimeters.     No renal lesion is identified. No hydronephrosis or echogenic  nephrolithiasis.     Bilateral ureteral jets were observed. The urinary bladder appears  unremarkable.       Impression:       No hydronephrosis or echogenic nephrolithiasis.     This report was finalized on 3/4/2017 3:57 PM by Dr. Nic King MD.       CT Chest Without Contrast [[id number]] Collected:  03/04/17 1625     Updated:  03/04/17 1812    Narrative:       CT CHEST WO CONTRAST-     CLINICAL: Left lung base opacity described on 03/03/2017 chest  radiograph.     FINDINGS: Interstitial infiltrates demonstrated within both lungs.  Atypical pneumonia versus asymmetric vascular congestion.     Thyroid is diffusely enlarged. There is cardiac enlargement. The  esophagus is satisfactory in course and caliber. Caliber of the aorta is  within normal limits. Old granulomatous disease involving both lungs  with associated calcified bilateral hilar lymph nodes. There is diffuse  body wall edema. Limited images through the upper abdomen demonstrate  ventral herniorrhaphy repair with recurrence along its right lateral  border.     This report was finalized on 3/4/2017 6:08 PM by Dr. Oumar Harrison MD.       CT Abdomen Pelvis Without Contrast [[id number]] Collected:  03/06/17 1617     Updated:  03/06/17 1710    Narrative:       CT ABDOMEN AND PELVIS WITHOUT CONTRAST     HISTORY: Abdominal pain, nausea, abnormal LFTs.     TECHNIQUE: Spiral axial CT imaging of the abdomen and pelvis was  performed at 3 mm intervals throughout. No intravenous contrast was  administered for this imaging.     COMPARISON: A previous noncontrasted CT abdomen and pelvis 03/31/2013 is  reviewed.     FINDINGS: Liver demonstrates mild diffuse increased  attenuation,  consistent with known amiodarone therapy. This has clearly progressed  when compared with imaging of 2013. No focal hepatic lesions are  identified. There has been prior cholecystectomy. There is no biliary  ductal dilatation. Spleen is normal in size and configuration.      Pancreatic parenchyma appears grossly unremarkable. There is however  relative edema at the pancreatic head and surrounding fat planes. I  cannot exclude pancreatitis. The adrenal glands and kidneys demonstrate  an unremarkable noncontrast appearance. The remaining colon appears  unremarkable. No wall thickening or inflammatory changes are identified.  There appears to have been a right hemicolectomy. There has been a  previous ventral hernia repair with mesh in place. There is a recurrent  hernia just to the right of the mesh at the mid abdomen containing  mesenteric fat and several small bowel loops. There is no bowel  obstruction or evidence for strangulation. This hernia appears stable  from previous imaging. Stomach is unremarkable. There is moderate  retained foodstuff, similar to prior exam. Some element of gastroparesis  should be considered. Bladder is decompressed by Manning catheter. There  is mild diffuse third-spacing of fluid throughout the abdominal wall.  There is no free intraperitoneal fluid. There is no free air. Visualized  lung bases demonstrate areas of patchy infiltrate superimposed on  chronic background interstitial change. There are trace bilateral  pleural effusions.       Impression:       The head of the pancreas appears edematous and there is  stranding in the adjacent retroperitoneal fat. I cannot exclude  pancreatitis. Correlation with serum amylase and lipase is recommended.  No further potential acute intraabdominal or pelvic process is present.  Other findings are delineated above.     This report was finalized on 3/6/2017 5:07 PM by Dr. Alfie Wesotn MD.             Assessment/Plan     Principal  Problem:    Sepsis  Active Problems:    Atrial fibrillation    Left bundle branch block (LBBB)    Acute respiratory failure with hypoxia    COPD (chronic obstructive pulmonary disease)    UTI (urinary tract infection)    Acute on chronic renal failure    Pneumonia due to infectious organism    Idiopathic acute pancreatitis      Impression:  1. Acute pancreatitis - etiology unclear.    No obvious offending medicines but has been on multiple abx during course of her various hospitalizaitons.  In the end pancreatitis is probably multifactorial related to her multiple underlying illnessess/sepsis  2. Elevated liver enzymes - ? 2/2 sepsis vs DILI  3. Pneumonia    Recommendations:  Check abdomina u/s  Check lipid panel  Continue current NPO status and IVFs  Pain management per primary team    We will continue to follow    I discussed the patients findings and my recommendations with patient, family and nursing staff

## 2017-03-07 NOTE — PROGRESS NOTES
"   LOS: 4 days   Patient Care Team:  Catrina Santiago MD as PCP - General (Internal Medicine)    Chief Complaint: DELMI pain    Subjective     HPI Comments: C/o DELMI pain. No nausea today.    Weakness - Generalized   Associated symptoms include nausea and weakness. Pertinent negatives include no anorexia, chest pain, coughing, fever or vomiting.   Female Dysuria    Associated symptoms include nausea. Pertinent negatives include no vomiting.       Subjective:  Symptoms:  Improved.  She reports malaise and weakness.  No shortness of breath, cough, chest pain, headache, chest pressure, anorexia, diarrhea or anxiety.    Diet:  Poor intake.  She reports  nausea.  No vomiting.    Activity level: Impaired due to weakness.    Pain:  She complains of pain that is mild.  She reports pain is unchanged.  Pain is well controlled.        History taken from: patient chart    Objective     Vital Signs  Temp:  [97.3 °F (36.3 °C)-97.8 °F (36.6 °C)] 97.6 °F (36.4 °C)  Heart Rate:  [58-65] 62  Resp:  [18] 18  BP: (109-149)/(51-61) 135/61    Objective:  General Appearance:  In no acute distress and uncomfortable.    Vital signs: (most recent): Blood pressure 135/61, pulse 62, temperature 97.6 °F (36.4 °C), temperature source Oral, resp. rate 18, height 60\" (152.4 cm), weight 146 lb (66.2 kg), SpO2 99 %.  Vital signs are normal.  No fever.    Output: Producing urine (orange urine in vergara bag) and no stool output.    HEENT: Normal HEENT exam.    Lungs:  Normal respiratory rate and normal effort.  Breath sounds clear to auscultation.  (Anteriorly)  Heart: Normal rate.  Regular rhythm.    Abdomen: Abdomen is soft.  Bowel sounds are normal.   There is epigastric tenderness. There is no rebound tenderness.  There is no guarding.     Extremities: There is no dependent edema.    Pulses: Distal pulses are intact.    Neurological: Patient is alert and oriented to person, place and time.    Skin:  Warm and dry.              Results Review:     I " reviewed the patient's new clinical results.  I reviewed the patient's new imaging results and agree with the interpretation.  I reviewed the patient's other test results and agree with the interpretation  Discussed with patient and Dr. Demarco    Medication Review: reviewed and adjusted    Assessment/Plan     Principal Problem:    Sepsis  Active Problems:    Atrial fibrillation    Left bundle branch block (LBBB)    Acute respiratory failure with hypoxia    COPD (chronic obstructive pulmonary disease)    UTI (urinary tract infection)    Acute on chronic renal failure    Pneumonia due to infectious organism      Assessment:  (1. Sepsis due to #2 & #3  2. Bilateral HCAP vs Pulm Edema  3. UTI (Pseudomonas)  4. Acute hypoxic resp failure  5. LANCE/CKD4  6. Recent CDiff colitis  7. HyperK+/Ca++/Mg++  8. Elevated Trop  9. LBBB  10. Afib (no AC due to falls)  11. COPD  12. HTN  13. HypoT4).     Plan:   (Appreciate Pulm/Renal/ID attention to patient  D/c Cefepime and observe  Monitor Cr, currently on IVFs per Renal--watch for volume overload/pulm edema  IV Bumex started to help with urinary calcium excretion  Certainly at high risk for recurrence of CDiff, but no other options at present but to treat her UTI. Some question per Pulm as to whether she has PNA or merely asymmetric pulmonary edema. PCT could be elevated for various reasons. Will repeat.  CT A&P revealed changes c/w pancreatitis and Betzy/Lipase elevated this AM, wonder if due to hyperCa++  She also has elevated alk phos and LFTs, will ask GI to see  Gut rest, continue IVFs).       Roscoe Levy MD  03/07/17  9:59 AM    Time: 25min

## 2017-03-08 NOTE — PROGRESS NOTES
[facility] INPATIENT PROGRESS NOTE         [address]    3/8/2017      PATIENT IDENTIFICATION:   Name:  Clementina Betancur      MRN:  [id number]     89 y.o.  female             LOS 5    Reason for visit: Follow-up sepsis with pneumonia and hypoxemic respiratory failure      SUBJECTIVE:    Positive nonproductive cough.  Positive for SOA.  Complains of chest pain/epigastric pain.  Denies current chest pain or nausea and vomiting.    Objective   OBJECTIVE:    Vital Sign Min/Max for last 24 hours  Temp  Min: 96.9 °F (36.1 °C)  Max: 97.7 °F (36.5 °C)   BP  Min: 116/78  Max: 135/66   Pulse  Min: 67  Max: 94   Resp  Min: 18  Max: 18   SpO2  Min: 96 %  Max: 99 %   Flow (L/min)  Min: 3  Max: 3   Weight  Min: 145 lb (65.8 kg)  Max: 145 lb (65.8 kg)                         Body mass index is 28.32 kg/(m^2).    Intake/Output Summary (Last 24 hours) at 03/08/17 0922  Last data filed at 03/08/17 0458   Gross per 24 hour   Intake   1290 ml   Output    550 ml   Net    740 ml         Exam:  GEN:  No distress, appears stated age  EYES:   PERRL, anicteric sclera  ENT:    External ears/nose normal, OP clear  NECK:  No adenopathy, midline trachea  LUNGS: Normal chest on inspection, palpation and diminished breath sounds bilaterally with rales bases on auscultation  CV:  Normal S1S2, without murmur  ABD:  Mildly tender, non distended, no hepatosplenomegaly, +BS  EXT:  No edema, cyanosis or clubbing    Scheduled meds:      amiodarone 200 mg Oral BID   bumetanide 1 mg Intravenous BID   diltiaZEM  mg Oral Q24H   enoxaparin 30 mg Subcutaneous Daily   fluticasone 2 spray Nasal Daily   hydrALAZINE 75 mg Oral TID   levothyroxine 75 mcg Oral Q AM   phenazopyridine 100 mg Oral Every Other Day   saccharomyces boulardii 250 mg Oral BID     IV meds:                          sodium chloride 125 mL/hr Last Rate: 125 mL/hr (03/07/17 2044)     Data Review:    Results from last 7 days  Lab Units 03/08/17  0714 03/07/17  0600  03/06/17  0802 03/05/17  0551 03/04/17  0612   SODIUM mmol/L 139 138 138 136 135*   POTASSIUM mmol/L 4.3 4.9 4.7 4.5 5.0   CHLORIDE mmol/L 102 100 99 98 94*   TOTAL CO2 mmol/L 21.1* 21.5* 25.8 25.0 26.3   BUN mg/dL 81* 83* 86* 98* 106*   CREATININE mg/dL 3.12* 3.43* 3.62* 4.37* 4.82*   GLUCOSE mg/dL 67 81 71 73 82   CALCIUM mg/dL 11.0* 11.9* 12.0* 11.7* 11.9*         Estimated Creatinine Clearance: 10.3 mL/min (by C-G formula based on Cr of 3.12).    Results from last 7 days  Lab Units 03/08/17  0659 03/07/17  0600 03/06/17  0802 03/05/17  0551 03/04/17  0612   WBC 10*3/mm3 14.92* 14.35* 11.58* 12.70* 12.47*   HEMOGLOBIN g/dL 10.2* 9.4* 10.1* 10.4* 10.2*   PLATELETS 10*3/mm3 391 348 366 371 367       Results from last 7 days  Lab Units 03/04/17  0612   INR  1.01       Results from last 7 days  Lab Units 03/08/17  0714 03/07/17  0600 03/05/17  0551 03/04/17  0612 03/03/17  1846   ALT (SGPT) U/L 102* 100* 95* 69* 74*   AST (SGOT) U/L 86* 94* 106* 60* 60*       Microbiology reviewed : Greater than 100,000 colonies pseudomonas in urine         CT chest viewed:  Atypical bilateral infiltrates versus asymmetric vascular congestion.    CT abd lung cuts reveal:  Visualized lung bases demonstrate areas of patchy infiltrate superimposed on  chronic background interstitial change.    Assessment   ASSESSMENT:   Sepsis  Bibasilar patchy pneumonia  COPD  Acute hypoxemia with respiratory failure  UTI: Pseudomonas in culture  Recent severe C. difficile colitis  Acute kidney injury  Pancreatitis   Chest pain      PLAN:  Antibiotics being managed by infectious disease.  Encourage pulmonary toilet.  Check troponin and EKG with patient's complaints of chest discomfort this morning.  This may be more epigastric discomfort with possible pancreatitis      Tucker Sim MD  Pulmonary and Critical Care Medicine  [facility], [facility]  3/8/2017    9:22 AM

## 2017-03-08 NOTE — PLAN OF CARE
Problem: Patient Care Overview (Adult)  Goal: Plan of Care Review  Outcome: Ongoing (interventions implemented as appropriate)    03/08/17 0431   Coping/Psychosocial Response Interventions   Plan Of Care Reviewed With patient   Patient Care Overview   Progress no change   Outcome Evaluation   Outcome Summary/Follow up Plan Medicated for pain PRN. Moans out at times with non-specific complaints. Demanded that abd binder be loosened. Will do so while lying in bed. Bed alarm in use.       Goal: Adult Individualization and Mutuality  Outcome: Ongoing (interventions implemented as appropriate)  Goal: Discharge Needs Assessment  Outcome: Ongoing (interventions implemented as appropriate)    Problem: Renal Failure/Kidney Injury, Acute (Adult)  Goal: Signs and Symptoms of Listed Potential Problems Will be Absent or Manageable (Renal Failure/Kidney Injury, Acute)  Outcome: Ongoing (interventions implemented as appropriate)    Problem: Pain, Acute (Adult)  Goal: Acceptable Pain Control/Comfort Level  Outcome: Ongoing (interventions implemented as appropriate)    Problem: Fall Risk (Adult)  Goal: Absence of Falls  Outcome: Ongoing (interventions implemented as appropriate)    Problem: Skin Integrity Impairment, Risk/Actual (Adult)  Goal: Skin Integrity/Wound Healing  Outcome: Ongoing (interventions implemented as appropriate)

## 2017-03-08 NOTE — PROGRESS NOTES
"   LOS: 5 days   Patient Care Team:  Catrina Santiago MD as PCP - General (Internal Medicine)    Chief Complaint: DELMI pain    Subjective     HPI Comments: Still c/o DELMI pain--worse with abd binder. No N/V. Mild SOA.    Weakness - Generalized   Associated symptoms include weakness. Pertinent negatives include no anorexia, chest pain, coughing, fever, nausea or vomiting.   Female Dysuria    Pertinent negatives include no nausea or vomiting.       Subjective:  Symptoms:  Improved.  She reports malaise and weakness.  No shortness of breath, cough, chest pain, headache, chest pressure, anorexia, diarrhea or anxiety.    Diet:  Poor intake.  No nausea or vomiting.    Activity level: Impaired due to weakness.    Pain:  She complains of pain that is mild.  She reports pain is unchanged.  Pain is well controlled.        History taken from: patient chart    Objective     Vital Signs  Temp:  [96.9 °F (36.1 °C)-97.7 °F (36.5 °C)] 97.7 °F (36.5 °C)  Heart Rate:  [67-94] 69  Resp:  [18] 18  BP: (116-135)/(62-78) 131/62    Objective:  General Appearance:  In no acute distress and uncomfortable.    Vital signs: (most recent): Blood pressure 131/62, pulse 69, temperature 97.7 °F (36.5 °C), temperature source Oral, resp. rate 18, height 60\" (152.4 cm), weight 145 lb (65.8 kg), SpO2 99 %.  Vital signs are normal.  No fever.    Output: Producing urine (orange urine in vergara bag) and no stool output.    HEENT: Normal HEENT exam.    Lungs:  Normal respiratory rate and normal effort.  Breath sounds clear to auscultation.  (Anteriorly)  Heart: Normal rate.  Regular rhythm.    Abdomen: Abdomen is soft.  Bowel sounds are normal.   There is epigastric tenderness. There is no rebound tenderness.  There is no guarding.     Extremities: There is no dependent edema.    Pulses: Distal pulses are intact.    Neurological: Patient is alert and oriented to person, place and time.    Skin:  Warm and dry.              Results Review:     I reviewed the " patient's new clinical results.  I reviewed the patient's other test results and agree with the interpretation  Discussed with patient    Medication Review: reviewed    Assessment/Plan     Principal Problem:    Sepsis  Active Problems:    Atrial fibrillation    Left bundle branch block (LBBB)    Acute respiratory failure with hypoxia    COPD (chronic obstructive pulmonary disease)    UTI (urinary tract infection)    Acute on chronic renal failure    Pneumonia due to infectious organism    Idiopathic acute pancreatitis      Assessment:  (1. Sepsis due to #2 & #3  2. Bilateral HCAP vs Pulm Edema  3. UTI (Pseudomonas)  4. Acute hypoxic resp failure  5. LANCE/CKD4  6. Recent CDiff colitis  7. HyperK+/Ca++/Mg++  8. Elevated Trop  9. LBBB  10. Afib (no AC due to falls)  11. COPD  12. HTN  13. HypoT4).     Plan:   (Appreciate Pulm/Renal/ID attention to patient  D/c'd Cefepime and observing per ID  Monitor Cr, currently on IVFs per Renal--watch for volume overload/pulm edema  IV Bumex started to help with urinary calcium excretion, trial of Zometa  Certainly at high risk for recurrence of CDiff, but no other options at present but to treat her UTI. Some question per Pulm as to whether she has PNA or merely asymmetric pulmonary edema. PCT could be elevated for various reasons.  CT A&P revealed changes c/w pancreatitis and Betzy/Lipase elevated this AM, wonder if due to hyperCa++  She also has elevated alk phos and LFTs  Gut rest, IVFs, monitor pancreatic enzymes and LFTs).       Roscoe Levy MD  03/08/17  12:01 PM    Time: 20min

## 2017-03-08 NOTE — PROGRESS NOTES
[facility]/GI Progress Note   Chief Complaint:  pancreatitis    Subjective     Interval History:   Complains of abdominal pain and pain from abdominal binder. Denies nausea or vomiting    History taken from: patient chart RN    Review of Systems:    per HPI    Objective     Vital Signs  Temp:  [96.9 °F (36.1 °C)-97.7 °F (36.5 °C)] 97.7 °F (36.5 °C)  Heart Rate:  [67-94] 69  Resp:  [18] 18  BP: (116-135)/(62-78) 131/62  Body mass index is 28.32 kg/(m^2).    Intake/Output Summary (Last 24 hours) at 03/08/17 0912  Last data filed at 03/08/17 0458   Gross per 24 hour   Intake   1290 ml   Output    550 ml   Net    740 ml          Physical Exam:   General: patient awake, alert and cooperative, moaning in pain   Eyes: Normal lids and lashes, no scleral icterus, no conjunctival pallor   Neck: supple, normal ROM, no tracheal deviation, no thyromegaly   Skin: warm and dry, not jaundiced   Cardiovascular: regular rhythm and rate, no murmurs auscultated   Pulm: clear to auscultation bilaterally, regular and unlabored   Abdomen: soft, generalized tender, nondistended; normal bowel sounds, abdominal binder in place   Rectal: deferred   Extremities: no rash or edema   Neurologic: Normal mood and behavior    All Medications Have Been Reviewed     Results Review:       Results from last 7 days  Lab Units 03/08/17  0659 03/07/17  0600 03/06/17  0802   WBC 10*3/mm3 14.92* 14.35* 11.58*   HEMOGLOBIN g/dL 10.2* 9.4* 10.1*   HEMATOCRIT % 31.7* 29.1* 30.7*   PLATELETS 10*3/mm3 391 348 366         Results from last 7 days  Lab Units 03/08/17  0714 03/07/17  0600 03/06/17  0802 03/05/17  0551   SODIUM mmol/L 139 138 138 136   POTASSIUM mmol/L 4.3 4.9 4.7 4.5   CHLORIDE mmol/L 102 100 99 98   TOTAL CO2 mmol/L 21.1* 21.5* 25.8 25.0   BUN mg/dL 81* 83* 86* 98*   CREATININE mg/dL 3.12* 3.43* 3.62* 4.37*   CALCIUM mg/dL 11.0* 11.9* 12.0* 11.7*   BILIRUBIN mg/dL 0.3 0.2  --  0.2   ALK PHOS U/L 343* 249*  --  171*   ALT (SGPT) U/L 102* 100*  --   95*   AST (SGOT) U/L 86* 94*  --  106*   GLUCOSE mg/dL 67 81 71 73       0  Lab Value Date/Time   LIPASE 95 (H) 03/08/2017 0714   LIPASE 534 (H) 03/07/2017 0600     '      Results from last 7 days  Lab Units 03/04/17  0612   INR  1.01       RADIOLOGY:    Imaging Results (last 72 hours)     Procedure Component Value Units Date/Time    CT Abdomen Pelvis Without Contrast [[id number]] Collected:  03/06/17 1617     Updated:  03/06/17 1710    Narrative:       CT ABDOMEN AND PELVIS WITHOUT CONTRAST     HISTORY: Abdominal pain, nausea, abnormal LFTs.     TECHNIQUE: Spiral axial CT imaging of the abdomen and pelvis was  performed at 3 mm intervals throughout. No intravenous contrast was  administered for this imaging.     COMPARISON: A previous noncontrasted CT abdomen and pelvis 03/31/2013 is  reviewed.     FINDINGS: Liver demonstrates mild diffuse increased attenuation,  consistent with known amiodarone therapy. This has clearly progressed  when compared with imaging of 2013. No focal hepatic lesions are  identified. There has been prior cholecystectomy. There is no biliary  ductal dilatation. Spleen is normal in size and configuration.      Pancreatic parenchyma appears grossly unremarkable. There is however  relative edema at the pancreatic head and surrounding fat planes. I  cannot exclude pancreatitis. The adrenal glands and kidneys demonstrate  an unremarkable noncontrast appearance. The remaining colon appears  unremarkable. No wall thickening or inflammatory changes are identified.  There appears to have been a right hemicolectomy. There has been a  previous ventral hernia repair with mesh in place. There is a recurrent  hernia just to the right of the mesh at the mid abdomen containing  mesenteric fat and several small bowel loops. There is no bowel  obstruction or evidence for strangulation. This hernia appears stable  from previous imaging. Stomach is unremarkable. There is moderate  retained foodstuff, similar to  prior exam. Some element of gastroparesis  should be considered. Bladder is decompressed by Manning catheter. There  is mild diffuse third-spacing of fluid throughout the abdominal wall.  There is no free intraperitoneal fluid. There is no free air. Visualized  lung bases demonstrate areas of patchy infiltrate superimposed on  chronic background interstitial change. There are trace bilateral  pleural effusions.       Impression:       The head of the pancreas appears edematous and there is  stranding in the adjacent retroperitoneal fat. I cannot exclude  pancreatitis. Correlation with serum amylase and lipase is recommended.  No further potential acute intraabdominal or pelvic process is present.  Other findings are delineated above.     This report was finalized on 3/6/2017 5:07 PM by Dr. Alfie Weston MD.       US Gallbladder [[id number]] Collected:  03/08/17 0637     Updated:  03/08/17 0644    Narrative:       RIGHT UPPER QUADRANT ULTRASOUND     CLINICAL HISTORY:  Female who is 89 years-old, with a history of acute  pancreatitis     FINDINGS:  1. The right kidney is normal measuring 9.2 x 5.0 x 4.5 cm.  2. The liver is normal measuring 14 cm.  3. Gallbladder is surgically absent. Common bile duct measures 12 mm,  upper normal.  4. Visualized pancreas is normal however the pancreatic duct is dilated  to 5 mm.       Impression:       1. Limited imaging due to patient's pain and limited pressure with  transducer.  2. Dilated pancreatic duct, previous cholecystectomy.     This report was finalized on 3/8/2017 6:41 AM by Dr. Atul Hood MD.             Assessment/Plan     Patient Active Problem List   Diagnosis Code   • Atrial fibrillation I48.91   • Fall W19.XXXA   • Chronic kidney disease, stage IV (severe) N18.4   • Left bundle branch block (LBBB) I44.7   • Acute respiratory failure with hypoxia J96.01   • Hypertension I10   • COPD (chronic obstructive pulmonary disease) J44.9   • Acute kidney injury N17.9   •  Weakness R53.1   • UTI (urinary tract infection) N39.0   • Abdominal wall hernia K43.9   • Clostridium difficile colitis A04.7   • Thrush B37.0   • HAP (hospital-acquired pneumonia) J18.9   • Influenza A J10.1   • Acute on chronic renal failure N17.9, N18.9   • Pneumonia due to infectious organism J18.9   • Sepsis A41.9   • Idiopathic acute pancreatitis K85.00     Heather Warner, APRN  03/08/17  9:12 AM    We are following for acute pancreatitis in setting of sepsis and multiple medical issues.  Pt is currently resting comfortably.  Abdominal pain is stable.    Impression:  1. Acute pancreatitis - etiology unclear. multiple abx during course of her hospitalizaitons. Etiology likely multifactorial related to her multiple underlying illnessess/sepsis. Lipase improving  - US with mild dilation of pancreatic duct, CBC 12mm post florin. Lipid panel neg. No ETOH  -continue gut rest, IVFs and pain management per primary team  2. Elevated liver enzymes - ? 2/2 sepsis vs DILI. Continue to monitor.  Check hep panel.  Consider MRCP when renal function amenable.  If her Cr does not significantly improve can consider MRCP w/o gadolinium.    3. Pneumonia - per primary team

## 2017-03-08 NOTE — PROGRESS NOTES
"   LOS: 5 days   Patient Care Team:  Catrina Santiago MD as PCP - General (Internal Medicine)    Chief Complaint/ Reason for encounter: Acute renal failure, hypercalcemia  Chief Complaint   Patient presents with   • Weakness - Generalized   • Female Dysuria         Subjective     Weakness - Generalized   Associated symptoms include weakness. Pertinent negatives include no chest pain, fever or nausea.   Female Dysuria    Pertinent negatives include no nausea.       Subjective:  Symptoms:  Stable.  She reports malaise and weakness.  No shortness of breath or chest pain.  (Still seems to be in considerable amount of pain today  Multiple somatic complaints: Complain of pain in her right arm at the side of her IV, leg pain, abdominal pain and some low back pain).    Diet:  Poor intake.  No nausea.    Activity level: Impaired due to weakness.    Pain:  She complains of pain that is mild.  She reports pain is unchanged.  Pain is poorly controlled and requiring pain medication.          History taken from: Patient and chart    Objective     Vital Signs  Temp:  [96.9 °F (36.1 °C)-97.7 °F (36.5 °C)] 97.7 °F (36.5 °C)  Heart Rate:  [67-94] 69  Resp:  [18] 18  BP: (116-135)/(62-78) 131/62       Wt Readings from Last 1 Encounters:   03/08/17 0300 145 lb (65.8 kg)   03/07/17 0500 146 lb (66.2 kg)   03/06/17 0600 120 lb (54.4 kg)   03/03/17 1756 118 lb (53.5 kg)       Objective:  General Appearance:  Ill-appearing, in no acute distress, uncomfortable and in pain (Chronically ill-appearing).    Vital signs: (most recent): Blood pressure 131/62, pulse 69, temperature 97.7 °F (36.5 °C), temperature source Oral, resp. rate 18, height 60\" (152.4 cm), weight 145 lb (65.8 kg), SpO2 99 %.  Vital signs are normal.  No fever.    Output: Producing urine.    HEENT: Normal HEENT exam.    Lungs:  Normal respiratory rate.  She is not in respiratory distress.  There are decreased breath sounds.  No wheezes.    Heart: Normal rate.  Regular rhythm.  " No murmur.   Abdomen: Abdomen is soft and non-distended.  Bowel sounds are normal.   There is no abdominal tenderness.  There is no epigastric area or no suprapubic area tenderness.     Extremities: Normal range of motion.  There is no deformity or dependent edema.    Pulses: Distal pulses are intact.    Neurological: Patient is alert and oriented to person, place and time.    Skin:  Warm and dry.  No rash or cyanosis.             Results Review:    Past Medical History: reviewed and updated  Past Medical History   Diagnosis Date   • Acute respiratory failure with hypoxia    • Arthritis    • Asthma    • Atrial fibrillation    • Cancer      multiple skincancers   • Candidal stomatitis    • CHF (congestive heart failure)    • Chronic kidney disease, stage 4, severely decreased GFR    • COPD (chronic obstructive pulmonary disease)    • Enterocolitis due to Clostridium difficile 12/29/2016     positive specimen collected on 12/29/2016   • History of falling    • History of transfusion      1989   • Hypertension    • Hypothyroidism    • Muscle weakness (generalized)    • Need for assistance with personal care    • Renal disorder    • Weakness          Allergies:  Allergies   Allergen Reactions   • Augmentin [Amoxicillin-Pot Clavulanate]    • Ceftin [Cefuroxime Axetil]    • Ciprofloxacin    • Colchicine    • Garlic    • Hydrocodone    • Rabeprazole    • Tetracyclines & Related        Intake/Output:     Intake/Output Summary (Last 24 hours) at 03/08/17 1322  Last data filed at 03/08/17 1012   Gross per 24 hour   Intake   1050 ml   Output   1150 ml   Net   -100 ml         DATA:  Interval chart, labs and notes reviewed.    Labs:   Recent Results (from the past 24 hour(s))   Calcium, Ionized    Collection Time: 03/08/17  6:59 AM   Result Value Ref Range    Ionized Calcium 1.66 (H) 1.10 - 1.35 mmol/L    Ionized Calcium 6.6 (H) 4.6 - 5.4 mg/dL   CBC Auto Differential    Collection Time: 03/08/17  6:59 AM   Result Value Ref  Range    WBC 14.92 (H) 4.50 - 10.70 10*3/mm3    RBC 3.41 (L) 3.90 - 5.20 10*6/mm3    Hemoglobin 10.2 (L) 11.9 - 15.5 g/dL    Hematocrit 31.7 (L) 35.6 - 45.5 %    MCV 93.0 80.5 - 98.2 fL    MCH 29.9 26.9 - 32.0 pg    MCHC 32.2 (L) 32.4 - 36.3 g/dL    RDW 15.3 (H) 11.7 - 13.0 %    RDW-SD 51.9 37.0 - 54.0 fl    MPV 11.0 6.0 - 12.0 fL    Platelets 391 140 - 500 10*3/mm3    Neutrophil % 77.3 (H) 42.7 - 76.0 %    Lymphocyte % 8.5 (L) 19.6 - 45.3 %    Monocyte % 9.1 5.0 - 12.0 %    Eosinophil % 4.4 0.3 - 6.2 %    Basophil % 0.4 0.0 - 1.5 %    Immature Grans % 0.3 0.0 - 0.5 %    Neutrophils, Absolute 11.53 (H) 1.90 - 8.10 10*3/mm3    Lymphocytes, Absolute 1.27 0.90 - 4.80 10*3/mm3    Monocytes, Absolute 1.36 (H) 0.20 - 1.20 10*3/mm3    Eosinophils, Absolute 0.65 0.00 - 0.70 10*3/mm3    Basophils, Absolute 0.06 0.00 - 0.20 10*3/mm3    Immature Grans, Absolute 0.05 (H) 0.00 - 0.03 10*3/mm3    nRBC 0.2 (H) 0.0 - 0.0 /100 WBC   Scan Slide    Collection Time: 03/08/17  6:59 AM   Result Value Ref Range    RBC Morphology Normal Normal    WBC Morphology Normal Normal    Clumped Platelets Present None Seen   Comprehensive Metabolic Panel    Collection Time: 03/08/17  7:14 AM   Result Value Ref Range    Glucose 67 65 - 99 mg/dL    BUN 81 (H) 8 - 23 mg/dL    Creatinine 3.12 (H) 0.57 - 1.00 mg/dL    Sodium 139 136 - 145 mmol/L    Potassium 4.3 3.5 - 5.2 mmol/L    Chloride 102 98 - 107 mmol/L    CO2 21.1 (L) 22.0 - 29.0 mmol/L    Calcium 11.0 (H) 8.6 - 10.5 mg/dL    Total Protein 5.8 (L) 6.0 - 8.5 g/dL    Albumin 2.70 (L) 3.50 - 5.20 g/dL    ALT (SGPT) 102 (H) 1 - 33 U/L    AST (SGOT) 86 (H) 1 - 32 U/L    Alkaline Phosphatase 343 (H) 39 - 117 U/L    Total Bilirubin 0.3 0.1 - 1.2 mg/dL    eGFR Non African Amer 14 (L) >60 mL/min/1.73    Globulin 3.1 gm/dL    A/G Ratio 0.9 g/dL    BUN/Creatinine Ratio 26.0 (H) 7.0 - 25.0    Anion Gap 15.9 mmol/L   Lipase    Collection Time: 03/08/17  7:14 AM   Result Value Ref Range    Lipase 95 (H) 13 -  60 U/L   Lipid Panel    Collection Time: 03/08/17  7:14 AM   Result Value Ref Range    Total Cholesterol 120 0 - 200 mg/dL    Triglycerides 64 0 - 150 mg/dL    HDL Cholesterol 44 40 - 60 mg/dL    LDL Cholesterol  63 0 - 100 mg/dL    VLDL Cholesterol 12.8 5 - 40 mg/dL    LDL/HDL Ratio 1.44    Phosphorus    Collection Time: 03/08/17  7:14 AM   Result Value Ref Range    Phosphorus 5.4 (H) 2.5 - 4.5 mg/dL   Troponin    Collection Time: 03/08/17  7:14 AM   Result Value Ref Range    Troponin T 0.070 (H) 0.000 - 0.030 ng/mL       Radiology: Renal ultrasound personally reviewed, no obstruction, small kidneys consistent with advanced chronic kidney disease  Old records personally reviewed, baseline creatinine 2.1, stage IV chronic kidney disease  The above labs personally reviewed by me  CT and pelvis personally reviewed showing pancreatic edema, possibly acute pancreatitis  High risk med: zometa, requires close monitoring of calcium levels after administration       Medications have been reviewed:  Current Facility-Administered Medications   Medication Dose Route Frequency Provider Last Rate Last Dose   • acetaminophen (TYLENOL) tablet 650 mg  650 mg Oral Q4H PRN Oleksandr Keyes MD       • amiodarone (PACERONE) tablet 200 mg  200 mg Oral BID Oleksandr Keyes MD   200 mg at 03/07/17 1711   • bumetanide (BUMEX) injection 1 mg  1 mg Intravenous BID Filipe Feldman MD   1 mg at 03/08/17 0900   • diltiaZEM CD (CARDIZEM CD) 24 hr capsule 120 mg  120 mg Oral Q24H Oleksandr Keyes MD   120 mg at 03/07/17 0915   • enoxaparin (LOVENOX) syringe 30 mg  30 mg Subcutaneous Daily Oleksandr Keyes MD   30 mg at 03/07/17 0915   • fluticasone (FLONASE) 50 MCG/ACT nasal spray 2 spray  2 spray Nasal Daily Oleksandr Keyes MD   2 spray at 03/07/17 0915   • Glycerin-Hypromellose- (ARTIFICIAL TEARS) 0.2-0.2-1 % ophthalmic solution solution 1 drop  1 drop Both Eyes Q1H PRN Oleksandr Keyes MD       • hydrALAZINE  (APRESOLINE) tablet 75 mg  75 mg Oral TID Oleksandr Keyes MD   75 mg at 03/07/17 2034   • HYDROmorphone (DILAUDID) injection 0.5 mg  0.5 mg Intravenous Q2H PRN Oleksandr Keyes MD   0.5 mg at 03/08/17 0251    And   • naloxone (NARCAN) injection 0.4 mg  0.4 mg Intravenous Q5 Min PRN Oleksandr Keyes MD       • ipratropium-albuterol (DUO-NEB) nebulizer solution 3 mL  3 mL Nebulization Q4H PRN Oleksandr Keyes MD       • levothyroxine (SYNTHROID, LEVOTHROID) tablet 75 mcg  75 mcg Oral Q AM Oleksandr Keyes MD   75 mcg at 03/08/17 0632   • nitroglycerin (NITROSTAT) SL tablet 0.4 mg  0.4 mg Sublingual Q5 Min PRN Oleksandr Keyes MD       • ondansetron (ZOFRAN) tablet 4 mg  4 mg Oral Q6H PRN Oleksandr Keyes MD        Or   • ondansetron ODT (ZOFRAN-ODT) disintegrating tablet 4 mg  4 mg Oral Q6H PRN Oleksandr Keyes MD        Or   • ondansetron (ZOFRAN) injection 4 mg  4 mg Intravenous Q6H PRN Oleksandr Keyes MD   4 mg at 03/06/17 0948   • oxyCODONE-acetaminophen (PERCOCET) 5-325 MG per tablet 1 tablet  1 tablet Oral Q4H PRN Oleksandr Keyes MD   1 tablet at 03/07/17 1648   • phenazopyridine (PYRIDIUM) tablet 100 mg  100 mg Oral Every Other Day Jocelyn Khan MD   100 mg at 03/06/17 0930   • saccharomyces boulardii (FLORASTOR) capsule 250 mg  250 mg Oral BID Miguel Demarco MD   250 mg at 03/07/17 1712   • sennosides-docusate sodium (SENOKOT-S) 8.6-50 MG tablet 2 tablet  2 tablet Oral Nightly PRN Oleksandr Keyes MD       • sodium chloride 0.9 % flush 1-10 mL  1-10 mL Intravenous PRN Oleksandr Keyes MD       • sodium chloride 0.9 % flush 10 mL  10 mL Intravenous PRN Sanju Sharma MD       • sodium chloride 0.9 % infusion  125 mL/hr Intravenous Continuous Filipe Feldman  mL/hr at 03/07/17 2044 125 mL/hr at 03/07/17 2044       Assessment/Plan     Principal Problem:    Sepsis  Active Problems:    Atrial fibrillation    Left bundle branch block (LBBB)    Acute  respiratory failure with hypoxia    COPD (chronic obstructive pulmonary disease)    UTI (urinary tract infection)    Acute on chronic renal failure    Pneumonia due to infectious organism    Idiopathic acute pancreatitis      Assessment:  (Acute renal failure, slowly improving with IV fluids, likely secondary to dehydration and hypercalcemia/ ATN  Hypercalcemia, no clear etiology, slowly improving status post Zometa  Chronic kidney disease stage IV   acute pancreatitis, possibly related to hypercalcemia   dehydration improved, discontinue IV fluids  Urinary tract infection  Pneumonia, on IV antibiotics   sepsis syndrome improving  COPD  Acute hypoxemic respiratory failure    Plan:  Renal function and calcium level are marginally improved today  Received Zometa 30 mg dose yesterday, tolerated well  Will discontinue IV fluids/IV Bumex at this time, concerned about possible fluid overload which was a major issue during her previous hospitalization  Poor prognosis).             Continue to monitor renal function, electroytes and volume closely   Please call me with any questions or concerns      Filipe Feldman MD   [facility]   [phone]    03/08/17  1:22 PM        EMR Dragon/Transcription disclaimer:    Much of this encounter note is an electronic transcription/translation of spoken language to printed text. The electronic translation of spoken language may permit erroneous, or at times, nonsensical words or phrases to be inadvertently transcribed; Although I have reviewed the note for such errors, some may still exist.

## 2017-03-09 PROBLEM — K85.90 ACUTE PANCREATITIS: Status: ACTIVE | Noted: 2017-01-01

## 2017-03-09 NOTE — PROGRESS NOTES
Continued Stay Note  [facility]     Patient Name: Clementina Betancur  MRN: [id number]  Today's Date: 3/9/2017    Admit Date: 3/3/2017          Discharge Plan       03/09/17 1054    Case Management/Social Work Plan    Plan Home with [facility]    Patient/Family In Agreement With Plan yes    Additional Comments S/W daughter, Calista Skinner over the telephone and discussed d/c plans to see if they had changed.  Calista states that her mother is sicker than usual, but wants to take her home with [facility].  Salma, RN, CCP              Discharge Codes     None            Minna Burkett RN

## 2017-03-09 NOTE — PLAN OF CARE
Problem: Patient Care Overview (Adult)  Goal: Plan of Care Review  Outcome: Ongoing (interventions implemented as appropriate)    03/08/17 1403   Coping/Psychosocial Response Interventions   Plan Of Care Reviewed With patient   Patient Care Overview   Progress improving   Outcome Evaluation   Outcome Summary/Follow up Plan Patient continuously moans and whines with no specific problem noted. Medicated PRN for pain throughout the shift. Abdominal binder in place R/T hernia. Will continue to monitor.       Goal: Adult Individualization and Mutuality  Outcome: Ongoing (interventions implemented as appropriate)  Goal: Discharge Needs Assessment  Outcome: Ongoing (interventions implemented as appropriate)    Problem: Renal Failure/Kidney Injury, Acute (Adult)  Goal: Signs and Symptoms of Listed Potential Problems Will be Absent or Manageable (Renal Failure/Kidney Injury, Acute)  Outcome: Ongoing (interventions implemented as appropriate)    Problem: Pain, Acute (Adult)  Goal: Acceptable Pain Control/Comfort Level  Outcome: Ongoing (interventions implemented as appropriate)    Problem: Fall Risk (Adult)  Goal: Absence of Falls  Outcome: Ongoing (interventions implemented as appropriate)    Problem: Skin Integrity Impairment, Risk/Actual (Adult)  Goal: Skin Integrity/Wound Healing  Outcome: Ongoing (interventions implemented as appropriate)

## 2017-03-09 NOTE — PROGRESS NOTES
"   LOS: 6 days   Patient Care Team:  Catrina Santiago MD as PCP - General (Internal Medicine)    Chief Complaint/ Reason for encounter: Acute renal failure, hypercalcemia  Chief Complaint   Patient presents with   • Weakness - Generalized   • Female Dysuria         Subjective     Weakness - Generalized   Associated symptoms include weakness. Pertinent negatives include no chest pain, fever or nausea.   Female Dysuria    Pertinent negatives include no nausea.       Subjective:  Symptoms:  Worsening.  She reports malaise and weakness.  No shortness of breath or chest pain.  (Discontinue doing very well  She was resting on occasion to see her but I will woke up clear exam she complained of \"pain throughout my whole body\"  Continues to complain of abdominal pain and bilateral leg pain  Lower extremity edema is about the same today  She remains nothing by mouth  ).    Diet:  NPO.  No nausea.    Activity level: Impaired due to weakness.    Pain:  She complains of pain that is mild.  She reports pain is unchanged.  Pain is poorly controlled and requiring pain medication.          History taken from: Patient and chart    Objective     Vital Signs  Temp:  [97.7 °F (36.5 °C)-99.7 °F (37.6 °C)] 98 °F (36.7 °C)  Heart Rate:  [66-80] 66  Resp:  [16-20] 18  BP: (113-134)/(49-83) 133/66       Wt Readings from Last 1 Encounters:   03/09/17 0601 141 lb 12.8 oz (64.3 kg)   03/08/17 0300 145 lb (65.8 kg)   03/07/17 0500 146 lb (66.2 kg)   03/06/17 0600 120 lb (54.4 kg)   03/03/17 1756 118 lb (53.5 kg)       Objective:  General Appearance:  Ill-appearing, uncomfortable and in pain (Chronically ill-appearing).    Vital signs: (most recent): Blood pressure 133/66, pulse 66, temperature 98 °F (36.7 °C), temperature source Oral, resp. rate 18, height 60\" (152.4 cm), weight 141 lb 12.8 oz (64.3 kg), SpO2 97 %.  Vital signs are normal.  No fever.    Output: Producing urine.    HEENT: Normal HEENT exam.    Lungs:  Normal respiratory rate.  She " is not in respiratory distress.  There are decreased breath sounds.  No wheezes.    Heart: Normal rate.  Regular rhythm.  No murmur.   Abdomen: Abdomen is soft and non-distended.  Bowel sounds are normal.   There is epigastric tenderness. There is no suprapubic area tenderness.     Extremities: Normal range of motion.  There is dependent edema.  There is no deformity.    Pulses: Distal pulses are intact.    Neurological: Patient is alert and oriented to person, place and time.    Skin:  Warm and dry.  No rash or cyanosis.             Results Review:    Past Medical History: reviewed and updated  Past Medical History   Diagnosis Date   • Acute respiratory failure with hypoxia    • Arthritis    • Asthma    • Atrial fibrillation    • Cancer      multiple skincancers   • Candidal stomatitis    • CHF (congestive heart failure)    • Chronic kidney disease, stage 4, severely decreased GFR    • COPD (chronic obstructive pulmonary disease)    • Enterocolitis due to Clostridium difficile 12/29/2016     positive specimen collected on 12/29/2016   • History of falling    • History of transfusion      1989   • Hypertension    • Hypothyroidism    • Muscle weakness (generalized)    • Need for assistance with personal care    • Renal disorder    • Weakness          Allergies:  Allergies   Allergen Reactions   • Augmentin [Amoxicillin-Pot Clavulanate]    • Ceftin [Cefuroxime Axetil]    • Ciprofloxacin    • Colchicine    • Garlic    • Hydrocodone    • Rabeprazole    • Tetracyclines & Related        Intake/Output:     Intake/Output Summary (Last 24 hours) at 03/09/17 1544  Last data filed at 03/09/17 0400   Gross per 24 hour   Intake     60 ml   Output   1075 ml   Net  -1015 ml         DATA:  Interval chart, labs and notes reviewed.    Labs:   Recent Results (from the past 24 hour(s))   Comprehensive Metabolic Panel    Collection Time: 03/09/17  7:37 AM   Result Value Ref Range    Glucose 64 (L) 65 - 99 mg/dL    BUN 84 (H) 8 - 23  mg/dL    Creatinine 3.13 (H) 0.57 - 1.00 mg/dL    Sodium 138 136 - 145 mmol/L    Potassium 4.3 3.5 - 5.2 mmol/L    Chloride 101 98 - 107 mmol/L    CO2 19.0 (L) 22.0 - 29.0 mmol/L    Calcium 9.3 8.6 - 10.5 mg/dL    Total Protein 5.0 (L) 6.0 - 8.5 g/dL    Albumin 2.50 (L) 3.50 - 5.20 g/dL    ALT (SGPT) 130 (H) 1 - 33 U/L    AST (SGOT) 154 (H) 1 - 32 U/L    Alkaline Phosphatase 352 (H) 39 - 117 U/L    Total Bilirubin 0.2 0.1 - 1.2 mg/dL    eGFR Non African Amer 14 (L) >60 mL/min/1.73    Globulin 2.5 gm/dL    A/G Ratio 1.0 g/dL    BUN/Creatinine Ratio 26.8 (H) 7.0 - 25.0    Anion Gap 18.0 mmol/L   Lipase    Collection Time: 03/09/17  7:37 AM   Result Value Ref Range    Lipase 51 13 - 60 U/L   Calcium, Ionized    Collection Time: 03/09/17  7:37 AM   Result Value Ref Range    Ionized Calcium 1.41 (H) 1.10 - 1.35 mmol/L    Ionized Calcium 5.6 (H) 4.6 - 5.4 mg/dL   CBC Auto Differential    Collection Time: 03/09/17  7:37 AM   Result Value Ref Range    WBC 13.32 (H) 4.50 - 10.70 10*3/mm3    RBC 2.95 (L) 3.90 - 5.20 10*6/mm3    Hemoglobin 9.1 (L) 11.9 - 15.5 g/dL    Hematocrit 27.7 (L) 35.6 - 45.5 %    MCV 93.9 80.5 - 98.2 fL    MCH 30.8 26.9 - 32.0 pg    MCHC 32.9 32.4 - 36.3 g/dL    RDW 14.5 (H) 11.7 - 13.0 %    RDW-SD 49.6 37.0 - 54.0 fl    MPV 10.7 6.0 - 12.0 fL    Platelets 346 140 - 500 10*3/mm3    Neutrophil % 78.1 (H) 42.7 - 76.0 %    Lymphocyte % 5.5 (L) 19.6 - 45.3 %    Monocyte % 14.9 (H) 5.0 - 12.0 %    Eosinophil % 0.9 0.3 - 6.2 %    Basophil % 0.4 0.0 - 1.5 %    Immature Grans % 0.2 0.0 - 0.5 %    Neutrophils, Absolute 10.42 (H) 1.90 - 8.10 10*3/mm3    Lymphocytes, Absolute 0.73 (L) 0.90 - 4.80 10*3/mm3    Monocytes, Absolute 1.98 (H) 0.20 - 1.20 10*3/mm3    Eosinophils, Absolute 0.12 0.00 - 0.70 10*3/mm3    Basophils, Absolute 0.05 0.00 - 0.20 10*3/mm3    Immature Grans, Absolute 0.02 0.00 - 0.03 10*3/mm3   Phosphorus    Collection Time: 03/09/17  7:37 AM   Result Value Ref Range    Phosphorus 5.1 (H) 2.5 - 4.5  mg/dL   Hepatitis Panel, Acute    Collection Time: 03/09/17  7:37 AM   Result Value Ref Range    Hepatitis B Surface Ag Non-Reactive Non-Reactive    Hep A IgM Non-Reactive Non-Reactive    Hep B C IgM Non-Reactive Non-Reactive    Hepatitis C Ab Non-Reactive Non-Reactive       Radiology: Renal ultrasound personally reviewed, no obstruction, small kidneys consistent with advanced chronic kidney disease  Old records personally reviewed, baseline creatinine 2.1, stage IV chronic kidney disease  The above labs personally reviewed by me  CT and pelvis personally reviewed showing pancreatic edema, possibly acute pancreatitis  High risk med: zometa, requires close monitoring of calcium levels after administration       Medications have been reviewed:  Current Facility-Administered Medications   Medication Dose Route Frequency Provider Last Rate Last Dose   • acetaminophen (TYLENOL) tablet 650 mg  650 mg Oral Q4H PRN Oleksandr Keyes MD       • amiodarone (PACERONE) tablet 200 mg  200 mg Oral BID Oleksandr Keyes MD   200 mg at 03/09/17 1012   • bumetanide (BUMEX) injection 1 mg  1 mg Intravenous BID Filipe Feldman MD   1 mg at 03/09/17 1012   • diltiaZEM CD (CARDIZEM CD) 24 hr capsule 120 mg  120 mg Oral Q24H Oleksandr Keyes MD   120 mg at 03/09/17 1024   • enoxaparin (LOVENOX) syringe 30 mg  30 mg Subcutaneous Daily Oleksandr Keyes MD   30 mg at 03/09/17 1024   • fluticasone (FLONASE) 50 MCG/ACT nasal spray 2 spray  2 spray Nasal Daily Oleksandr Keyes MD   2 spray at 03/09/17 1025   • Glycerin-Hypromellose- (ARTIFICIAL TEARS) 0.2-0.2-1 % ophthalmic solution solution 1 drop  1 drop Both Eyes Q1H PRN Oleksandr Keyes MD       • hydrALAZINE (APRESOLINE) tablet 75 mg  75 mg Oral TID Oleksandr Keyes MD   75 mg at 03/09/17 1012   • HYDROmorphone (DILAUDID) injection 0.5 mg  0.5 mg Intravenous Q2H PRN Oleksandr Keyes MD   0.5 mg at 03/09/17 1025    And   • naloxone (NARCAN) injection 0.4 mg  0.4  mg Intravenous Q5 Min PRN Oleksandr Keyes MD       • ipratropium-albuterol (DUO-NEB) nebulizer solution 3 mL  3 mL Nebulization Q4H PRN Oleksandr Keyes MD       • levothyroxine (SYNTHROID, LEVOTHROID) tablet 75 mcg  75 mcg Oral Q AM Oleksandr Keyes MD   75 mcg at 03/09/17 0531   • nitroglycerin (NITROSTAT) SL tablet 0.4 mg  0.4 mg Sublingual Q5 Min PRN Oleksandr Keyes MD       • ondansetron (ZOFRAN) tablet 4 mg  4 mg Oral Q6H PRN Oleksandr Keyes MD        Or   • ondansetron ODT (ZOFRAN-ODT) disintegrating tablet 4 mg  4 mg Oral Q6H PRN Oleksandr Keyes MD        Or   • ondansetron (ZOFRAN) injection 4 mg  4 mg Intravenous Q6H PRN Oleksandr Keyes MD   4 mg at 03/06/17 0948   • oxyCODONE-acetaminophen (PERCOCET) 5-325 MG per tablet 1 tablet  1 tablet Oral Q4H PRN Oleksandr Keyes MD   1 tablet at 03/09/17 1303   • saccharomyces boulardii (FLORASTOR) capsule 250 mg  250 mg Oral BID Miguel Demarco MD   250 mg at 03/09/17 1012   • sennosides-docusate sodium (SENOKOT-S) 8.6-50 MG tablet 2 tablet  2 tablet Oral Nightly PRN Oleksandr Keyes MD       • sodium chloride 0.9 % flush 1-10 mL  1-10 mL Intravenous PRN Oleksandr Keyes MD       • sodium chloride 0.9 % flush 10 mL  10 mL Intravenous PRN Sanju Sharma MD           Assessment/Plan     Principal Problem:    Sepsis  Active Problems:    Atrial fibrillation    Left bundle branch block (LBBB)    Acute respiratory failure with hypoxia    COPD (chronic obstructive pulmonary disease)    UTI (urinary tract infection)    Acute on chronic renal failure    Pneumonia due to infectious organism    Idiopathic acute pancreatitis      Assessment:  (Acute renal failure, marginally better today.  Creatinine 3.13  Hypercalcemia, no clear etiology, slowly improving status post Zometa  Chronic kidney disease stage IV , prior baseline creatinine around 2.5  acute pancreatitis, possibly related to hypercalcemia   dehydration improved, discontinued IV  fluids  Urinary tract infection  Pneumonia, on IV antibiotics   sepsis syndrome improving  COPD  Acute hypoxemic respiratory failure    Plan:  Calcium is improved quite a bit today, renal function very marginally improved  Remains off IV fluids secondary to volume overload and congestive heart failure  Bumex times one dose today  Continue to monitor electrolytes and volume closely  With multiple infections and organ system failure and her age, her prognosis is quite poor  Given her significant pain and discomfort might need to start thinking about palliative care    ).             Continue to monitor renal function, electroytes and volume closely   Please call me with any questions or concerns      Filipe Feldman MD   [facility]   [phone]    03/09/17  3:44 PM        EMR Dragon/Transcription disclaimer:    Much of this encounter note is an electronic transcription/translation of spoken language to printed text. The electronic translation of spoken language may permit erroneous, or at times, nonsensical words or phrases to be inadvertently transcribed; Although I have reviewed the note for such errors, some may still exist.

## 2017-03-09 NOTE — PLAN OF CARE
Problem: Patient Care Overview (Adult)  Goal: Plan of Care Review  Outcome: Ongoing (interventions implemented as appropriate)    03/09/17 0346   Coping/Psychosocial Response Interventions   Plan Of Care Reviewed With patient   Patient Care Overview   Progress improving   Outcome Evaluation   Outcome Summary/Follow up Plan pt continues to moan and whine throughout shift. medicated as needed for pain. abdominal binder in place r/t hernia. no signs of acute distress noted. will continue to monitor pt.        Goal: Adult Individualization and Mutuality  Outcome: Ongoing (interventions implemented as appropriate)  Goal: Discharge Needs Assessment  Outcome: Ongoing (interventions implemented as appropriate)    Problem: Renal Failure/Kidney Injury, Acute (Adult)  Goal: Signs and Symptoms of Listed Potential Problems Will be Absent or Manageable (Renal Failure/Kidney Injury, Acute)  Outcome: Ongoing (interventions implemented as appropriate)    Problem: Pain, Acute (Adult)  Goal: Acceptable Pain Control/Comfort Level  Outcome: Ongoing (interventions implemented as appropriate)    Problem: Fall Risk (Adult)  Goal: Absence of Falls  Outcome: Ongoing (interventions implemented as appropriate)    Problem: Skin Integrity Impairment, Risk/Actual (Adult)  Goal: Skin Integrity/Wound Healing  Outcome: Ongoing (interventions implemented as appropriate)

## 2017-03-09 NOTE — PROGRESS NOTES
"[facility]               ASSOCIATES     LOS: 6 days     Name: Clementina Betancur  Age: 89 y.o.  Sex: female  :  1927  MRN: [id number]         Primary Care Physician: Catrina Santiago MD    Subjective   Complains of abdominal pain. Short of breath. Time: 35 minutes, greater than 50% spent in counseling and coordination of care.    Objective   Temp:  [97.7 °F (36.5 °C)-99.7 °F (37.6 °C)] 98 °F (36.7 °C)  Heart Rate:  [66-80] 66  Resp:  [16-20] 18  BP: (113-134)/(49-83) 133/66  Body mass index is 27.69 kg/(m^2).  NPO Diet Ice Chips, Sips With Meds    Objective:  General Appearance:  In no acute distress, uncomfortable and ill-appearing.    Vital signs: (most recent): Blood pressure 133/66, pulse 66, temperature 98 °F (36.7 °C), temperature source Oral, resp. rate 18, height 60\" (152.4 cm), weight 141 lb 12.8 oz (64.3 kg), SpO2 97 %.    Lungs:  Normal respiratory rate and normal effort.  She is not in respiratory distress.  Breath sounds clear to auscultation.    Heart: Normal rate.  Regular rhythm.    Abdomen: Abdomen is soft.  (Has an abdominal binder)There is generalized tenderness.     Extremities: There is dependent edema (1+).    Neurological: Patient is alert and oriented to person, place and time.    Skin:  Warm and dry.          Results Review:    Reviewed medications and new clinical results    amiodarone 200 mg Oral BID   bumetanide 1 mg Intravenous BID   bumetanide 1 mg Intravenous Once   diltiaZEM  mg Oral Q24H   enoxaparin 30 mg Subcutaneous Daily   fluticasone 2 spray Nasal Daily   hydrALAZINE 75 mg Oral TID   levothyroxine 75 mcg Oral Q AM   saccharomyces boulardii 250 mg Oral BID      Results from last 7 days  Lab Units 17  0737 17  0659 17  0600 17  0802 17  0551 17  0612 17  1846   WBC 10*3/mm3 13.32* 14.92* 14.35* 11.58* 12.70* 12.47* 12.92*   HEMOGLOBIN g/dL 9.1* 10.2* 9.4* 10.1* 10.4* 10.2* 10.8*   PLATELETS " 10*3/mm3 346 391 348 366 371 367 438     Results from last 7 days  Lab Units 03/09/17  0737 03/08/17  0714 03/07/17  0600 03/06/17  0802 03/05/17  0551 03/04/17  0612 03/03/17  1846   SODIUM mmol/L 138 139 138 138 136 135* 134*   POTASSIUM mmol/L 4.3 4.3 4.9 4.7 4.5 5.0 5.6*   CHLORIDE mmol/L 101 102 100 99 98 94* 90*   TOTAL CO2 mmol/L 19.0* 21.1* 21.5* 25.8 25.0 26.3 29.0   BUN mg/dL 84* 81* 83* 86* 98* 106* 100*   CREATININE mg/dL 3.13* 3.12* 3.43* 3.62* 4.37* 4.82* 5.11*   CALCIUM mg/dL 9.3 11.0* 11.9* 12.0* 11.7* 11.9* 13.8*   GLUCOSE mg/dL 64* 67 81 71 73 82 100*     Results from last 7 days  Lab Units 03/07/17  0600   AMYLASE U/L 134*     0  Lab Value Date/Time   LIPASE 51 03/09/2017 0737   LIPASE 95 (H) 03/08/2017 0714   LIPASE 534 (H) 03/07/2017 0600       Results from last 7 days  Lab Units 03/09/17  0737 03/08/17  0714 03/07/17  0600   ALK PHOS U/L 352* 343* 249*   BILIRUBIN mg/dL 0.2 0.3 0.2   BILIRUBIN DIRECT mg/dL  --   --  <0.2   ALT (SGPT) U/L 130* 102* 100*   AST (SGOT) U/L 154* 86* 94*     Estimated Creatinine Clearance: 10.2 mL/min (by C-G formula based on Cr of 3.13).    Assessment/Plan   Active Hospital Problems (** Indicates Principal Problem)    Diagnosis Date Noted   • **Sepsis [A41.9] 03/04/2017   • Idiopathic acute pancreatitis [K85.00] 03/07/2017   • Pneumonia due to infectious organism [J18.9] 03/04/2017   • Acute on chronic renal failure [N17.9, N18.9] 03/03/2017   • UTI (urinary tract infection) [N39.0] 12/28/2016   • Left bundle branch block (LBBB) [I44.7] 12/27/2016   • COPD (chronic obstructive pulmonary disease) [J44.9] 12/27/2016   • Acute respiratory failure with hypoxia [J96.01] 12/27/2016   • Atrial fibrillation [I48.91] 12/26/2016      Resolved Hospital Problems    Diagnosis Date Noted Date Resolved   No resolved problems to display.     · Off IVF due to concern for fluid overload  · Cefepime stopped  · Discussed with patient and daughter goals of care, quality of life,  prognosis (poor), code status, patient's wishes (she wants to die and seems frustrated that she can't). Discussed consideration of palliative care with patient and daughter. Patient undecided. For now will see how she does over next day or two.    Artemio Leblanc MD   03/09/17  5:28 PM

## 2017-03-09 NOTE — PROGRESS NOTES
[facility] INPATIENT PROGRESS NOTE         [address]    3/9/2017      PATIENT IDENTIFICATION:   Name:  Clementina Betancur      MRN:  [id number]     89 y.o.  female             LOS 6    Reason for visit: Follow-up sepsis with pneumonia and hypoxemic respiratory failure      SUBJECTIVE:    Positive nonproductive cough.  Positive for SOA. Very weak.    Objective   OBJECTIVE:    Vital Sign Min/Max for last 24 hours  Temp  Min: 97.7 °F (36.5 °C)  Max: 99.7 °F (37.6 °C)   BP  Min: 113/49  Max: 134/60   Pulse  Min: 71  Max: 80   Resp  Min: 16  Max: 20   SpO2  Min: 92 %  Max: 95 %   Flow (L/min)  Min: 2  Max: 3   Weight  Min: 141 lb 12.8 oz (64.3 kg)  Max: 141 lb 12.8 oz (64.3 kg)                         Body mass index is 27.69 kg/(m^2).    Intake/Output Summary (Last 24 hours) at 03/09/17 1000  Last data filed at 03/09/17 0400   Gross per 24 hour   Intake    120 ml   Output   2075 ml   Net  -1955 ml         Exam:  GEN:  No distress, appears stated age  EYES:   PERRL, anicteric sclera  ENT:    External ears/nose normal, OP clear  NECK:  No adenopathy, midline trachea  LUNGS: Normal chest on inspection, palpation and diminished breath sounds bilaterally with rales bases on auscultation  CV:  Normal S1S2, without murmur  ABD:  Mildly tender, non distended, no hepatosplenomegaly, +BS  EXT:  No edema, cyanosis or clubbing    Scheduled meds:      amiodarone 200 mg Oral BID   bumetanide 1 mg Intravenous BID   diltiaZEM  mg Oral Q24H   enoxaparin 30 mg Subcutaneous Daily   fluticasone 2 spray Nasal Daily   hydrALAZINE 75 mg Oral TID   levothyroxine 75 mcg Oral Q AM   saccharomyces boulardii 250 mg Oral BID     IV meds:                          sodium chloride 125 mL/hr Last Rate: 125 mL/hr (03/08/17 1837)     Data Review:    Results from last 7 days  Lab Units 03/09/17  0737 03/08/17  0714 03/07/17  0600 03/06/17  0802 03/05/17  0551   SODIUM mmol/L 138 139 138 138 136   POTASSIUM mmol/L 4.3 4.3 4.9 4.7  4.5   CHLORIDE mmol/L 101 102 100 99 98   TOTAL CO2 mmol/L 19.0* 21.1* 21.5* 25.8 25.0   BUN mg/dL 84* 81* 83* 86* 98*   CREATININE mg/dL 3.13* 3.12* 3.43* 3.62* 4.37*   GLUCOSE mg/dL 64* 67 81 71 73   CALCIUM mg/dL 9.3 11.0* 11.9* 12.0* 11.7*         Estimated Creatinine Clearance: 10.2 mL/min (by C-G formula based on Cr of 3.13).    Results from last 7 days  Lab Units 03/09/17  0737 03/08/17  0659 03/07/17  0600 03/06/17  0802 03/05/17  0551   WBC 10*3/mm3 13.32* 14.92* 14.35* 11.58* 12.70*   HEMOGLOBIN g/dL 9.1* 10.2* 9.4* 10.1* 10.4*   PLATELETS 10*3/mm3 346 391 348 366 371       Results from last 7 days  Lab Units 03/04/17  0612   INR  1.01       Results from last 7 days  Lab Units 03/09/17  0737 03/08/17  0714 03/07/17  0600 03/05/17  0551 03/04/17  0612   ALT (SGPT) U/L 130* 102* 100* 95* 69*   AST (SGOT) U/L 154* 86* 94* 106* 60*       Microbiology reviewed : Greater than 100,000 colonies pseudomonas in urine         CT chest viewed:  Atypical bilateral infiltrates versus asymmetric vascular congestion.    CT abd lung cuts reveal:  Visualized lung bases demonstrate areas of patchy infiltrate superimposed on  chronic background interstitial change.    Assessment   ASSESSMENT:   Sepsis  Bibasilar patchy pneumonia  COPD  Acute hypoxemia with respiratory failure  UTI: Pseudomonas in culture  Recent severe C. difficile colitis  Acute kidney injury  Acute Pancreatitis   Chest pain  Anasarca      PLAN:  Antibiotics being managed by infectious disease.  Encourage pulmonary toilet.  Looks very frail.  Increasing anasarca on iv fluids at 125cc/hr.  D/C fluids.  Has had overload problems on previous admissions.      Tucker Sim MD  Pulmonary and Critical Care Medicine  [facility], [facility]  3/9/2017    10:00 AM

## 2017-03-09 NOTE — PROGRESS NOTES
BGA/GI Progress Note   Chief Complaint:  pancreatitis    Subjective     Interval History:   Moaning when entering room. Thinks she is dying. Continues to complain of abdominal pain and pain from abdominal binder r/t hernia. Denies nausea or vomiting. Little change from yesterday    History taken from: patient chart RN    Review of Systems:    per HPI    Objective     Vital Signs  Temp:  [97.7 °F (36.5 °C)-99.7 °F (37.6 °C)] 97.7 °F (36.5 °C)  Heart Rate:  [71-80] 71  Resp:  [16-20] 16  BP: (113-134)/(49-83) 119/83  Body mass index is 27.69 kg/(m^2).    Intake/Output Summary (Last 24 hours) at 03/09/17 0840  Last data filed at 03/09/17 0400   Gross per 24 hour   Intake    180 ml   Output   2075 ml   Net  -1895 ml          Physical Exam:   General: patient awake, alert and cooperative, moaning in pain   Eyes: Normal lids and lashes, no scleral icterus, no conjunctival pallor   Neck: supple, normal ROM, no tracheal deviation, no thyromegaly   Skin: warm and dry, not jaundiced   Cardiovascular: regular rhythm and rate, no murmurs auscultated   Pulm: clear to auscultation bilaterally, regular and unlabored   Abdomen: soft, generalized tender, nondistended; normal bowel sounds, abdominal binder in place   Rectal: deferred   Extremities: no rash or edema   Neurologic: Normal mood and behavior    All Medications Have Been Reviewed     Results Review:       Results from last 7 days  Lab Units 03/09/17  0737 03/08/17  0659 03/07/17  0600   WBC 10*3/mm3 13.32* 14.92* 14.35*   HEMOGLOBIN g/dL 9.1* 10.2* 9.4*   HEMATOCRIT % 27.7* 31.7* 29.1*   PLATELETS 10*3/mm3 346 391 348         Results from last 7 days  Lab Units 03/09/17  0737 03/08/17  0714 03/07/17  0600   SODIUM mmol/L 138 139 138   POTASSIUM mmol/L 4.3 4.3 4.9   CHLORIDE mmol/L 101 102 100   TOTAL CO2 mmol/L 19.0* 21.1* 21.5*   BUN mg/dL 84* 81* 83*   CREATININE mg/dL 3.13* 3.12* 3.43*   CALCIUM mg/dL 9.3 11.0* 11.9*   BILIRUBIN mg/dL 0.2 0.3 0.2   ALK PHOS U/L  352* 343* 249*   ALT (SGPT) U/L 130* 102* 100*   AST (SGOT) U/L 154* 86* 94*   GLUCOSE mg/dL 64* 67 81       0  Lab Value Date/Time   LIPASE 51 03/09/2017 0737   LIPASE 95 (H) 03/08/2017 0714   LIPASE 534 (H) 03/07/2017 0600     '      Results from last 7 days  Lab Units 03/04/17  0612   INR  1.01       RADIOLOGY:    Imaging Results (last 72 hours)     Procedure Component Value Units Date/Time    CT Abdomen Pelvis Without Contrast [[id number]] Collected:  03/06/17 1617     Updated:  03/06/17 1710    Narrative:       CT ABDOMEN AND PELVIS WITHOUT CONTRAST     HISTORY: Abdominal pain, nausea, abnormal LFTs.     TECHNIQUE: Spiral axial CT imaging of the abdomen and pelvis was  performed at 3 mm intervals throughout. No intravenous contrast was  administered for this imaging.     COMPARISON: A previous noncontrasted CT abdomen and pelvis 03/31/2013 is  reviewed.     FINDINGS: Liver demonstrates mild diffuse increased attenuation,  consistent with known amiodarone therapy. This has clearly progressed  when compared with imaging of 2013. No focal hepatic lesions are  identified. There has been prior cholecystectomy. There is no biliary  ductal dilatation. Spleen is normal in size and configuration.      Pancreatic parenchyma appears grossly unremarkable. There is however  relative edema at the pancreatic head and surrounding fat planes. I  cannot exclude pancreatitis. The adrenal glands and kidneys demonstrate  an unremarkable noncontrast appearance. The remaining colon appears  unremarkable. No wall thickening or inflammatory changes are identified.  There appears to have been a right hemicolectomy. There has been a  previous ventral hernia repair with mesh in place. There is a recurrent  hernia just to the right of the mesh at the mid abdomen containing  mesenteric fat and several small bowel loops. There is no bowel  obstruction or evidence for strangulation. This hernia appears stable  from previous imaging. Stomach is  unremarkable. There is moderate  retained foodstuff, similar to prior exam. Some element of gastroparesis  should be considered. Bladder is decompressed by Manning catheter. There  is mild diffuse third-spacing of fluid throughout the abdominal wall.  There is no free intraperitoneal fluid. There is no free air. Visualized  lung bases demonstrate areas of patchy infiltrate superimposed on  chronic background interstitial change. There are trace bilateral  pleural effusions.       Impression:       The head of the pancreas appears edematous and there is  stranding in the adjacent retroperitoneal fat. I cannot exclude  pancreatitis. Correlation with serum amylase and lipase is recommended.  No further potential acute intraabdominal or pelvic process is present.  Other findings are delineated above.     This report was finalized on 3/6/2017 5:07 PM by Dr. Alfie Weston MD.       US Gallbladder [[id number]] Collected:  03/08/17 0637     Updated:  03/08/17 0644    Narrative:       RIGHT UPPER QUADRANT ULTRASOUND     CLINICAL HISTORY:  Female who is 89 years-old, with a history of acute  pancreatitis     FINDINGS:  1. The right kidney is normal measuring 9.2 x 5.0 x 4.5 cm.  2. The liver is normal measuring 14 cm.  3. Gallbladder is surgically absent. Common bile duct measures 12 mm,  upper normal.  4. Visualized pancreas is normal however the pancreatic duct is dilated  to 5 mm.       Impression:       1. Limited imaging due to patient's pain and limited pressure with  transducer.  2. Dilated pancreatic duct, previous cholecystectomy.     This report was finalized on 3/8/2017 6:41 AM by Dr. Atul Hood MD.             Assessment/Plan     Patient Active Problem List   Diagnosis Code   • Atrial fibrillation I48.91   • Fall W19.XXXA   • Chronic kidney disease, stage IV (severe) N18.4   • Left bundle branch block (LBBB) I44.7   • Acute respiratory failure with hypoxia J96.01   • Hypertension I10   • COPD (chronic obstructive  pulmonary disease) J44.9   • Acute kidney injury N17.9   • Weakness R53.1   • UTI (urinary tract infection) N39.0   • Abdominal wall hernia K43.9   • Clostridium difficile colitis A04.7   • Thrush B37.0   • HAP (hospital-acquired pneumonia) J18.9   • Influenza A J10.1   • Acute on chronic renal failure N17.9, N18.9   • Pneumonia due to infectious organism J18.9   • Sepsis A41.9   • Idiopathic acute pancreatitis K85.00     Heather Warner, APRN  03/09/17  8:40 AM        We're following for acute pancreatitis, lipase is now normal  We are following for abdominal pain  We are following for elevated liver tests    General: patient awake, alert and cooperative, moaning in pain   Eyes: Normal lids and lashes, no scleral icterus, no conjunctival pallor   Neck: supple, normal ROM, no tracheal deviation, no thyromegaly   Skin: warm and dry, not jaundiced   Cardiovascular: regular rhythm and rate, no murmurs auscultated   Pulm: clear to auscultation bilaterally, regular and unlabored   Abdomen: soft, generalized tender, nondistended; normal bowel sounds, abdominal binder in place   Rectal: deferred   Extremities: no rash or edema   Neurologic: Normal mood and behavior      We are following for acute pancreatitis in setting of sepsis and multiple medical issues.    Impression:  1. Acute pancreatitis - etiology unclear. multiple abx during course of her hospitalizaitons. Etiology likely multifactorial related to her multiple underlying illnessess/sepsis. Lipase improving.    - US with mild dilation of pancreatic duct, CBC 12mm post florin. Lipid panel neg. No ETOH  -continue gut rest, IVFs and pain management per primary team.  We could consider MRCP.  2. Elevated liver enzymes - increased this am. Possibly related to sepsis vs DILI. Continue to monitor.  Check hep panel.  Consider MRCP when renal function amenable.  If her Cr does not significantly improve can consider MRCP w/o gadolinium.  We can continue serological workup  for elevated liver tests.  Orders will be entered.  3. Pneumonia - per primary team  4. Leukocytosis- trending down

## 2017-03-10 NOTE — PROGRESS NOTES
"[facility]               ASSOCIATES     LOS: 7 days     Name: Clementina Betancur  Age: 89 y.o.  Sex: female  :  1927  MRN: [id number]         Primary Care Physician: Catrina Santiago MD    Subjective   Says abdominal pain is better today. Short of breath. Time: 35 minutes, greater than 50% spent in counseling and coordination of care.    Objective   Temp:  [98.2 °F (36.8 °C)-98.7 °F (37.1 °C)] 98.6 °F (37 °C)  Heart Rate:  [66-70] 69  Resp:  [16-20] 18  BP: (122-132)/(51-54) 122/54  Body mass index is 27.03 kg/(m^2).  Diet Clear Liquid    Objective:  General Appearance:  In no acute distress, uncomfortable and ill-appearing.    Vital signs: (most recent): Blood pressure 122/54, pulse 69, temperature 98.6 °F (37 °C), temperature source Oral, resp. rate 18, height 60\" (152.4 cm), weight 138 lb 6.4 oz (62.8 kg), SpO2 94 %.    Lungs:  Normal respiratory rate and normal effort.  She is not in respiratory distress.  Breath sounds clear to auscultation.    Heart: Normal rate.  Regular rhythm.    Abdomen: Abdomen is soft.  (Has an abdominal binder)There is generalized tenderness.  (Still has pain but says it is better).     Extremities: There is dependent edema (1+).    Neurological: Patient is alert and oriented to person, place and time.    Skin:  Warm and dry.          Results Review:    Reviewed medications and new clinical results    amiodarone 200 mg Oral BID   bumetanide 1 mg Intravenous BID   bumetanide 1 mg Intravenous Once   diltiaZEM  mg Oral Q24H   enoxaparin 30 mg Subcutaneous Daily   fluticasone 2 spray Nasal Daily   hydrALAZINE 75 mg Oral TID   levothyroxine 75 mcg Oral Q AM   saccharomyces boulardii 250 mg Oral BID        Results from last 7 days  Lab Units 03/10/17  0859 17  0737 17  0659 17  0600 17  0802 17  0551 17  0612   WBC 10*3/mm3 15.87* 13.32* 14.92* 14.35* 11.58* 12.70* 12.47*   HEMOGLOBIN g/dL 9.7* 9.1* 10.2* 9.4* " 10.1* 10.4* 10.2*   PLATELETS 10*3/mm3 369 346 391 348 366 371 367     Results from last 7 days  Lab Units 03/10/17  0859 03/09/17  0737 03/08/17  0714 03/07/17  0600 03/06/17  0802 03/05/17  0551 03/04/17  0612   SODIUM mmol/L 138 138 139 138 138 136 135*   POTASSIUM mmol/L 4.2 4.3 4.3 4.9 4.7 4.5 5.0   CHLORIDE mmol/L 100 101 102 100 99 98 94*   TOTAL CO2 mmol/L 18.2* 19.0* 21.1* 21.5* 25.8 25.0 26.3   BUN mg/dL 89* 84* 81* 83* 86* 98* 106*   CREATININE mg/dL 3.68* 3.13* 3.12* 3.43* 3.62* 4.37* 4.82*   CALCIUM mg/dL 9.0 9.3 11.0* 11.9* 12.0* 11.7* 11.9*   GLUCOSE mg/dL 61* 64* 67 81 71 73 82     Results from last 7 days  Lab Units 03/10/17  0859 03/07/17  0600   AMYLASE U/L 37 134*     0  Lab Value Date/Time   LIPASE 63 (H) 03/10/2017 0859   LIPASE 51 03/09/2017 0737   LIPASE 95 (H) 03/08/2017 0714   LIPASE 534 (H) 03/07/2017 0600     Results from last 7 days  Lab Units 03/10/17  0859 03/09/17  0737 03/08/17  0714 03/07/17  0600   ALK PHOS U/L 389* 352* 343* 249*   BILIRUBIN mg/dL 0.3 0.2 0.3 0.2   BILIRUBIN DIRECT mg/dL  --   --   --  <0.2   ALT (SGPT) U/L 208* 130* 102* 100*   AST (SGOT) U/L 257* 154* 86* 94*     Estimated Creatinine Clearance: 8.6 mL/min (by C-G formula based on Cr of 3.68).    Assessment/Plan   Active Hospital Problems (** Indicates Principal Problem)    Diagnosis Date Noted   • **Sepsis [A41.9] 03/04/2017   • Idiopathic acute pancreatitis [K85.00] 03/07/2017   • Pneumonia due to infectious organism [J18.9] 03/04/2017   • Acute on chronic renal failure [N17.9, N18.9] 03/03/2017   • UTI (urinary tract infection) [N39.0] 12/28/2016   • Left bundle branch block (LBBB) [I44.7] 12/27/2016   • COPD (chronic obstructive pulmonary disease) [J44.9] 12/27/2016   • Acute respiratory failure with hypoxia [J96.01] 12/27/2016   • Atrial fibrillation [I48.91] 12/26/2016      Resolved Hospital Problems    Diagnosis Date Noted Date Resolved   No resolved problems to display.     · Off IVF due to concern for  fluid overload  · Clears  · Cefepime stopped  · Cr and LFTs worse  · Discussed with patient and daughter goals of care again. Will ask palliative team to consult.    Artemio Leblanc MD   03/10/17  4:00 PM

## 2017-03-10 NOTE — PLAN OF CARE
Problem: Patient Care Overview (Adult)  Goal: Plan of Care Review  Outcome: Ongoing (interventions implemented as appropriate)    03/10/17 0531   Coping/Psychosocial Response Interventions   Plan Of Care Reviewed With patient   Patient Care Overview   Progress no change   Outcome Evaluation   Outcome Summary/Follow up Plan no signs of acute distress noted. will continue to monitor.        Goal: Adult Individualization and Mutuality  Outcome: Ongoing (interventions implemented as appropriate)  Goal: Discharge Needs Assessment  Outcome: Ongoing (interventions implemented as appropriate)    Problem: Renal Failure/Kidney Injury, Acute (Adult)  Goal: Signs and Symptoms of Listed Potential Problems Will be Absent or Manageable (Renal Failure/Kidney Injury, Acute)  Outcome: Ongoing (interventions implemented as appropriate)    Problem: Pain, Acute (Adult)  Goal: Acceptable Pain Control/Comfort Level  Outcome: Ongoing (interventions implemented as appropriate)    Problem: Fall Risk (Adult)  Goal: Absence of Falls  Outcome: Ongoing (interventions implemented as appropriate)    Problem: Skin Integrity Impairment, Risk/Actual (Adult)  Goal: Skin Integrity/Wound Healing  Outcome: Ongoing (interventions implemented as appropriate)

## 2017-03-10 NOTE — PROGRESS NOTES
"   LOS: 7 days   Patient Care Team:  Catrina Santiago MD as PCP - General (Internal Medicine)    Chief Complaint/ Reason for encounter: Acute renal failure, hypercalcemia  Chief Complaint   Patient presents with   • Weakness - Generalized   • Female Dysuria         Subjective     Weakness - Generalized   Associated symptoms include weakness. Pertinent negatives include no chest pain, fever or nausea.   Female Dysuria    Pertinent negatives include no nausea.       Subjective:  Symptoms:  Worsening.  She reports malaise and weakness.  No shortness of breath or chest pain.  (Less alert today.  Moaning, complaining of abdominal pain.  Continues to say that she \"hurts all over\"  ).    Diet:  NPO.  No nausea.    Activity level: Impaired due to weakness.    Pain:  She complains of pain that is moderate.  She reports pain is unchanged.  Pain is poorly controlled and requiring pain medication.          History taken from: Patient and chart    Objective     Vital Signs  Temp:  [98 °F (36.7 °C)-98.7 °F (37.1 °C)] 98.2 °F (36.8 °C)  Heart Rate:  [66-70] 70  Resp:  [16-20] 20  BP: (127-133)/(51-66) 132/54       Wt Readings from Last 1 Encounters:   03/10/17 0044 138 lb 6.4 oz (62.8 kg)   03/09/17 0601 141 lb 12.8 oz (64.3 kg)   03/08/17 0300 145 lb (65.8 kg)   03/07/17 0500 146 lb (66.2 kg)   03/06/17 0600 120 lb (54.4 kg)   03/03/17 1756 118 lb (53.5 kg)       Objective:  General Appearance:  Ill-appearing, uncomfortable, in pain and in distress (Chronically ill-appearing).    Vital signs: (most recent): Blood pressure 132/54, pulse 70, temperature 98.2 °F (36.8 °C), temperature source Oral, resp. rate 20, height 60\" (152.4 cm), weight 138 lb 6.4 oz (62.8 kg), SpO2 93 %.  Vital signs are normal.  No fever.    Output: Producing urine.    HEENT: Normal HEENT exam.    Lungs:  Tachypnea.  She is not in respiratory distress.  Breath sounds clear to auscultation.  There are decreased breath sounds.  No wheezes.    Heart: Normal rate. "  Regular rhythm.  No murmur.   Abdomen: Abdomen is soft and non-distended.  Bowel sounds are normal.   There is epigastric tenderness. There is no suprapubic area tenderness.     Extremities: Normal range of motion.  There is dependent edema.  There is no deformity.    Pulses: Distal pulses are intact.    Neurological: Patient is alert and oriented to person, place and time.    Skin:  Warm and dry.  No rash or cyanosis.             Results Review:    Past Medical History: reviewed and updated  Past Medical History   Diagnosis Date   • Acute respiratory failure with hypoxia    • Arthritis    • Asthma    • Atrial fibrillation    • Cancer      multiple skincancers   • Candidal stomatitis    • CHF (congestive heart failure)    • Chronic kidney disease, stage 4, severely decreased GFR    • COPD (chronic obstructive pulmonary disease)    • Enterocolitis due to Clostridium difficile 12/29/2016     positive specimen collected on 12/29/2016   • History of falling    • History of transfusion      1989   • Hypertension    • Hypothyroidism    • Muscle weakness (generalized)    • Need for assistance with personal care    • Renal disorder    • Weakness          Allergies:  Allergies   Allergen Reactions   • Augmentin [Amoxicillin-Pot Clavulanate]    • Ceftin [Cefuroxime Axetil]    • Ciprofloxacin    • Colchicine    • Garlic    • Hydrocodone    • Rabeprazole    • Tetracyclines & Related        Intake/Output:     Intake/Output Summary (Last 24 hours) at 03/10/17 0956  Last data filed at 03/09/17 2353   Gross per 24 hour   Intake      0 ml   Output    900 ml   Net   -900 ml         DATA:  Interval chart, labs and notes reviewed.    Labs:   Recent Results (from the past 24 hour(s))   aPTT    Collection Time: 03/10/17  8:59 AM   Result Value Ref Range    PTT 40.7 (H) 22.7 - 35.4 seconds   Protime-INR    Collection Time: 03/10/17  8:59 AM   Result Value Ref Range    Protime 14.6 (H) 11.7 - 14.2 Seconds    INR 1.18 (H) 0.90 - 1.10    Amylase    Collection Time: 03/10/17  8:59 AM   Result Value Ref Range    Amylase 37 28 - 100 U/L   Gamma GT    Collection Time: 03/10/17  8:59 AM   Result Value Ref Range    GGT 78 (H) 5 - 36 U/L   Lipase    Collection Time: 03/10/17  8:59 AM   Result Value Ref Range    Lipase 63 (H) 13 - 60 U/L   Ferritin    Collection Time: 03/10/17  8:59 AM   Result Value Ref Range    Ferritin 286.00 (H) 13.00 - 150.00 ng/mL   Iron Profile    Collection Time: 03/10/17  8:59 AM   Result Value Ref Range    Iron 15 (L) 37 - 145 mcg/dL    Iron Saturation 5 (L) 20 - 50 %    Transferrin 195 (L) 200 - 360 mg/dL    TIBC 291 298 - 536 mcg/dL   Phosphorus    Collection Time: 03/10/17  8:59 AM   Result Value Ref Range    Phosphorus 5.4 (H) 2.5 - 4.5 mg/dL       Radiology: Renal ultrasound personally reviewed, no obstruction, small kidneys consistent with advanced chronic kidney disease  Old records personally reviewed, baseline creatinine 2.1, stage IV chronic kidney disease  The above labs personally reviewed by me  CT and pelvis personally reviewed showing pancreatic edema, possibly acute pancreatitis  High risk med: zometa, requires close monitoring of calcium levels after administration       Medications have been reviewed:  Current Facility-Administered Medications   Medication Dose Route Frequency Provider Last Rate Last Dose   • acetaminophen (TYLENOL) tablet 650 mg  650 mg Oral Q4H PRN Oleksandr Keyes MD       • amiodarone (PACERONE) tablet 200 mg  200 mg Oral BID Oleksandr Keyes MD   200 mg at 03/10/17 0834   • bumetanide (BUMEX) injection 1 mg  1 mg Intravenous BID Filipe Feldman MD   1 mg at 03/10/17 0834   • bumetanide (BUMEX) injection 1 mg  1 mg Intravenous Once Filipe Feldman MD       • diltiaZEM CD (CARDIZEM CD) 24 hr capsule 120 mg  120 mg Oral Q24H Oleksandr Keyes MD   120 mg at 03/10/17 0834   • enoxaparin (LOVENOX) syringe 30 mg  30 mg Subcutaneous Daily Oleksandr Keyes MD   30 mg at 03/10/17  0834   • fluticasone (FLONASE) 50 MCG/ACT nasal spray 2 spray  2 spray Nasal Daily Oleksandr Keyes MD   2 spray at 03/10/17 0834   • Glycerin-Hypromellose- (ARTIFICIAL TEARS) 0.2-0.2-1 % ophthalmic solution solution 1 drop  1 drop Both Eyes Q1H PRN Oleksandr Keyes MD       • hydrALAZINE (APRESOLINE) tablet 75 mg  75 mg Oral TID Oleksandr Keyes MD   75 mg at 03/10/17 0834   • HYDROmorphone (DILAUDID) injection 0.5 mg  0.5 mg Intravenous Q2H PRN Oleksandr Keyes MD   0.5 mg at 03/10/17 0834    And   • naloxone (NARCAN) injection 0.4 mg  0.4 mg Intravenous Q5 Min PRN Oleksandr Keyes MD       • ipratropium-albuterol (DUO-NEB) nebulizer solution 3 mL  3 mL Nebulization Q4H PRN Oleksandr Keyes MD       • levothyroxine (SYNTHROID, LEVOTHROID) tablet 75 mcg  75 mcg Oral Q AM Oleksandr Keyes MD   75 mcg at 03/10/17 0540   • nitroglycerin (NITROSTAT) SL tablet 0.4 mg  0.4 mg Sublingual Q5 Min PRN Oleksandr Keyes MD       • ondansetron (ZOFRAN) tablet 4 mg  4 mg Oral Q6H PRN Oleksandr Keyes MD        Or   • ondansetron ODT (ZOFRAN-ODT) disintegrating tablet 4 mg  4 mg Oral Q6H PRN Oleksandr Keyes MD        Or   • ondansetron (ZOFRAN) injection 4 mg  4 mg Intravenous Q6H PRN Oleksandr Keyes MD   4 mg at 03/09/17 2225   • oxyCODONE-acetaminophen (PERCOCET) 5-325 MG per tablet 1 tablet  1 tablet Oral Q4H PRN Oleksandr Keyes MD   1 tablet at 03/10/17 0044   • saccharomyces boulardii (FLORASTOR) capsule 250 mg  250 mg Oral BID Miguel Demarco MD   250 mg at 03/10/17 0834   • sennosides-docusate sodium (SENOKOT-S) 8.6-50 MG tablet 2 tablet  2 tablet Oral Nightly PRN Oleksandr Keyes MD       • sodium chloride 0.9 % flush 1-10 mL  1-10 mL Intravenous PRN Oleksandr Keyes MD       • sodium chloride 0.9 % flush 10 mL  10 mL Intravenous PRN Sanju Sharma MD           Assessment/Plan     Principal Problem:    Sepsis  Active Problems:    Atrial fibrillation    Left bundle branch  block (LBBB)    Acute respiratory failure with hypoxia    COPD (chronic obstructive pulmonary disease)    UTI (urinary tract infection)    Acute on chronic renal failure    Pneumonia due to infectious organism    Idiopathic acute pancreatitis      Assessment:  (Acute renal failure, marginally better again, likely sustained some element of ATN which will be slow to improve.  Labs pending today  Hypercalcemia, no clear etiology, slowly improving status post Zometa, await a.m. labs  Chronic kidney disease stage IV , prior baseline creatinine around 2.5  acute pancreatitis, possibly related to hypercalcemia   dehydration improved, discontinued IV fluids  Urinary tract infection  Pneumonia, on IV antibiotics   sepsis syndrome improving  COPD  Acute hypoxemic respiratory failure    Plan:  A.m. labs are pending though clinically she appears to be a bit worse today  Remains in significant amounts of pain  Consider palliative care consult  Hold off on any further fluids or diuretics at this time).             Continue to monitor renal function, electroytes and volume closely   Please call me with any questions or concerns      Filipe Feldman MD   [facility]   [phone]    03/10/17  9:56 AM        EMR Dragon/Transcription disclaimer:    Much of this encounter note is an electronic transcription/translation of spoken language to printed text. The electronic translation of spoken language may permit erroneous, or at times, nonsensical words or phrases to be inadvertently transcribed; Although I have reviewed the note for such errors, some may still exist.

## 2017-03-10 NOTE — PROGRESS NOTES
[facility] INPATIENT PROGRESS NOTE         [address]    3/10/2017      PATIENT IDENTIFICATION:   Name:  Clementina Betancur      MRN:  [id number]     89 y.o.  female             LOS 7    Reason for visit: Follow-up sepsis with pneumonia and hypoxemic respiratory failure      SUBJECTIVE:    Positive nonproductive cough.  Positive for SOA. Very weak still.    Objective   OBJECTIVE:    Vital Sign Min/Max for last 24 hours  Temp  Min: 98 °F (36.7 °C)  Max: 98.7 °F (37.1 °C)   BP  Min: 122/54  Max: 133/66   Pulse  Min: 66  Max: 70   Resp  Min: 16  Max: 20   SpO2  Min: 93 %  Max: 97 %   Flow (L/min)  Min: 3  Max: 3   Weight  Min: 138 lb 6.4 oz (62.8 kg)  Max: 138 lb 6.4 oz (62.8 kg)                         Body mass index is 27.03 kg/(m^2).    Intake/Output Summary (Last 24 hours) at 03/10/17 1102  Last data filed at 03/09/17 2353   Gross per 24 hour   Intake      0 ml   Output    900 ml   Net   -900 ml         Exam:  GEN:  No distress, appears stated age  EYES:   PERRL, anicteric sclera  ENT:    External ears/nose normal, OP clear  NECK:  No adenopathy, midline trachea  LUNGS: Normal chest on inspection, palpation and diminished breath sounds bilaterally on auscultation  CV:  Normal S1S2, without murmur  ABD:  Mildly tender, non distended, no hepatosplenomegaly, +BS  EXT:  No  cyanosis or clubbing    Scheduled meds:      amiodarone 200 mg Oral BID   bumetanide 1 mg Intravenous BID   bumetanide 1 mg Intravenous Once   diltiaZEM  mg Oral Q24H   enoxaparin 30 mg Subcutaneous Daily   fluticasone 2 spray Nasal Daily   hydrALAZINE 75 mg Oral TID   levothyroxine 75 mcg Oral Q AM   saccharomyces boulardii 250 mg Oral BID     IV meds:                           Data Review:    Results from last 7 days  Lab Units 03/10/17  0859 03/09/17  0737 03/08/17  0714 03/07/17  0600 03/06/17  0802   SODIUM mmol/L 138 138 139 138 138   POTASSIUM mmol/L 4.2 4.3 4.3 4.9 4.7   CHLORIDE mmol/L 100 101 102 100 99   TOTAL CO2  mmol/L 18.2* 19.0* 21.1* 21.5* 25.8   BUN mg/dL 89* 84* 81* 83* 86*   CREATININE mg/dL 3.68* 3.13* 3.12* 3.43* 3.62*   GLUCOSE mg/dL 61* 64* 67 81 71   CALCIUM mg/dL 9.0 9.3 11.0* 11.9* 12.0*         Estimated Creatinine Clearance: 8.6 mL/min (by C-G formula based on Cr of 3.68).    Results from last 7 days  Lab Units 03/10/17  0859 03/09/17  0737 03/08/17  0659 03/07/17  0600 03/06/17  0802   WBC 10*3/mm3 15.87* 13.32* 14.92* 14.35* 11.58*   HEMOGLOBIN g/dL 9.7* 9.1* 10.2* 9.4* 10.1*   PLATELETS 10*3/mm3 369 346 391 348 366       Results from last 7 days  Lab Units 03/10/17  0859 03/04/17  0612   INR  1.18* 1.01       Results from last 7 days  Lab Units 03/10/17  0859 03/09/17  0737 03/08/17  0714 03/07/17  0600 03/05/17  0551   ALT (SGPT) U/L 208* 130* 102* 100* 95*   AST (SGOT) U/L 257* 154* 86* 94* 106*       Microbiology reviewed : Greater than 100,000 colonies pseudomonas in urine         CT chest viewed:  Atypical bilateral infiltrates versus asymmetric vascular congestion.    CT abd lung cuts reveal:  Visualized lung bases demonstrate areas of patchy infiltrate superimposed on  chronic background interstitial change.    Assessment   ASSESSMENT:   Sepsis  Bibasilar patchy pneumonia  COPD  Acute hypoxemia with respiratory failure  UTI: Pseudomonas in culture  Recent severe C. difficile colitis  Acute kidney injury  Acute Pancreatitis   Chest pain  Anasarca  Transaminitis      PLAN:  Antibiotics being managed by infectious disease.  Encourage pulmonary toilet.  Looks very frail.  Would recommend to consider clear fluids.  Pancreatitis labs have improved.  Palliative may be best option.      Tucker Sim MD  Pulmonary and Critical Care Medicine  [facility], [facility]  3/10/2017    11:02 AM

## 2017-03-10 NOTE — PROGRESS NOTES
[facility]/GI Progress Note   Chief Complaint:  pancreatitis    Subjective     Interval History: Moaning, complaints of abdominal pain.  Generalized weakness and fatigue.    History taken from: patient chart    Review of Systems:    All systems were reviewed and negative except for:  Gastrointestinal: postitive for  pain  Behavioral/Psych: positive for  depression    Objective     Vital Signs  Temp:  [98 °F (36.7 °C)-98.7 °F (37.1 °C)] 98.2 °F (36.8 °C)  Heart Rate:  [66-70] 70  Resp:  [16-20] 20  BP: (127-133)/(51-66) 132/54  Body mass index is 27.03 kg/(m^2).    Intake/Output Summary (Last 24 hours) at 03/10/17 0939  Last data filed at 03/09/17 2233   Gross per 24 hour   Intake      0 ml   Output    900 ml   Net   -900 ml          Physical Exam:   General: elderly female, lying in bed, moaning, patient awake and alert    Eyes: Normal lids and lashes, no scleral icterus   Neck: supple, normal ROM, no tracheal deviation   Skin: warm and dry, not jaundiced   Cardiovascular: regular rhythm and rate, no murmurs auscultated   Pulm: decreased to auscultation bilaterally, regular and unlabored   Abdomen: soft, diffusely tender, nondistended; normal bowel sounds, abd binder in place, hernia noted   Rectal: deferred   Extremities: pitting edema bilateral LE's   Neurologic: depressed/tearful mood and behavior    All Medications Have Been Reviewed     Results Review:       Results from last 7 days  Lab Units 03/09/17  0737 03/08/17  0659 03/07/17  0600   WBC 10*3/mm3 13.32* 14.92* 14.35*   HEMOGLOBIN g/dL 9.1* 10.2* 9.4*   HEMATOCRIT % 27.7* 31.7* 29.1*   PLATELETS 10*3/mm3 346 391 348         Results from last 7 days  Lab Units 03/09/17  0737 03/08/17  0714 03/07/17  0600   SODIUM mmol/L 138 139 138   POTASSIUM mmol/L 4.3 4.3 4.9   CHLORIDE mmol/L 101 102 100   TOTAL CO2 mmol/L 19.0* 21.1* 21.5*   BUN mg/dL 84* 81* 83*   CREATININE mg/dL 3.13* 3.12* 3.43*   CALCIUM mg/dL 9.3 11.0* 11.9*   BILIRUBIN mg/dL 0.2 0.3 0.2   ALK  PHOS U/L 352* 343* 249*   ALT (SGPT) U/L 130* 102* 100*   AST (SGOT) U/L 154* 86* 94*   GLUCOSE mg/dL 64* 67 81         Results from last 7 days  Lab Units 03/10/17  0859 03/04/17  0612   INR  1.18* 1.01       RADIOLOGY:    Imaging Results (last 72 hours)     Procedure Component Value Units Date/Time    US Gallbladder [[id number]] Collected:  03/08/17 0637     Updated:  03/08/17 0644    Narrative:       RIGHT UPPER QUADRANT ULTRASOUND     CLINICAL HISTORY:  Female who is 89 years-old, with a history of acute  pancreatitis     FINDINGS:  1. The right kidney is normal measuring 9.2 x 5.0 x 4.5 cm.  2. The liver is normal measuring 14 cm.  3. Gallbladder is surgically absent. Common bile duct measures 12 mm,  upper normal.  4. Visualized pancreas is normal however the pancreatic duct is dilated  to 5 mm.       Impression:       1. Limited imaging due to patient's pain and limited pressure with  transducer.  2. Dilated pancreatic duct, previous cholecystectomy.     This report was finalized on 3/8/2017 6:41 AM by Dr. Atul Hood MD.             Assessment/Plan     Patient Active Problem List   Diagnosis Code   • Atrial fibrillation I48.91   • Fall W19.XXXA   • Chronic kidney disease, stage IV (severe) N18.4   • Left bundle branch block (LBBB) I44.7   • Acute respiratory failure with hypoxia J96.01   • Hypertension I10   • COPD (chronic obstructive pulmonary disease) J44.9   • Acute kidney injury N17.9   • Weakness R53.1   • UTI (urinary tract infection) N39.0   • Abdominal wall hernia K43.9   • Clostridium difficile colitis A04.7   • Thrush B37.0   • HAP (hospital-acquired pneumonia) J18.9   • Influenza A J10.1   • Acute on chronic renal failure N17.9, N18.9   • Pneumonia due to infectious organism J18.9   • Sepsis A41.9   • Idiopathic acute pancreatitis K85.00       Eri Sharma, APRN  03/10/17  9:39 AM    We are following for pancreatitis, elevated liver tests    Constitutional: She is oriented to person, place,  and time. She appears well-developed and well-nourished.   HENT: anicteric, no thyromegaly  Head: Normocephalic and atraumatic.   Eyes: Conjunctivae and EOM are normal.   Neck: Normal range of motion. No tracheal deviation present. Cardiovascular: Normal rate and regular rhythm.    Pulmonary/Chest: Effort normal and breath sounds normal. No respiratory distress.   Abdominal: Soft. Bowel sounds are normal. She exhibits no distension and no mass. There is epig tenderness. There is no rebound and no guarding.   Musculoskeletal: Normal range of motion.   Neurological: She is alert and oriented to person, place, and time.   Skin: Skin is warm and dry.     Nursing note and vitals reviewed.        Impression:  1. Acute pancreatitis - etiology unclear, Multiple abx during course of her hospitalizaitons. Etiology likely multifactorial related to her multiple underlying illnessess/sepsis. Lipase improving.  US with mild dilation of pancreatic duct, CBD 12mm post florin. Lipid panel neg. No ETOH  -continue gut rest, IVFs and pain management per primary team  2. Elevated liver enzymes - Continued elevations, possibly related to sepsis vs DILI. Hep panel negative, additional liver serology ordered.  Consider MRCP when renal function amenable.  If her Cr does not significantly improve can consider MRCP w/o gadolinium.    3. Pneumonia - per primary team  4. Leukocytosis- trending down    F/u on liver serology  Consider MRCP either with or w/o gadolinium pending liver serology.  Medicine notes reviewed, consideration of Palliative Care.  No firm decision as of this time.

## 2017-03-10 NOTE — PLAN OF CARE
Problem: Patient Care Overview (Adult)  Goal: Plan of Care Review  Outcome: Ongoing (interventions implemented as appropriate)    03/09/17 1910   Coping/Psychosocial Response Interventions   Plan Of Care Reviewed With patient;daughter   Patient Care Overview   Progress no change   Outcome Evaluation   Outcome Summary/Follow up Plan Dr. Leblanc discussed code status with patient and daughter       Goal: Adult Individualization and Mutuality  Outcome: Ongoing (interventions implemented as appropriate)    Problem: Renal Failure/Kidney Injury, Acute (Adult)  Goal: Signs and Symptoms of Listed Potential Problems Will be Absent or Manageable (Renal Failure/Kidney Injury, Acute)  Outcome: Ongoing (interventions implemented as appropriate)    Problem: Pain, Acute (Adult)  Goal: Acceptable Pain Control/Comfort Level  Outcome: Ongoing (interventions implemented as appropriate)    Problem: Fall Risk (Adult)  Goal: Absence of Falls  Outcome: Ongoing (interventions implemented as appropriate)    Problem: Skin Integrity Impairment, Risk/Actual (Adult)  Goal: Skin Integrity/Wound Healing  Outcome: Ongoing (interventions implemented as appropriate)

## 2017-03-11 ENCOUNTER — INPATIENT HOSPITAL (OUTPATIENT)
Dept: URBAN - METROPOLITAN AREA HOSPITAL 113 | Facility: HOSPITAL | Age: 82
End: 2017-03-11

## 2017-03-11 DIAGNOSIS — R94.5 ABNORMAL RESULTS OF LIVER FUNCTION STUDIES: ICD-10-CM

## 2017-03-11 DIAGNOSIS — K85.90 ACUTE PANCREATITIS WITHOUT NECROSIS OR INFECTION, UNSPECIFIE: ICD-10-CM

## 2017-03-11 PROCEDURE — 99231 SBSQ HOSP IP/OBS SF/LOW 25: CPT | Performed by: INTERNAL MEDICINE

## 2017-03-11 NOTE — PROGRESS NOTES
"   LOS: 8 days   Patient Care Team:  Catrina Santiago MD as PCP - General (Internal Medicine)    Chief Complaint/ Reason for encounter: Acute renal failure, hypercalcemia  Chief Complaint   Patient presents with   • Weakness - Generalized   • Female Dysuria         Subjective     Weakness - Generalized   Associated symptoms include weakness. Pertinent negatives include no chest pain, fever or nausea.   Female Dysuria    Pertinent negatives include no nausea.       Subjective:  Symptoms:  Improved.  She reports weakness.  No shortness of breath, malaise or chest pain.  (No new complaints today  She was initially doing quite a bit better  Tolerating clear liquid diet.  States abdominal pain is much improved.  No fevers or chills.  Edema about the same today  ).    Diet:  Poor intake.  No nausea.    Activity level: Returning to normal.    Pain:  She complains of pain that is mild.  She reports pain is improving.  Pain is requiring pain medication and partially controlled.          History taken from: Patient and chart    Objective     Vital Signs  Temp:  [98.2 °F (36.8 °C)-98.6 °F (37 °C)] 98.5 °F (36.9 °C)  Heart Rate:  [65-67] 65  Resp:  [16-18] 16  BP: (114-125)/(52-63) 114/52       Wt Readings from Last 1 Encounters:   03/11/17 0600 148 lb 4.8 oz (67.3 kg)   03/10/17 0044 138 lb 6.4 oz (62.8 kg)   03/09/17 0601 141 lb 12.8 oz (64.3 kg)   03/08/17 0300 145 lb (65.8 kg)   03/07/17 0500 146 lb (66.2 kg)   03/06/17 0600 120 lb (54.4 kg)   03/03/17 1756 118 lb (53.5 kg)       Objective:  General Appearance:  Ill-appearing, comfortable, in no acute distress and not in pain (Chronically ill-appearing).    Vital signs: (most recent): Blood pressure 114/52, pulse 65, temperature 98.5 °F (36.9 °C), temperature source Oral, resp. rate 16, height 60\" (152.4 cm), weight 148 lb 4.8 oz (67.3 kg), SpO2 95 %.  Vital signs are normal.  No fever.    Output: Producing urine.    HEENT: Normal HEENT exam.    Lungs:  Tachypnea.  She is " not in respiratory distress.  Breath sounds clear to auscultation.  There are decreased breath sounds.  No wheezes.    Heart: Normal rate.  Regular rhythm.  No murmur.   Abdomen: Abdomen is soft and non-distended.  Bowel sounds are normal.   There is epigastric tenderness. There is no suprapubic area tenderness.  (Less abdominal tenderness today).     Extremities: Normal range of motion.  There is dependent edema.  There is no deformity.    Pulses: Distal pulses are intact.    Neurological: Patient is alert and oriented to person, place and time.    Skin:  Warm and dry.  No rash or cyanosis.             Results Review:    Past Medical History: reviewed and updated  Past Medical History   Diagnosis Date   • Acute respiratory failure with hypoxia    • Arthritis    • Asthma    • Atrial fibrillation    • Cancer      multiple skincancers   • Candidal stomatitis    • CHF (congestive heart failure)    • Chronic kidney disease, stage 4, severely decreased GFR    • COPD (chronic obstructive pulmonary disease)    • Enterocolitis due to Clostridium difficile 12/29/2016     positive specimen collected on 12/29/2016   • History of falling    • History of transfusion      1989   • Hypertension    • Hypothyroidism    • Muscle weakness (generalized)    • Need for assistance with personal care    • Renal disorder    • Weakness          Allergies:  Allergies   Allergen Reactions   • Augmentin [Amoxicillin-Pot Clavulanate]    • Ceftin [Cefuroxime Axetil]    • Ciprofloxacin    • Colchicine    • Garlic    • Hydrocodone    • Rabeprazole    • Tetracyclines & Related        Intake/Output:     Intake/Output Summary (Last 24 hours) at 03/11/17 0923  Last data filed at 03/11/17 0625   Gross per 24 hour   Intake      0 ml   Output   1450 ml   Net  -1450 ml         DATA:  Interval chart, labs and notes reviewed.    Labs:   Recent Results (from the past 24 hour(s))   Comprehensive Metabolic Panel    Collection Time: 03/11/17  6:20 AM   Result  Value Ref Range    Glucose 125 (H) 65 - 99 mg/dL    BUN 94 (H) 8 - 23 mg/dL    Creatinine 3.59 (H) 0.57 - 1.00 mg/dL    Sodium 137 136 - 145 mmol/L    Potassium 3.4 (L) 3.5 - 5.2 mmol/L    Chloride 99 98 - 107 mmol/L    CO2 22.4 22.0 - 29.0 mmol/L    Calcium 8.2 (L) 8.6 - 10.5 mg/dL    Total Protein 5.2 (L) 6.0 - 8.5 g/dL    Albumin 2.50 (L) 3.50 - 5.20 g/dL    ALT (SGPT) 218 (H) 1 - 33 U/L    AST (SGOT) 239 (H) 1 - 32 U/L    Alkaline Phosphatase 390 (H) 39 - 117 U/L    Total Bilirubin 0.3 0.1 - 1.2 mg/dL    eGFR Non African Amer 12 (L) >60 mL/min/1.73    Globulin 2.7 gm/dL    A/G Ratio 0.9 g/dL    BUN/Creatinine Ratio 26.2 (H) 7.0 - 25.0    Anion Gap 15.6 mmol/L   CBC Auto Differential    Collection Time: 03/11/17  6:20 AM   Result Value Ref Range    WBC 13.73 (H) 4.50 - 10.70 10*3/mm3    RBC 3.14 (L) 3.90 - 5.20 10*6/mm3    Hemoglobin 9.7 (L) 11.9 - 15.5 g/dL    Hematocrit 28.7 (L) 35.6 - 45.5 %    MCV 91.4 80.5 - 98.2 fL    MCH 30.9 26.9 - 32.0 pg    MCHC 33.8 32.4 - 36.3 g/dL    RDW 14.5 (H) 11.7 - 13.0 %    RDW-SD 48.3 37.0 - 54.0 fl    MPV 10.4 6.0 - 12.0 fL    Platelets 385 140 - 500 10*3/mm3    Neutrophil % 70.9 42.7 - 76.0 %    Lymphocyte % 8.7 (L) 19.6 - 45.3 %    Monocyte % 16.8 (H) 5.0 - 12.0 %    Eosinophil % 3.0 0.3 - 6.2 %    Basophil % 0.2 0.0 - 1.5 %    Immature Grans % 0.4 0.0 - 0.5 %    Neutrophils, Absolute 9.74 (H) 1.90 - 8.10 10*3/mm3    Lymphocytes, Absolute 1.19 0.90 - 4.80 10*3/mm3    Monocytes, Absolute 2.31 (H) 0.20 - 1.20 10*3/mm3    Eosinophils, Absolute 0.41 0.00 - 0.70 10*3/mm3    Basophils, Absolute 0.03 0.00 - 0.20 10*3/mm3    Immature Grans, Absolute 0.05 (H) 0.00 - 0.03 10*3/mm3   Phosphorus    Collection Time: 03/11/17  6:20 AM   Result Value Ref Range    Phosphorus 4.8 (H) 2.5 - 4.5 mg/dL            Medications have been reviewed:  Current Facility-Administered Medications   Medication Dose Route Frequency Provider Last Rate Last Dose   • acetaminophen (TYLENOL) tablet 650 mg   650 mg Oral Q4H PRN Oleksandr Keyes MD       • amiodarone (PACERONE) tablet 200 mg  200 mg Oral BID Oleksandr Keyes MD   200 mg at 03/10/17 1736   • bumetanide (BUMEX) injection 1 mg  1 mg Intravenous BID Filipe Feldman MD   1 mg at 03/10/17 0834   • bumetanide (BUMEX) injection 1 mg  1 mg Intravenous Once Filipe Feldman MD       • diltiaZEM CD (CARDIZEM CD) 24 hr capsule 120 mg  120 mg Oral Q24H Oleksandr Keyes MD   120 mg at 03/10/17 0834   • enoxaparin (LOVENOX) syringe 30 mg  30 mg Subcutaneous Daily Oleksandr Keyes MD   30 mg at 03/10/17 0834   • fluticasone (FLONASE) 50 MCG/ACT nasal spray 2 spray  2 spray Nasal Daily Oleksandr Keyes MD   2 spray at 03/10/17 0834   • Glycerin-Hypromellose- (ARTIFICIAL TEARS) 0.2-0.2-1 % ophthalmic solution solution 1 drop  1 drop Both Eyes Q1H PRN Oleksandr Keyes MD       • hydrALAZINE (APRESOLINE) tablet 75 mg  75 mg Oral TID Oleksandr Keyes MD   75 mg at 03/10/17 2132   • HYDROmorphone (DILAUDID) injection 0.5 mg  0.5 mg Intravenous Q2H PRN Oleksandr Keyes MD   0.5 mg at 03/11/17 0358    And   • naloxone (NARCAN) injection 0.4 mg  0.4 mg Intravenous Q5 Min PRN Oleksandr Keyes MD       • ipratropium-albuterol (DUO-NEB) nebulizer solution 3 mL  3 mL Nebulization Q4H PRN Oleksandr Keyes MD       • levothyroxine (SYNTHROID, LEVOTHROID) tablet 75 mcg  75 mcg Oral Q AM Oleksandr Keyes MD   75 mcg at 03/11/17 0650   • nitroglycerin (NITROSTAT) SL tablet 0.4 mg  0.4 mg Sublingual Q5 Min PRN Oleksandr Keyes MD       • ondansetron (ZOFRAN) tablet 4 mg  4 mg Oral Q6H PRN Oleksandr Keyes MD        Or   • ondansetron ODT (ZOFRAN-ODT) disintegrating tablet 4 mg  4 mg Oral Q6H PRN Oleksandr Keyes MD        Or   • ondansetron (ZOFRAN) injection 4 mg  4 mg Intravenous Q6H PRN Oleksandr Keyes MD   4 mg at 03/09/17 2227   • oxyCODONE-acetaminophen (PERCOCET) 5-325 MG per tablet 1 tablet  1 tablet Oral Q4H PRN Oleksandr Keyes MD    1 tablet at 03/11/17 0650   • saccharomyces boulardii (FLORASTOR) capsule 250 mg  250 mg Oral BID Miguel Demarco MD   250 mg at 03/10/17 1735   • sennosides-docusate sodium (SENOKOT-S) 8.6-50 MG tablet 2 tablet  2 tablet Oral Nightly PRN Oleksandr Keyes MD       • sodium chloride 0.9 % flush 1-10 mL  1-10 mL Intravenous PRN Oleksandr Keyes MD       • sodium chloride 0.9 % flush 10 mL  10 mL Intravenous PRN Sanju Sharma MD       • temazepam (RESTORIL) capsule 7.5 mg  7.5 mg Oral Once Thang Trejo MD           Assessment/Plan     Principal Problem:    Sepsis  Active Problems:    Atrial fibrillation    Left bundle branch block (LBBB)    Acute respiratory failure with hypoxia    COPD (chronic obstructive pulmonary disease)    UTI (urinary tract infection)    Acute on chronic renal failure    Pneumonia due to infectious organism    Idiopathic acute pancreatitis      Assessment:  (Acute renal failure, about the same.  Likely some component of ATN versus progression of her chronic kidney disease  Hypercalcemia, no clear etiology, slowly improving status post Zometa,   Chronic kidney disease stage IV , prior baseline creatinine around 2.5  acute pancreatitis, possibly related to hypercalcemia, seems to be improving   dehydration improved, discontinued IV fluids  Urinary tract infection  Pneumonia, off IV antibiotics   sepsis syndrome improving  COPD  Acute hypoxemic respiratory failure    Plan:  Clinically improved today  Continue clear liquid diet  Still looks volume overloaded on exam but BUN is climbing today, decrease Bumex dose   Continue to encourage oral fluid intake  Replace potassium orally  Calcium level continues to normalize, corrects to normal today with low albumin).             Continue to monitor renal function, electroytes and volume closely   Please call me with any questions or concerns      Filipe Feldman MD   [facility]   [phone]    03/11/17  9:23  TREVOR Mcaias/Transcription disclaimer:    Much of this encounter note is an electronic transcription/translation of spoken language to printed text. The electronic translation of spoken language may permit erroneous, or at times, nonsensical words or phrases to be inadvertently transcribed; Although I have reviewed the note for such errors, some may still exist.

## 2017-03-11 NOTE — PLAN OF CARE
Problem: Patient Care Overview (Adult)  Goal: Plan of Care Review  Outcome: Ongoing (interventions implemented as appropriate)    03/10/17 1953   Coping/Psychosocial Response Interventions   Plan Of Care Reviewed With patient;daughter   Patient Care Overview   Progress no change   Outcome Evaluation   Outcome Summary/Follow up Plan Patient continues to report constant abdominal pain and generalized pain R/T arthritis. Medicated PRN throughout shift. Palliative conslt ordered       Goal: Adult Individualization and Mutuality  Outcome: Ongoing (interventions implemented as appropriate)  Goal: Discharge Needs Assessment  Outcome: Ongoing (interventions implemented as appropriate)    Problem: Renal Failure/Kidney Injury, Acute (Adult)  Goal: Signs and Symptoms of Listed Potential Problems Will be Absent or Manageable (Renal Failure/Kidney Injury, Acute)  Outcome: Ongoing (interventions implemented as appropriate)    Problem: Pain, Acute (Adult)  Goal: Acceptable Pain Control/Comfort Level  Outcome: Ongoing (interventions implemented as appropriate)    Problem: Fall Risk (Adult)  Goal: Absence of Falls  Outcome: Ongoing (interventions implemented as appropriate)    Problem: Skin Integrity Impairment, Risk/Actual (Adult)  Goal: Skin Integrity/Wound Healing  Outcome: Ongoing (interventions implemented as appropriate)

## 2017-03-11 NOTE — PROGRESS NOTES
"   LOS: 8 days   Patient Care Team:  Catrina Santiago MD as PCP - General (Internal Medicine)    Chief Complaint:  Pancreatitis, increased liver enzymes    Subjective   She indicates that she is in worse shape earlier but actually is feeling better.  Has some upper abdominal pain, denies any bleeding or vomiting   Weakness - Generalized   Associated symptoms include weakness.   Female Dysuria          Subjective:  Symptoms:  Improved.  She reports weakness.    Diet:  Poor intake.    Activity level: Impaired due to weakness.    Pain:  She complains of pain that is mild.        History taken from: patient chart RN    Objective     Vital Signs  Temp:  [98.2 °F (36.8 °C)-98.6 °F (37 °C)] 98.5 °F (36.9 °C)  Heart Rate:  [65-67] 65  Resp:  [16-18] 16  BP: (114-125)/(52-63) 114/52    Objective:  General Appearance:  Ill-appearing.    Vital signs: (most recent): Blood pressure 114/52, pulse 65, temperature 98.5 °F (36.9 °C), temperature source Oral, resp. rate 16, height 60\" (152.4 cm), weight 148 lb 4.8 oz (67.3 kg), SpO2 95 %.    Lungs:  Normal respiratory rate.    Heart: Normal rate.    Abdomen: Hypoactive bowel sounds.   There is left upper quadrant and epigastric tenderness. There is no rebound tenderness.  There is no guarding.     Neurological: Patient is alert and oriented to person, place and time.    Skin:  Warm and dry.              Results Review:     I reviewed the patient's new clinical results.    Medication Review:     Assessment/Plan     Principal Problem:    Sepsis  Active Problems:    Atrial fibrillation    Left bundle branch block (LBBB)    Acute respiratory failure with hypoxia    COPD (chronic obstructive pulmonary disease)    UTI (urinary tract infection)    Acute on chronic renal failure    Pneumonia due to infectious organism    Idiopathic acute pancreatitis      Assessment:  (1. Acute pancreatitis - etiology unclear, Multiple abx during course of her hospitalizaitons. Etiology likely multifactorial " related to her multiple underlying illnessess/sepsis. Lipase improving. US with mild dilation of pancreatic duct, CBD 12mm post florin. Lipid panel neg. Slow progress, continue clear liquid   -continue gut rest, IVFs and pain management per primary team  2. Elevated liver enzymes - Continued elevations, possibly related to sepsis vs DILI. Hep panel negative, additional liver serology ordered. Consider MRCP when renal function amenable. If her Cr does not significantly improve can consider MRCP w/o gadolinium.   ).       Justin Sanders MD  03/11/17  2:21 PM    Time: Critical care 14 min

## 2017-03-11 NOTE — PROGRESS NOTES
"[facility]               ASSOCIATES     LOS: 8 days     Name: Clementina Betancur  Age: 89 y.o.  Sex: female  :  1927  MRN: [id number]         Primary Care Physician: Catrina Santiago MD    Subjective   She feels better. Less abdominal pain.    Objective   Temp:  [98.2 °F (36.8 °C)-98.5 °F (36.9 °C)] 98.5 °F (36.9 °C)  Heart Rate:  [65-67] 65  Resp:  [16] 16  BP: (114-121)/(52-56) 114/52  Body mass index is 28.96 kg/(m^2).  Diet Clear Liquid    Objective:  General Appearance:  In no acute distress, ill-appearing and comfortable (looks more comfortable today).    Vital signs: (most recent): Blood pressure 114/52, pulse 65, temperature 98.5 °F (36.9 °C), temperature source Oral, resp. rate 16, height 60\" (152.4 cm), weight 148 lb 4.8 oz (67.3 kg), SpO2 95 %.    Lungs:  Normal respiratory rate and normal effort.  She is not in respiratory distress.  Breath sounds clear to auscultation.    Heart: Normal rate.  Regular rhythm.    Abdomen: Abdomen is soft.  (Has an abdominal binder)There is generalized tenderness.  (Minimal epigastric pain today).     Extremities: There is dependent edema (1+).    Neurological: Patient is alert and oriented to person, place and time.    Skin:  Warm and dry.          Results Review:    Reviewed medications and new clinical results    amiodarone 200 mg Oral BID   bumetanide 1 mg Intravenous Once   [START ON 3/12/2017] bumetanide 1 mg Intravenous Daily   diltiaZEM  mg Oral Q24H   enoxaparin 30 mg Subcutaneous Daily   fluticasone 2 spray Nasal Daily   hydrALAZINE 75 mg Oral TID   levothyroxine 75 mcg Oral Q AM   potassium chloride 40 mEq Oral Once   saccharomyces boulardii 250 mg Oral BID   temazepam 7.5 mg Oral Once        Results from last 7 days  Lab Units 17  0620 03/10/17  0859 17  0737 17  0659 17  0600 17  0802 17  0551   WBC 10*3/mm3 13.73* 15.87* 13.32* 14.92* 14.35* 11.58* 12.70*   HEMOGLOBIN g/dL 9.7* " 9.7* 9.1* 10.2* 9.4* 10.1* 10.4*   PLATELETS 10*3/mm3 385 369 346 391 348 366 371       Results from last 7 days  Lab Units 03/11/17  0620 03/10/17  0859 03/09/17  0737 03/08/17  0714 03/07/17  0600 03/06/17  0802 03/05/17  0551   SODIUM mmol/L 137 138 138 139 138 138 136   POTASSIUM mmol/L 3.4* 4.2 4.3 4.3 4.9 4.7 4.5   CHLORIDE mmol/L 99 100 101 102 100 99 98   TOTAL CO2 mmol/L 22.4 18.2* 19.0* 21.1* 21.5* 25.8 25.0   BUN mg/dL 94* 89* 84* 81* 83* 86* 98*   CREATININE mg/dL 3.59* 3.68* 3.13* 3.12* 3.43* 3.62* 4.37*   CALCIUM mg/dL 8.2* 9.0 9.3 11.0* 11.9* 12.0* 11.7*   GLUCOSE mg/dL 125* 61* 64* 67 81 71 73       Results from last 7 days  Lab Units 03/10/17  0859 03/07/17  0600   AMYLASE U/L 37 134*       0  Lab Value Date/Time   LIPASE 63 (H) 03/10/2017 0859   LIPASE 51 03/09/2017 0737   LIPASE 95 (H) 03/08/2017 0714   LIPASE 534 (H) 03/07/2017 0600     Results from last 7 days  Lab Units 03/11/17  0620 03/10/17  0859 03/09/17  0737  03/07/17  0600   ALK PHOS U/L 390* 389* 352*  < > 249*   BILIRUBIN mg/dL 0.3 0.3 0.2  < > 0.2   BILIRUBIN DIRECT mg/dL  --   --   --   --  <0.2   ALT (SGPT) U/L 218* 208* 130*  < > 100*   AST (SGOT) U/L 239* 257* 154*  < > 94*   < > = values in this interval not displayed.  Estimated Creatinine Clearance: 9.1 mL/min (by C-G formula based on Cr of 3.59).    Assessment/Plan   Active Hospital Problems (** Indicates Principal Problem)    Diagnosis Date Noted   • **Sepsis [A41.9] 03/04/2017   • Idiopathic acute pancreatitis [K85.00] 03/07/2017   • Pneumonia due to infectious organism [J18.9] 03/04/2017   • Acute on chronic renal failure [N17.9, N18.9] 03/03/2017   • UTI (urinary tract infection) [N39.0] 12/28/2016   • Left bundle branch block (LBBB) [I44.7] 12/27/2016   • COPD (chronic obstructive pulmonary disease) [J44.9] 12/27/2016   • Acute respiratory failure with hypoxia [J96.01] 12/27/2016   • Atrial fibrillation [I48.91] 12/26/2016      Resolved Hospital Problems    Diagnosis Date  Noted Date Resolved   No resolved problems to display.     · Continue on clears  · Cefepime stopped  · Cr and LFTs about the same  · Palliative nurse to see though she is in much less pain today    Artemio Leblanc MD   03/11/17  3:02 PM

## 2017-03-11 NOTE — PROGRESS NOTES
[facility] INPATIENT PROGRESS NOTE         [address]    3/11/2017      PATIENT IDENTIFICATION:   Name:  Clementina Betancur      MRN:  [id number]     89 y.o.  female             LOS 8    Reason for visit: Follow-up sepsis with pneumonia and hypoxemic respiratory failure    SUBJECTIVE:    Positive nonproductive cough.  Positive for SOA. Very weak still.  She is more awake  Objective   OBJECTIVE:    Vital Sign Min/Max for last 24 hours  Temp  Min: 98.1 °F (36.7 °C)  Max: 98.5 °F (36.9 °C)   BP  Min: 114/52  Max: 121/56   Pulse  Min: 59  Max: 67   Resp  Min: 16  Max: 16   SpO2  Min: 95 %  Max: 96 %   Flow (L/min)  Min: 3  Max: 3   Weight  Min: 148 lb 4.8 oz (67.3 kg)  Max: 148 lb 4.8 oz (67.3 kg)           Body mass index is 28.96 kg/(m^2).    Intake/Output Summary (Last 24 hours) at 03/11/17 1538  Last data filed at 03/11/17 1523   Gross per 24 hour   Intake      0 ml   Output   1800 ml   Net  -1800 ml       Exam:  GEN:  No distress, appears stated age, looks chronically sick  EYES:   PERRL, anicteric sclera  ENT:    External ears/nose normal, OP clear  NECK:  No adenopathy, midline trachea  LUNGS: Normal chest on inspection, palpation and diminished breath sounds bilaterally on auscultation  CV:  Normal S1S2, without murmur  ABD:  Mildly tender, non distended, no hepatosplenomegaly, +BS  EXT:  No  cyanosis or clubbing    Scheduled meds:      amiodarone 200 mg Oral BID   bumetanide 1 mg Intravenous Once   [START ON 3/12/2017] bumetanide 1 mg Intravenous Daily   diltiaZEM  mg Oral Q24H   enoxaparin 30 mg Subcutaneous Daily   fluticasone 2 spray Nasal Daily   hydrALAZINE 75 mg Oral TID   levothyroxine 75 mcg Oral Q AM   potassium chloride 40 mEq Oral Once   saccharomyces boulardii 250 mg Oral BID   temazepam 7.5 mg Oral Once     IV meds:                           Data Review:    Results from last 7 days  Lab Units 03/11/17  0620 03/10/17  0859 03/09/17  0737 03/08/17  0714 03/07/17  0600   SODIUM  mmol/L 137 138 138 139 138   POTASSIUM mmol/L 3.4* 4.2 4.3 4.3 4.9   CHLORIDE mmol/L 99 100 101 102 100   TOTAL CO2 mmol/L 22.4 18.2* 19.0* 21.1* 21.5*   BUN mg/dL 94* 89* 84* 81* 83*   CREATININE mg/dL 3.59* 3.68* 3.13* 3.12* 3.43*   GLUCOSE mg/dL 125* 61* 64* 67 81   CALCIUM mg/dL 8.2* 9.0 9.3 11.0* 11.9*         Estimated Creatinine Clearance: 9.1 mL/min (by C-G formula based on Cr of 3.59).    Results from last 7 days  Lab Units 03/11/17  0620 03/10/17  0859 03/09/17  0737 03/08/17  0659 03/07/17  0600   WBC 10*3/mm3 13.73* 15.87* 13.32* 14.92* 14.35*   HEMOGLOBIN g/dL 9.7* 9.7* 9.1* 10.2* 9.4*   PLATELETS 10*3/mm3 385 369 346 391 348       Results from last 7 days  Lab Units 03/10/17  0859   INR  1.18*       Results from last 7 days  Lab Units 03/11/17  0620 03/10/17  0859 03/09/17  0737 03/08/17  0714 03/07/17  0600   ALT (SGPT) U/L 218* 208* 130* 102* 100*   AST (SGOT) U/L 239* 257* 154* 86* 94*       Microbiology reviewed : Greater than 100,000 colonies pseudomonas in urine         CT chest viewed:  Atypical bilateral infiltrates versus asymmetric vascular congestion.      Assessment   ASSESSMENT:   Sepsis  Bibasilar patchy pneumonia, off antibiotics   COPD  Acute hypoxemia with respiratory failure  UTI: Pseudomonas in culture  Recent severe C. difficile colitis  Acute kidney injury  Acute Pancreatitis   Chest pain  Anasarca  Transaminitis      PLAN:  Looks very frail.  Would recommend to consider clear fluids.  Pancreatitis labs have improved.  Palliative may be best option.        Alix Reynolds MD  Pulmonary and Critical Care Medicine  [facility], [facility]  3/11/2017    3:38 PM

## 2017-03-11 NOTE — PLAN OF CARE
Problem: Patient Care Overview (Adult)  Goal: Plan of Care Review  Outcome: Ongoing (interventions implemented as appropriate)    03/11/17 0556   Coping/Psychosocial Response Interventions   Plan Of Care Reviewed With patient   Patient Care Overview   Progress improving   Outcome Evaluation   Outcome Summary/Follow up Plan Patient reports generalized pain, pain meds given q2 with fair relief, no other complications         Problem: Pain, Acute (Adult)  Goal: Acceptable Pain Control/Comfort Level  Outcome: Ongoing (interventions implemented as appropriate)    Problem: Fall Risk (Adult)  Goal: Absence of Falls  Outcome: Ongoing (interventions implemented as appropriate)    Problem: Skin Integrity Impairment, Risk/Actual (Adult)  Goal: Skin Integrity/Wound Healing  Outcome: Ongoing (interventions implemented as appropriate)

## 2017-03-12 PROCEDURE — 99231 SBSQ HOSP IP/OBS SF/LOW 25: CPT | Performed by: INTERNAL MEDICINE

## 2017-03-12 NOTE — PROGRESS NOTES
"[facility]               ASSOCIATES     LOS: 9 days     Name: Clementina Betancur  Age: 89 y.o.  Sex: female  :  1927  MRN: [id number]         Primary Care Physician: Catrina Santiago MD    Subjective   She feels better. Abdomen sore but not as bad. Discussed with daughter. Time: 35 minutes, greater than 50% spent in counseling and coordination of care.    Objective   Temp:  [97.2 °F (36.2 °C)-98.2 °F (36.8 °C)] 97.5 °F (36.4 °C)  Heart Rate:  [59-63] 60  Resp:  [16-18] 16  BP: (115-169)/(48-78) 129/65  Body mass index is 26.93 kg/(m^2).  Diet Clear Liquid    Objective:  General Appearance:  In no acute distress, ill-appearing and comfortable.    Vital signs: (most recent): Blood pressure 129/65, pulse 60, temperature 97.5 °F (36.4 °C), temperature source Oral, resp. rate 16, height 60\" (152.4 cm), weight 137 lb 14.4 oz (62.6 kg), SpO2 95 %.    Lungs:  Normal respiratory rate and normal effort.  She is not in respiratory distress.  Breath sounds clear to auscultation.    Heart: Normal rate.  Regular rhythm.    Abdomen: Abdomen is soft.  (Has an abdominal binder)There is generalized tenderness.  (Mild pain today with palpation).     Extremities: There is dependent edema (2+).    Neurological: Patient is alert and oriented to person, place and time.    Skin:  Warm and dry.          Results Review:    Reviewed medications and new clinical results    amiodarone 200 mg Oral BID   diltiaZEM  mg Oral Q24H   enoxaparin 30 mg Subcutaneous Daily   fluticasone 2 spray Nasal Daily   hydrALAZINE 75 mg Oral TID   levothyroxine 75 mcg Oral Q AM   saccharomyces boulardii 250 mg Oral BID       sodium chloride 50 mL/hr Last Rate: 50 mL/hr (17 1215)       Results from last 7 days  Lab Units 17  0620 03/10/17  0859 17  0737 17  0659 17  0600 17  0802   WBC 10*3/mm3 13.73* 15.87* 13.32* 14.92* 14.35* 11.58*   HEMOGLOBIN g/dL 9.7* 9.7* 9.1* 10.2* 9.4* 10.1* "   PLATELETS 10*3/mm3 385 369 346 391 348 366       Results from last 7 days  Lab Units 03/12/17  0838 03/11/17  0620 03/10/17  0859 03/09/17  0737 03/08/17  0714 03/07/17  0600 03/06/17  0802   SODIUM mmol/L 140 137 138 138 139 138 138   POTASSIUM mmol/L 3.9 3.4* 4.2 4.3 4.3 4.9 4.7   CHLORIDE mmol/L 102 99 100 101 102 100 99   TOTAL CO2 mmol/L 21.8* 22.4 18.2* 19.0* 21.1* 21.5* 25.8   BUN mg/dL 93* 94* 89* 84* 81* 83* 86*   CREATININE mg/dL 3.64* 3.59* 3.68* 3.13* 3.12* 3.43* 3.62*   CALCIUM mg/dL 8.5* 8.2* 9.0 9.3 11.0* 11.9* 12.0*   GLUCOSE mg/dL 101* 125* 61* 64* 67 81 71     Results from last 7 days  Lab Units 03/10/17  0859 03/07/17  0600   AMYLASE U/L 37 134*     0  Lab Value Date/Time   LIPASE 63 (H) 03/10/2017 0859   LIPASE 51 03/09/2017 0737   LIPASE 95 (H) 03/08/2017 0714   LIPASE 534 (H) 03/07/2017 0600     Results from last 7 days  Lab Units 03/11/17  0620 03/10/17  0859 03/09/17  0737  03/07/17  0600   ALK PHOS U/L 390* 389* 352*  < > 249*   BILIRUBIN mg/dL 0.3 0.3 0.2  < > 0.2   BILIRUBIN DIRECT mg/dL  --   --   --   --  <0.2   ALT (SGPT) U/L 218* 208* 130*  < > 100*   AST (SGOT) U/L 239* 257* 154*  < > 94*   < > = values in this interval not displayed.  Estimated Creatinine Clearance: 8.7 mL/min (by C-G formula based on Cr of 3.64).    Assessment/Plan   Active Hospital Problems (** Indicates Principal Problem)    Diagnosis Date Noted   • **Sepsis [A41.9] 03/04/2017   • Idiopathic acute pancreatitis [K85.00] 03/07/2017   • Pneumonia due to infectious organism [J18.9] 03/04/2017   • Acute on chronic renal failure [N17.9, N18.9] 03/03/2017   • UTI (urinary tract infection) [N39.0] 12/28/2016   • Left bundle branch block (LBBB) [I44.7] 12/27/2016   • COPD (chronic obstructive pulmonary disease) [J44.9] 12/27/2016   • Acute respiratory failure with hypoxia [J96.01] 12/27/2016   • Atrial fibrillation [I48.91] 12/26/2016      Resolved Hospital Problems    Diagnosis Date Noted Date Resolved   No resolved  problems to display.     · Clears. Possible MRCP per GI  · Cefepime stopped  · Trial of IVF  · Discussed with daughter  · Palliative consult tomorrow with family    Artemio Leblanc MD   03/12/17  2:33 PM

## 2017-03-12 NOTE — PROGRESS NOTES
[facility] INPATIENT PROGRESS NOTE         [address]    3/12/2017      PATIENT IDENTIFICATION:   Name:  Clementina Betancur      MRN:  [id number]     89 y.o.  female             LOS 9    Reason for visit: Follow-up sepsis with pneumonia and hypoxemic respiratory failure    SUBJECTIVE:    Positive nonproductive cough.  Positive for SOA. Very weak still.  She is more awake. Has leg swelling   Objective   OBJECTIVE:    Vital Sign Min/Max for last 24 hours  Temp  Min: 97.2 °F (36.2 °C)  Max: 98.2 °F (36.8 °C)   BP  Min: 89/64  Max: 169/78   Pulse  Min: 59  Max: 63   Resp  Min: 16  Max: 18   SpO2  Min: 95 %  Max: 97 %   Flow (L/min)  Min: 3  Max: 3   Weight  Min: 137 lb 14.4 oz (62.6 kg)  Max: 137 lb 14.4 oz (62.6 kg)           Body mass index is 26.93 kg/(m^2).    Intake/Output Summary (Last 24 hours) at 03/12/17 1451  Last data filed at 03/11/17 2245   Gross per 24 hour   Intake      0 ml   Output    750 ml   Net   -750 ml       Exam:  GEN:  No distress, appears stated age, looks chronically sick  EYES:   PERRL, anicteric sclera  ENT:    External ears/nose normal, OP clear  NECK:  No adenopathy, midline trachea  LUNGS: Normal chest on inspection, palpation and diminished breath sounds bilaterally on auscultation  CV:  Normal S1S2, without murmur  ABD:  Mildly tender, non distended, no hepatosplenomegaly, +BS  EXT:  No  cyanosis or clubbing/, edema 2+    Scheduled meds:      amiodarone 200 mg Oral BID   diltiaZEM  mg Oral Q24H   enoxaparin 30 mg Subcutaneous Daily   fluticasone 2 spray Nasal Daily   hydrALAZINE 75 mg Oral TID   levothyroxine 75 mcg Oral Q AM   saccharomyces boulardii 250 mg Oral BID     IV meds:                          sodium chloride 50 mL/hr Last Rate: 50 mL/hr (03/12/17 1215)     Data Review:    Results from last 7 days  Lab Units 03/12/17  0838 03/11/17  0620 03/10/17  0859 03/09/17  0737 03/08/17  0714   SODIUM mmol/L 140 137 138 138 139   POTASSIUM mmol/L 3.9 3.4* 4.2 4.3  4.3   CHLORIDE mmol/L 102 99 100 101 102   TOTAL CO2 mmol/L 21.8* 22.4 18.2* 19.0* 21.1*   BUN mg/dL 93* 94* 89* 84* 81*   CREATININE mg/dL 3.64* 3.59* 3.68* 3.13* 3.12*   GLUCOSE mg/dL 101* 125* 61* 64* 67   CALCIUM mg/dL 8.5* 8.2* 9.0 9.3 11.0*         Estimated Creatinine Clearance: 8.7 mL/min (by C-G formula based on Cr of 3.64).    Results from last 7 days  Lab Units 03/11/17  0620 03/10/17  0859 03/09/17  0737 03/08/17  0659 03/07/17  0600   WBC 10*3/mm3 13.73* 15.87* 13.32* 14.92* 14.35*   HEMOGLOBIN g/dL 9.7* 9.7* 9.1* 10.2* 9.4*   PLATELETS 10*3/mm3 385 369 346 391 348       Results from last 7 days  Lab Units 03/10/17  0859   INR  1.18*       Results from last 7 days  Lab Units 03/11/17  0620 03/10/17  0859 03/09/17  0737 03/08/17  0714 03/07/17  0600   ALT (SGPT) U/L 218* 208* 130* 102* 100*   AST (SGOT) U/L 239* 257* 154* 86* 94*       Microbiology reviewed : Greater than 100,000 colonies pseudomonas in urine         CT chest viewed:  Atypical bilateral infiltrates versus asymmetric vascular congestion.      Assessment   ASSESSMENT:   Sepsis  Bibasilar patchy pneumonia, off antibiotics   COPD  Acute hypoxemia with respiratory failure  UTI: Pseudomonas in culture  Recent severe C. difficile colitis  Acute kidney injury  Acute Pancreatitis   Chest pain  Anasarca  Transaminitis      PLAN:  Looks very frail.      Alix Reynolds MD  Pulmonary and Critical Care Medicine  [facility], [facility]  3/12/2017    2:51 PM

## 2017-03-12 NOTE — PROGRESS NOTES
"   LOS: 9 days   Patient Care Team:  Catrina Santiago MD as PCP - General (Internal Medicine)    Chief Complaint/ Reason for encounter: Acute renal failure, hypercalcemia  Chief Complaint   Patient presents with   • Weakness - Generalized   • Female Dysuria         Subjective     Weakness - Generalized   Associated symptoms include weakness. Pertinent negatives include no chest pain, fever or nausea.   Female Dysuria    Pertinent negatives include no nausea.       Subjective:  Symptoms:  Worsening.  She reports weakness.  No shortness of breath, malaise or chest pain.  (Fluids but worse today  Again complaining of some abdominal pain  No shortness of breath  Edema improved  Poor by mouth intake).    Diet:  Poor intake.  No nausea.    Activity level: Returning to normal.    Pain:  She complains of pain that is mild.  She reports pain is worsening.  Pain is requiring pain medication and partially controlled.          History taken from: Patient and chart    Objective     Vital Signs  Temp:  [97.2 °F (36.2 °C)-98.2 °F (36.8 °C)] 97.5 °F (36.4 °C)  Heart Rate:  [59-63] 60  Resp:  [16-18] 16  BP: (115-169)/(48-78) 129/65       Wt Readings from Last 1 Encounters:   03/12/17 0600 137 lb 14.4 oz (62.6 kg)   03/11/17 0600 148 lb 4.8 oz (67.3 kg)   03/10/17 0044 138 lb 6.4 oz (62.8 kg)   03/09/17 0601 141 lb 12.8 oz (64.3 kg)   03/08/17 0300 145 lb (65.8 kg)   03/07/17 0500 146 lb (66.2 kg)   03/06/17 0600 120 lb (54.4 kg)   03/03/17 1756 118 lb (53.5 kg)       Objective:  General Appearance:  Ill-appearing, comfortable, in no acute distress and not in pain (Chronically ill-appearing).    Vital signs: (most recent): Blood pressure 129/65, pulse 60, temperature 97.5 °F (36.4 °C), temperature source Oral, resp. rate 16, height 60\" (152.4 cm), weight 137 lb 14.4 oz (62.6 kg), SpO2 95 %.  Vital signs are normal.  No fever.    Output: Producing urine.    HEENT: Normal HEENT exam.    Lungs:  Tachypnea.  She is not in respiratory " distress.  Breath sounds clear to auscultation.  There are decreased breath sounds.  No wheezes.    Heart: Normal rate.  Regular rhythm.  No murmur.   Abdomen: Abdomen is soft and non-distended.  Bowel sounds are normal.   There is epigastric tenderness. There is no suprapubic area tenderness.  (Less abdominal tenderness today).     Extremities: Normal range of motion.  There is dependent edema.  There is no deformity.    Pulses: Distal pulses are intact.    Neurological: Patient is alert and oriented to person, place and time.    Skin:  Warm and dry.  No rash or cyanosis.             Results Review:    Past Medical History: reviewed and updated  Past Medical History   Diagnosis Date   • Acute respiratory failure with hypoxia    • Arthritis    • Asthma    • Atrial fibrillation    • Cancer      multiple skincancers   • Candidal stomatitis    • CHF (congestive heart failure)    • Chronic kidney disease, stage 4, severely decreased GFR    • COPD (chronic obstructive pulmonary disease)    • Enterocolitis due to Clostridium difficile 12/29/2016     positive specimen collected on 12/29/2016   • History of falling    • History of transfusion      1989   • Hypertension    • Hypothyroidism    • Muscle weakness (generalized)    • Need for assistance with personal care    • Renal disorder    • Weakness          Allergies:  Allergies   Allergen Reactions   • Augmentin [Amoxicillin-Pot Clavulanate]    • Ceftin [Cefuroxime Axetil]    • Ciprofloxacin    • Colchicine    • Garlic    • Hydrocodone    • Rabeprazole    • Tetracyclines & Related        Intake/Output:     Intake/Output Summary (Last 24 hours) at 03/12/17 1047  Last data filed at 03/11/17 2245   Gross per 24 hour   Intake      0 ml   Output    750 ml   Net   -750 ml         DATA:  Interval chart, labs and notes reviewed.    Labs:   Recent Results (from the past 24 hour(s))   Renal Function Panel    Collection Time: 03/12/17  8:38 AM   Result Value Ref Range    Glucose 101  (H) 65 - 99 mg/dL    BUN 93 (H) 8 - 23 mg/dL    Creatinine 3.64 (H) 0.57 - 1.00 mg/dL    Sodium 140 136 - 145 mmol/L    Potassium 3.9 3.5 - 5.2 mmol/L    Chloride 102 98 - 107 mmol/L    CO2 21.8 (L) 22.0 - 29.0 mmol/L    Calcium 8.5 (L) 8.6 - 10.5 mg/dL    Albumin 2.60 (L) 3.50 - 5.20 g/dL    Phosphorus 4.5 2.5 - 4.5 mg/dL    Anion Gap 16.2 mmol/L    BUN/Creatinine Ratio 25.5 (H) 7.0 - 25.0    eGFR Non African Amer 12 (L) >60 mL/min/1.73   Calcium, Ionized    Collection Time: 03/12/17  8:45 AM   Result Value Ref Range    Ionized Calcium 1.19 1.10 - 1.35 mmol/L    Ionized Calcium 4.8 4.6 - 5.4 mg/dL            Medications have been reviewed:  Current Facility-Administered Medications   Medication Dose Route Frequency Provider Last Rate Last Dose   • acetaminophen (TYLENOL) tablet 650 mg  650 mg Oral Q4H PRN Oleksandr Keyes MD       • amiodarone (PACERONE) tablet 200 mg  200 mg Oral BID Oleksandr Keyes MD   200 mg at 03/11/17 1824   • bumetanide (BUMEX) injection 1 mg  1 mg Intravenous Once Filipe Feldman MD       • bumetanide (BUMEX) injection 1 mg  1 mg Intravenous Daily Filipe Feldman MD       • diltiaZEM CD (CARDIZEM CD) 24 hr capsule 120 mg  120 mg Oral Q24H Oleksandr Keyes MD   120 mg at 03/11/17 0932   • enoxaparin (LOVENOX) syringe 30 mg  30 mg Subcutaneous Daily Oleksandr Keyes MD   30 mg at 03/11/17 0931   • fluticasone (FLONASE) 50 MCG/ACT nasal spray 2 spray  2 spray Nasal Daily Oleksandr Keyes MD   2 spray at 03/11/17 0934   • Glycerin-Hypromellose- (ARTIFICIAL TEARS) 0.2-0.2-1 % ophthalmic solution solution 1 drop  1 drop Both Eyes Q1H PRN Oleksandr Keyes MD       • hydrALAZINE (APRESOLINE) tablet 75 mg  75 mg Oral TID Oleksandr Keyes MD   75 mg at 03/11/17 2102   • HYDROmorphone (DILAUDID) injection 0.5 mg  0.5 mg Intravenous Q2H PRN Oleksandr Keyes MD   0.5 mg at 03/12/17 0536    And   • naloxone (NARCAN) injection 0.4 mg  0.4 mg Intravenous Q5 Min PRN Oleksandr  YURIY Keyes MD       • ipratropium-albuterol (DUO-NEB) nebulizer solution 3 mL  3 mL Nebulization Q4H PRN Oleksandr Keyes MD       • levothyroxine (SYNTHROID, LEVOTHROID) tablet 75 mcg  75 mcg Oral Q AM Oleksandr Keyes MD   75 mcg at 03/12/17 0638   • nitroglycerin (NITROSTAT) SL tablet 0.4 mg  0.4 mg Sublingual Q5 Min PRN Oleksandr Keyes MD       • ondansetron (ZOFRAN) tablet 4 mg  4 mg Oral Q6H PRN Oleksandr Keyes MD        Or   • ondansetron ODT (ZOFRAN-ODT) disintegrating tablet 4 mg  4 mg Oral Q6H PRN Oleksandr Keyes MD        Or   • ondansetron (ZOFRAN) injection 4 mg  4 mg Intravenous Q6H PRN Oleksandr Keyes MD   4 mg at 03/11/17 2102   • oxyCODONE-acetaminophen (PERCOCET) 5-325 MG per tablet 1 tablet  1 tablet Oral Q4H PRN Oleksandr Keyes MD   1 tablet at 03/12/17 0639   • saccharomyces boulardii (FLORASTOR) capsule 250 mg  250 mg Oral BID Miguel Demarco MD   250 mg at 03/11/17 1824   • sennosides-docusate sodium (SENOKOT-S) 8.6-50 MG tablet 2 tablet  2 tablet Oral Nightly PRN Oleksandr Keyes MD       • sodium chloride 0.9 % flush 1-10 mL  1-10 mL Intravenous PRN Oleksandr Keyes MD       • sodium chloride 0.9 % flush 10 mL  10 mL Intravenous PRN Sanju Sharma MD           Assessment/Plan     Principal Problem:    Sepsis  Active Problems:    Atrial fibrillation    Left bundle branch block (LBBB)    Acute respiratory failure with hypoxia    COPD (chronic obstructive pulmonary disease)    UTI (urinary tract infection)    Acute on chronic renal failure    Pneumonia due to infectious organism    Idiopathic acute pancreatitis      Assessment:  (Acute renal failure, a bit worse again today  Hypercalcemia, better, status post Zometa  Chronic kidney disease stage IV , prior baseline creatinine around 2.5  acute pancreatitis, possibly related to hypercalcemia,    COPD  Acute hypoxemic respiratory failure  Abdominal pain    Plan:  Looks a bit worse today, very poor oral  intake  Renal function a bit worse  We'll try some gentle IV fluids today  Replace electrolytes as needed  Overall prognosis long-term fairly poor    ).             Continue to monitor renal function, electroytes and volume closely   Please call me with any questions or concerns      Filipe Feldman MD   [facility]   [phone]    03/12/17  10:47 AM        EMR Dragon/Transcription disclaimer:    Much of this encounter note is an electronic transcription/translation of spoken language to printed text. The electronic translation of spoken language may permit erroneous, or at times, nonsensical words or phrases to be inadvertently transcribed; Although I have reviewed the note for such errors, some may still exist.

## 2017-03-12 NOTE — PROGRESS NOTES
"   LOS: 9 days   Patient Care Team:  Catrina Santiago MD as PCP - General (Internal Medicine)    Chief Complaint: pancreatitis, increase liver enzymes    Subjective   Patient weak, states has upper abdominal discomfort,  Not necessarily worse with food   Weakness - Generalized     Female Dysuria          Subjective:  Symptoms:  Stable.    Diet:  Adequate intake.    Activity level: Impaired due to weakness.    Pain:  She complains of pain that is mild.        History taken from: patient chart    Objective     Vital Signs  Temp:  [97.2 °F (36.2 °C)-98.2 °F (36.8 °C)] 97.5 °F (36.4 °C)  Heart Rate:  [59-63] 60  Resp:  [16-18] 16  BP: (115-169)/(48-78) 129/65    Objective:  General Appearance:  Comfortable.    Vital signs: (most recent): Blood pressure 129/65, pulse 60, temperature 97.5 °F (36.4 °C), temperature source Oral, resp. rate 16, height 60\" (152.4 cm), weight 137 lb 14.4 oz (62.6 kg), SpO2 95 %.  Vital signs are normal.    Output: Producing urine.    Lungs:  Normal effort.  There are decreased breath sounds.    Heart: Normal rate.    Abdomen: Abdomen is soft.  Hypoactive bowel sounds.   There is left upper quadrant and epigastric tenderness. There is no rebound tenderness.  There is no guarding.     Pulses: Distal pulses are intact.    Neurological: Patient is alert and oriented to person, place and time.    Skin:  Warm and dry.              Results Review:     I reviewed the patient's new clinical results.    Medication Review:     Assessment/Plan     Principal Problem:    Sepsis  Active Problems:    Atrial fibrillation    Left bundle branch block (LBBB)    Acute respiratory failure with hypoxia    COPD (chronic obstructive pulmonary disease)    UTI (urinary tract infection)    Acute on chronic renal failure    Pneumonia due to infectious organism    Idiopathic acute pancreatitis      Assessment:  (Acute pancreatitis - etiology unclear, Multiple abx during course of her hospitalizaitons. Etiology likely " multifactorial related to her multiple underlying illnessess/sepsis. Lipase improving. US with mild dilation of pancreatic duct, CBD 12mm post florin. Lipid panel neg. Slow progress, continue clear liquid   -continue gut rest, IVFs and pain management per primary team  2. Elevated liver enzymes - Continued elevations, possibly related to sepsis vs DILI. Hep panel negative, additional liver serology ordered. Consider MRCP when renal function amenable. If her Cr does not significantly improve can consider MRCP w/o gadolinium. ).       Justin Sanders MD  03/12/17  12:46 PM    Time: Critical care 12 min

## 2017-03-12 NOTE — PLAN OF CARE
Problem: Patient Care Overview (Adult)  Goal: Plan of Care Review  Outcome: Ongoing (interventions implemented as appropriate)    03/12/17 0643   Coping/Psychosocial Response Interventions   Plan Of Care Reviewed With patient   Patient Care Overview   Progress improving   Outcome Evaluation   Outcome Summary/Follow up Plan Pt rested fair, pain meds x2 with good relief, no other complications         Problem: Pain, Acute (Adult)  Goal: Acceptable Pain Control/Comfort Level  Outcome: Ongoing (interventions implemented as appropriate)    Problem: Fall Risk (Adult)  Goal: Absence of Falls  Outcome: Ongoing (interventions implemented as appropriate)    Problem: Skin Integrity Impairment, Risk/Actual (Adult)  Goal: Skin Integrity/Wound Healing  Outcome: Ongoing (interventions implemented as appropriate)

## 2017-03-13 NOTE — PROGRESS NOTES
"[facility]               ASSOCIATES     LOS: 10 days     Name: Clementina Betancur  Age: 89 y.o.  Sex: female  :  1927  MRN: [id number]         Primary Care Physician: Catrina Santiago MD    Subjective   She doesn't feel as well today. States she hurts all over. Mild shortness of breath. Time: 35 minutes, greater than 50% spent in counseling and coordination of care. Discussed with patient goals of care.    Objective   Temp:  [97.1 °F (36.2 °C)-97.9 °F (36.6 °C)] 97.5 °F (36.4 °C)  Heart Rate:  [62-67] 67  Resp:  [16-20] 20  BP: ()/(49-65) 114/49  Body mass index is 28.12 kg/(m^2).  Diet Full Liquid    Objective:  General Appearance:  In no acute distress, ill-appearing and comfortable.    Vital signs: (most recent): Blood pressure 114/49, pulse 67, temperature 97.5 °F (36.4 °C), temperature source Oral, resp. rate 20, height 60\" (152.4 cm), weight 143 lb 15.4 oz (65.3 kg), SpO2 99 %.    Lungs:  Normal respiratory rate and normal effort.  She is not in respiratory distress.  There are decreased breath sounds.    Heart: Normal rate.  Regular rhythm.    Abdomen: Abdomen is soft.  (Has an abdominal binder)There is generalized tenderness.  (Mild pain with palpation).     Extremities: There is dependent edema (2+).    Neurological: Patient is alert and oriented to person, place and time.    Skin:  Warm and dry.          Results Review:    Reviewed medications and new clinical results    amiodarone 200 mg Oral BID   bisacodyl 10 mg Rectal Once   bumetanide 0.5 mg Intravenous Once   diltiaZEM  mg Oral Q24H   docusate sodium 100 mg Oral BID   enoxaparin 30 mg Subcutaneous Daily   fluticasone 2 spray Nasal Daily   hydrALAZINE 75 mg Oral TID   levothyroxine 75 mcg Oral Q AM   saccharomyces boulardii 250 mg Oral BID          Results from last 7 days  Lab Units 17  0740 17  0620 03/10/17  0859 17  0737 17  0659 17  0600   WBC 10*3/mm3 12.39* 13.73* " 15.87* 13.32* 14.92* 14.35*   HEMOGLOBIN g/dL 10.2* 9.7* 9.7* 9.1* 10.2* 9.4*   PLATELETS 10*3/mm3 361 385 369 346 391 348       Results from last 7 days  Lab Units 03/13/17  0740 03/12/17  0838 03/11/17  0620 03/10/17  0859 03/09/17  0737 03/08/17  0714 03/07/17  0600   SODIUM mmol/L 138 140 137 138 138 139 138   POTASSIUM mmol/L 3.8 3.9 3.4* 4.2 4.3 4.3 4.9   CHLORIDE mmol/L 103 102 99 100 101 102 100   TOTAL CO2 mmol/L 17.4* 21.8* 22.4 18.2* 19.0* 21.1* 21.5*   BUN mg/dL 85* 93* 94* 89* 84* 81* 83*   CREATININE mg/dL 3.34* 3.64* 3.59* 3.68* 3.13* 3.12* 3.43*   CALCIUM mg/dL 8.0* 8.5* 8.2* 9.0 9.3 11.0* 11.9*   GLUCOSE mg/dL 94 101* 125* 61* 64* 67 81     Results from last 7 days  Lab Units 03/10/17  0859 03/07/17  0600   AMYLASE U/L 37 134*     0  Lab Value Date/Time   LIPASE 63 (H) 03/10/2017 0859   LIPASE 51 03/09/2017 0737   LIPASE 95 (H) 03/08/2017 0714   LIPASE 534 (H) 03/07/2017 0600     Results from last 7 days  Lab Units 03/13/17  0740 03/11/17  0620 03/10/17  0859  03/07/17  0600   ALK PHOS U/L 412* 390* 389*  < > 249*   BILIRUBIN mg/dL 0.4 0.3 0.3  < > 0.2   BILIRUBIN DIRECT mg/dL 0.2  --   --   --  <0.2   ALT (SGPT) U/L 206* 218* 208*  < > 100*   AST (SGOT) U/L 170* 239* 257*  < > 94*   < > = values in this interval not displayed.  Estimated Creatinine Clearance: 9.6 mL/min (by C-G formula based on Cr of 3.34).    Assessment/Plan   Active Hospital Problems (** Indicates Principal Problem)    Diagnosis Date Noted   • **Sepsis [A41.9] 03/04/2017   • Idiopathic acute pancreatitis [K85.00] 03/07/2017   • Pneumonia due to infectious organism [J18.9] 03/04/2017   • Acute on chronic renal failure [N17.9, N18.9] 03/03/2017   • UTI (urinary tract infection) [N39.0] 12/28/2016   • Left bundle branch block (LBBB) [I44.7] 12/27/2016   • COPD (chronic obstructive pulmonary disease) [J44.9] 12/27/2016   • Acute respiratory failure with hypoxia [J96.01] 12/27/2016   • Atrial fibrillation [I48.91] 12/26/2016       Resolved Hospital Problems    Diagnosis Date Noted Date Resolved   No resolved problems to display.     · Possible MRCP per GI depending on goals of care  · IV fluids stopped, diuretics per nephrology. Hopefully will help with shortness of breath  · Palliative consult: they're trying to coordinate with family (daughter at home with sick family) to discuss goals of care. I talked with the patient again today about CODE STATUS and she would not make a decision and asked what her daughter is wanting.    Artemio Leblanc MD   03/13/17  2:29 PM

## 2017-03-13 NOTE — PROGRESS NOTES
"   LOS: 10 days   Patient Care Team:  Catrina Santiago MD as PCP - General (Internal Medicine)    Chief Complaint/ Reason for encounter: Acute renal failure, hypercalcemia  Chief Complaint   Patient presents with   • Weakness - Generalized   • Female Dysuria         Subjective     Weakness - Generalized   Associated symptoms include weakness. Pertinent negatives include no chest pain, fever or nausea.   Female Dysuria    Pertinent negatives include no nausea.       Subjective:  Symptoms:  Worsening.  She reports malaise and weakness.  No shortness of breath or chest pain.  (Seems to be a bit more uncomfortable today  Complaining of her mouth being very dry).    Diet:  Poor intake.  No nausea.    Activity level: Impaired due to weakness.    Pain:  She complains of pain that is mild.  She reports pain is unchanged.  Pain is requiring pain medication and partially controlled.          History taken from: Patient and chart    Objective     Vital Signs  Temp:  [97.1 °F (36.2 °C)-97.9 °F (36.6 °C)] 97.9 °F (36.6 °C)  Heart Rate:  [62-67] 67  Resp:  [16-18] 18  BP: ()/(57-65) 121/65       Wt Readings from Last 1 Encounters:   03/13/17 0600 143 lb 15.4 oz (65.3 kg)   03/12/17 0600 137 lb 14.4 oz (62.6 kg)   03/11/17 0600 148 lb 4.8 oz (67.3 kg)   03/10/17 0044 138 lb 6.4 oz (62.8 kg)   03/09/17 0601 141 lb 12.8 oz (64.3 kg)   03/08/17 0300 145 lb (65.8 kg)   03/07/17 0500 146 lb (66.2 kg)   03/06/17 0600 120 lb (54.4 kg)   03/03/17 1756 118 lb (53.5 kg)       Objective:  General Appearance:  Ill-appearing, comfortable, in no acute distress and not in pain (Chronically ill-appearing).    Vital signs: (most recent): Blood pressure 121/65, pulse 67, temperature 97.9 °F (36.6 °C), temperature source Oral, resp. rate 18, height 60\" (152.4 cm), weight 143 lb 15.4 oz (65.3 kg), SpO2 97 %.  Vital signs are normal.  No fever.    Output: Producing urine.    HEENT: Normal HEENT exam.    Lungs:  Tachypnea.  She is not in " respiratory distress.  Breath sounds clear to auscultation.  There are decreased breath sounds.  No wheezes.    Heart: Normal rate.  Regular rhythm.  No murmur.   Abdomen: Abdomen is soft and non-distended.  Bowel sounds are normal.   There is epigastric tenderness. There is no suprapubic area tenderness.  (Less abdominal tenderness today).     Extremities: Normal range of motion.  There is dependent edema.  There is no deformity.    Pulses: Distal pulses are intact.    Neurological: Patient is alert and oriented to person, place and time.    Skin:  Warm and dry.  No rash or cyanosis.             Results Review:    Past Medical History: reviewed and updated  Past Medical History   Diagnosis Date   • Acute respiratory failure with hypoxia    • Arthritis    • Asthma    • Atrial fibrillation    • Cancer      multiple skincancers   • Candidal stomatitis    • CHF (congestive heart failure)    • Chronic kidney disease, stage 4, severely decreased GFR    • COPD (chronic obstructive pulmonary disease)    • Enterocolitis due to Clostridium difficile 12/29/2016     positive specimen collected on 12/29/2016   • History of falling    • History of transfusion      1989   • Hypertension    • Hypothyroidism    • Muscle weakness (generalized)    • Need for assistance with personal care    • Renal disorder    • Weakness          Allergies:  Allergies   Allergen Reactions   • Augmentin [Amoxicillin-Pot Clavulanate]    • Ceftin [Cefuroxime Axetil]    • Ciprofloxacin    • Colchicine    • Garlic    • Hydrocodone    • Rabeprazole    • Tetracyclines & Related        Intake/Output:     Intake/Output Summary (Last 24 hours) at 03/13/17 1317  Last data filed at 03/13/17 0608   Gross per 24 hour   Intake   1195 ml   Output    650 ml   Net    545 ml         DATA:  Interval chart, labs and notes reviewed.    Labs:   Recent Results (from the past 24 hour(s))   CBC (No Diff)    Collection Time: 03/13/17  7:40 AM   Result Value Ref Range    WBC  12.39 (H) 4.50 - 10.70 10*3/mm3    RBC 3.37 (L) 3.90 - 5.20 10*6/mm3    Hemoglobin 10.2 (L) 11.9 - 15.5 g/dL    Hematocrit 32.2 (L) 35.6 - 45.5 %    MCV 95.5 80.5 - 98.2 fL    MCH 30.3 26.9 - 32.0 pg    MCHC 31.7 (L) 32.4 - 36.3 g/dL    RDW 15.3 (H) 11.7 - 13.0 %    RDW-SD 53.1 37.0 - 54.0 fl    MPV 10.5 6.0 - 12.0 fL    Platelets 361 140 - 500 10*3/mm3   Hepatic Function Panel    Collection Time: 03/13/17  7:40 AM   Result Value Ref Range    Total Protein 5.4 (L) 6.0 - 8.5 g/dL    Albumin 2.40 (L) 3.50 - 5.20 g/dL    ALT (SGPT) 206 (H) 1 - 33 U/L    AST (SGOT) 170 (H) 1 - 32 U/L    Alkaline Phosphatase 412 (H) 39 - 117 U/L    Total Bilirubin 0.4 0.1 - 1.2 mg/dL    Bilirubin, Direct 0.2 0.0 - 0.3 mg/dL    Bilirubin, Indirect 0.2 mg/dL   Phosphorus    Collection Time: 03/13/17  7:40 AM   Result Value Ref Range    Phosphorus 4.1 2.5 - 4.5 mg/dL   Basic Metabolic Panel    Collection Time: 03/13/17  7:40 AM   Result Value Ref Range    Glucose 94 65 - 99 mg/dL    BUN 85 (H) 8 - 23 mg/dL    Creatinine 3.34 (H) 0.57 - 1.00 mg/dL    Sodium 138 136 - 145 mmol/L    Potassium 3.8 3.5 - 5.2 mmol/L    Chloride 103 98 - 107 mmol/L    CO2 17.4 (L) 22.0 - 29.0 mmol/L    Calcium 8.0 (L) 8.6 - 10.5 mg/dL    eGFR Non African Amer 13 (L) >60 mL/min/1.73    BUN/Creatinine Ratio 25.4 (H) 7.0 - 25.0    Anion Gap 17.6 mmol/L            Medications have been reviewed:  Current Facility-Administered Medications   Medication Dose Route Frequency Provider Last Rate Last Dose   • acetaminophen (TYLENOL) tablet 650 mg  650 mg Oral Q4H PRN Oleksandr Keyes MD       • amiodarone (PACERONE) tablet 200 mg  200 mg Oral BID Oleksandr Keyes MD   200 mg at 03/13/17 0839   • bisacodyl (DULCOLAX) suppository 10 mg  10 mg Rectal Once AFUA Kurtz       • bumetanide (BUMEX) injection 0.5 mg  0.5 mg Intravenous Once Filipe Feldman MD       • diltiaZEM CD (CARDIZEM CD) 24 hr capsule 120 mg  120 mg Oral Q24H Oleksandr Keyes MD   120 mg  at 03/13/17 0839   • docusate sodium (COLACE) capsule 100 mg  100 mg Oral BID AFUA Kurtz       • enoxaparin (LOVENOX) syringe 30 mg  30 mg Subcutaneous Daily Oleksandr Keyes MD   30 mg at 03/13/17 0840   • fluticasone (FLONASE) 50 MCG/ACT nasal spray 2 spray  2 spray Nasal Daily Oleksandr Keyes MD   2 spray at 03/13/17 0840   • Glycerin-Hypromellose- (ARTIFICIAL TEARS) 0.2-0.2-1 % ophthalmic solution solution 1 drop  1 drop Both Eyes Q1H PRN Oleksandr Keyes MD       • hydrALAZINE (APRESOLINE) tablet 75 mg  75 mg Oral TID Oleksandr Keyes MD   75 mg at 03/13/17 0840   • HYDROmorphone (DILAUDID) injection 0.5 mg  0.5 mg Intravenous Q2H PRN Oleksandr Keyes MD   0.5 mg at 03/12/17 0536    And   • naloxone (NARCAN) injection 0.4 mg  0.4 mg Intravenous Q5 Min PRN Oleksandr Keyes MD       • ipratropium-albuterol (DUO-NEB) nebulizer solution 3 mL  3 mL Nebulization Q4H PRN Oleksandr Keyes MD       • levothyroxine (SYNTHROID, LEVOTHROID) tablet 75 mcg  75 mcg Oral Q AM Oleksandr Keyes MD   75 mcg at 03/13/17 0554   • nitroglycerin (NITROSTAT) SL tablet 0.4 mg  0.4 mg Sublingual Q5 Min PRN Oleksandr Keyes MD       • ondansetron (ZOFRAN) tablet 4 mg  4 mg Oral Q6H PRN Oleksandr Keyes MD        Or   • ondansetron ODT (ZOFRAN-ODT) disintegrating tablet 4 mg  4 mg Oral Q6H PRN Oleksandr Keyes MD        Or   • ondansetron (ZOFRAN) injection 4 mg  4 mg Intravenous Q6H PRN Oleksandr Keyes MD   4 mg at 03/11/17 2102   • oxyCODONE-acetaminophen (PERCOCET) 5-325 MG per tablet 1 tablet  1 tablet Oral Q4H PRN Oleksandr Keyes MD   1 tablet at 03/12/17 0639   • saccharomyces boulardii (FLORASTOR) capsule 250 mg  250 mg Oral BID Miguel Demarco MD   250 mg at 03/13/17 0840   • sennosides-docusate sodium (SENOKOT-S) 8.6-50 MG tablet 2 tablet  2 tablet Oral Nightly PRN Oleksandr Keyes MD       • sodium chloride 0.9 % flush 1-10 mL  1-10 mL Intravenous PRN Oleksandr Keyes,  MD       • sodium chloride 0.9 % flush 10 mL  10 mL Intravenous PRN Sanju Sharma MD           Assessment/Plan     Principal Problem:    Sepsis  Active Problems:    Atrial fibrillation    Left bundle branch block (LBBB)    Acute respiratory failure with hypoxia    COPD (chronic obstructive pulmonary disease)    UTI (urinary tract infection)    Acute on chronic renal failure    Pneumonia due to infectious organism    Idiopathic acute pancreatitis      Assessment:  (Acute renal failure, fairly stable   Hypercalcemia, better, status post Zometa  Chronic kidney disease stage IV , prior baseline creatinine around 2.5  acute pancreatitis, possibly related to hypercalcemia,  , still some abdominal pain, tolerating clears well  COPD  Acute hypoxemic respiratory failure  Abdominal pain    Plan:  Renal function is improved but edema is a bit worse today  Discontinue IV fluids, Bumex times one dose IV  Await palliative care conference with family later today).             Continue to monitor renal function, electroytes and volume closely   Please call me with any questions or concerns      Filipe Feldman MD   [facility]   [phone]    03/13/17  1:17 PM        EMR Dragon/Transcription disclaimer:    Much of this encounter note is an electronic transcription/translation of spoken language to printed text. The electronic translation of spoken language may permit erroneous, or at times, nonsensical words or phrases to be inadvertently transcribed; Although I have reviewed the note for such errors, some may still exist.

## 2017-03-13 NOTE — PLAN OF CARE
Problem: Patient Care Overview (Adult)  Goal: Plan of Care Review  Outcome: Ongoing (interventions implemented as appropriate)    03/13/17 0411   Coping/Psychosocial Response Interventions   Plan Of Care Reviewed With patient   Patient Care Overview   Progress no change   Outcome Evaluation   Outcome Summary/Follow up Plan Patient very tearful at the beginning of the shift but has since then improved. Daughter called about pallative meeting later today. Patient stable but is anxious.       Goal: Adult Individualization and Mutuality  Outcome: Ongoing (interventions implemented as appropriate)  Goal: Discharge Needs Assessment  Outcome: Ongoing (interventions implemented as appropriate)    Problem: Renal Failure/Kidney Injury, Acute (Adult)  Goal: Signs and Symptoms of Listed Potential Problems Will be Absent or Manageable (Renal Failure/Kidney Injury, Acute)  Outcome: Ongoing (interventions implemented as appropriate)    03/13/17 0411   Renal Failure/Kidney Injury, Acute   Problems Assessed (Acute Renal Failure/Kidney Injury) all   Problems Present (Acute Renal Failure/Kidney Injury) situational response         Problem: Pain, Acute (Adult)  Goal: Acceptable Pain Control/Comfort Level  Outcome: Ongoing (interventions implemented as appropriate)    03/13/17 0411   Pain, Acute (Adult)   Acceptable Pain Control/Comfort Level making progress toward outcome         Problem: Fall Risk (Adult)  Goal: Absence of Falls  Outcome: Ongoing (interventions implemented as appropriate)    03/13/17 0411   Fall Risk (Adult)   Absence of Falls making progress toward outcome         Problem: Skin Integrity Impairment, Risk/Actual (Adult)  Goal: Skin Integrity/Wound Healing  Outcome: Ongoing (interventions implemented as appropriate)    03/13/17 0411   Skin Integrity Impairment, Risk/Actual (Adult)   Skin Integrity/Wound Healing making progress toward outcome

## 2017-03-13 NOTE — PROGRESS NOTES
[facility]  Phone: [phone]  Lester Andino MD    Subjective:  LOS: 10    She states that she feels poorly.  However no specific respiratory complaints.  She denies any increased shortness of breath or cough.  She is just extremely weak.    Objective   Vital Signs past 24hrs  BP range: BP: ()/(57-65) 121/65  Pulse range: Heart Rate:  [62-67] 67  Resp rate range: Resp:  [16-18] 18  Temp range: Temp (24hrs), Av.5 °F (36.4 °C), Min:97.1 °F (36.2 °C), Max:97.9 °F (36.6 °C)    O2 Device: nasal cannulaFlow (L/min):  [3] 3  Oxygen range:SpO2:  [96 %-100 %] 97 %   143 lb 15.4 oz (65.3 kg); Body mass index is 28.12 kg/(m^2).    Intake/Output Summary (Last 24 hours) at 17 1251  Last data filed at 17 0608   Gross per 24 hour   Intake   1195 ml   Output    650 ml   Net    545 ml       Physical Exam   Cardiovascular: Normal rate and regular rhythm.    No murmur heard.  Pulmonary/Chest: She has decreased breath sounds. She has no wheezes. She has rales in the right lower field and the left lower field.   Abdominal: Soft. Bowel sounds are normal. She exhibits no distension and no mass. There is tenderness.   Musculoskeletal: She exhibits edema (generalized edema).   Neurological: She is alert.     Results Review:    I have reviewed the laboratory and imaging data since the last note by [facility] physician.  My annotations are noted in assessment and plan.    Medication Review:  I have reviewed the current MAR.  My annotations are noted in assessment and plan.      sodium chloride 50 mL/hr Last Rate: 50 mL/hr (17 1215)     Plan   PCCM Problems  Sepsis resolved  Bibasilar pneumonia - resolved   COPD  Acute hypoxemia with respiratory failure    UTI - treated  Recent severe C. difficile colitis  Acute kidney injury  Acute Pancreatitis   Anasarca  Transaminitis  Thyromegaly    Plan of Treatment  She appears to be stable respiratory wise.  10 been oxygen as tolerated.  Her saturations are presently  98%.  No discussions regarding possible palliative care.  Consider TPN if unable to take regular food soon.  Note generalized edema.  Consider stopping IV fluids and giving diuretics with appropriate adjustment in antihypertensives.    Lester Andino MD  03/13/17  12:51 PM    EMR Dragon/Transcription disclaimer:   Some of this note may be an electronic transcription/translation of spoken language to printed text. The electronic translation of spoken language may permit erroneous, or at times, nonsensical words or phrases to be inadvertently transcribed; Although I have reviewed the note for such errors, some may still exist.

## 2017-03-13 NOTE — PROGRESS NOTES
"        BGA/GI Progress Note   Chief Complaint:  Pancreatitis, elevated LFT's    Subjective     Interval History: c/o abd pain, difficult to tell whether it's from pancreatitis, hernia or just moaning.  Not taking much po per RN, doesn't like clear liquids.  No n/v, reported.  No BM since 3/9/17 documented.  Palliative consult today planned.    History taken from: patient chart RN    Review of Systems:    All systems were reviewed and negative except for:  Gastrointestinal: postitive for  pain    Objective     Vital Signs  Temp:  [97.1 °F (36.2 °C)-97.9 °F (36.6 °C)] 97.9 °F (36.6 °C)  Heart Rate:  [62-67] 67  Resp:  [16-18] 18  BP: ()/(57-65) 121/65  Body mass index is 28.12 kg/(m^2).    Intake/Output Summary (Last 24 hours) at 03/13/17 0846  Last data filed at 03/13/17 0608   Gross per 24 hour   Intake   1195 ml   Output    650 ml   Net    545 ml          Physical Exam:   General: elderly frail, chronically ill appearing female patient awake and alert resting in bed   Eyes: Normal lids and lashes, no scleral icterus, no conjunctival pallor   Neck: supple, normal ROM, no tracheal deviation   Skin: warm and dry, not jaundiced   Cardiovascular: regular rhythm and rate, no murmurs auscultated   Pulm: clear to auscultation bilaterally, regular and unlabored   Abdomen: soft, tender lower abdomen, nondistended; normal bowel sounds, abd binder in place   Rectal: deferred   Extremities: pitting edema bilateral LE's   Neurologic: depressed mood and behavior, states \"I want to die\"    All Medications Have Been Reviewed     Results Review:       Results from last 7 days  Lab Units 03/13/17  0740 03/11/17  0620 03/10/17  0859   WBC 10*3/mm3 12.39* 13.73* 15.87*   HEMOGLOBIN g/dL 10.2* 9.7* 9.7*   HEMATOCRIT % 32.2* 28.7* 29.3*   PLATELETS 10*3/mm3 361 385 369         Results from last 7 days  Lab Units 03/13/17  0740 03/12/17  0838 03/11/17  0620 03/10/17  0859   SODIUM mmol/L 138 140 137 138   POTASSIUM mmol/L 3.8 3.9 " 3.4* 4.2   CHLORIDE mmol/L 103 102 99 100   TOTAL CO2 mmol/L 17.4* 21.8* 22.4 18.2*   BUN mg/dL 85* 93* 94* 89*   CREATININE mg/dL 3.34* 3.64* 3.59* 3.68*   CALCIUM mg/dL 8.0* 8.5* 8.2* 9.0   BILIRUBIN mg/dL 0.4  --  0.3 0.3   ALK PHOS U/L 412*  --  390* 389*   ALT (SGPT) U/L 206*  --  218* 208*   AST (SGOT) U/L 170*  --  239* 257*   GLUCOSE mg/dL 94 101* 125* 61*         Results from last 7 days  Lab Units 03/10/17  0859   INR  1.18*       RADIOLOGY:    Imaging Results (last 72 hours)     ** No results found for the last 72 hours. **          Assessment/Plan     Patient Active Problem List   Diagnosis Code   • Atrial fibrillation I48.91   • Fall W19.XXXA   • Chronic kidney disease, stage IV (severe) N18.4   • Left bundle branch block (LBBB) I44.7   • Acute respiratory failure with hypoxia J96.01   • Hypertension I10   • COPD (chronic obstructive pulmonary disease) J44.9   • Acute kidney injury N17.9   • Weakness R53.1   • UTI (urinary tract infection) N39.0   • Abdominal wall hernia K43.9   • Clostridium difficile colitis A04.7   • Thrush B37.0   • HAP (hospital-acquired pneumonia) J18.9   • Influenza A J10.1   • Acute on chronic renal failure N17.9, N18.9   • Pneumonia due to infectious organism J18.9   • Sepsis A41.9   • Idiopathic acute pancreatitis K85.00       Eri JOE Sharma, APRN  03/13/17  8:46 AM    She awakens to voice.  Denies adb pain.  She is nauseated and reports emesis.  No BM charted since 2/9.  No appetite.    abd - slightly distended upper abdomen, +bs.  Nontender +bs    Labs reviewed    Impression:  1. Acute pancreatitis - etiology unclear, Multiple abx during course of her hospitalizaitons. Etiology likely multifactorial related to her multiple underlying illnessess/sepsis. Lipase stable. US with mild dilation of pancreatic duct, CBD 12mm post florin. Lipid panel neg. No ETOH  -tolerating clears,  IVFs and pain management per primary team  2. Elevated liver enzymes - Continued elevations,  possibly related to sepsis vs DILI. Hep panel negative, additional liver serology negative.  Consider MRCP when renal function amenable.  Since Cr does not significantly improved can consider MRCP w/o gadolinium.  Will await decision on Palliative prior to ordering MRCP.  3. Constipation- no BM documented since 3/9/17, little po intake.  Add daily stool softener and will give dulcolax today.  4. Leukocytosis- trending down    Plan:  - Consider MRCP w/o gadolinium pending Palliative consult decision today with family.   - Will advance to full liquids as she doesn't like clears.  - Check AM labs.  - Bowel regimen with daily stool softeners and dulcolax today

## 2017-03-13 NOTE — PLAN OF CARE
Problem: Patient Care Overview (Adult)  Goal: Plan of Care Review  Outcome: Ongoing (interventions implemented as appropriate)    03/13/17 1721   Coping/Psychosocial Response Interventions   Plan Of Care Reviewed With patient   Patient Care Overview   Progress progress toward functional goals as expected   Outcome Evaluation   Outcome Summary/Follow up Plan Pt family could not make appointment to talk with palliative care nurse today. scheduled for tomorrow.

## 2017-03-13 NOTE — PROGRESS NOTES
Continued Stay Note  [facility]     Patient Name: Clementina Betancur  MRN: [id number]  Today's Date: 3/13/2017    Admit Date: 3/3/2017          Discharge Plan       03/13/17 1611    Case Management/Social Work Plan    Plan Home with [facility] vs. Palliative    Patient/Family In Agreement With Plan yes    Additional Comments Family is trying to get schedule together to meet with Palliative.  Salma, RN, CCP              Discharge Codes     None            Minna Burkett RN

## 2017-03-14 NOTE — PROGRESS NOTES
"   LOS: 11 days   Patient Care Team:  Catrina Santiago MD as PCP - General (Internal Medicine)    Chief Complaint/ Reason for encounter: Acute renal failure, hypercalcemia  Chief Complaint   Patient presents with   • Weakness - Generalized   • Female Dysuria         Subjective     Weakness - Generalized   Associated symptoms include weakness. Pertinent negatives include no chest pain, fever or nausea.   Female Dysuria    Pertinent negatives include no nausea.       Subjective:  Symptoms:  Improved.  She reports weakness.  No shortness of breath, malaise or chest pain.  (She still will better today  Good appetite, no nausea  States her abdominal pain is much improved  Right now she tells me she is not agreeable to palliative care but wants to discuss further with her daughter).    Diet:  Poor intake.  No nausea.    Activity level: Impaired due to weakness.    Pain:  She complains of pain that is mild.  She reports pain is unchanged.  Pain is requiring pain medication and partially controlled.          History taken from: Patient and chart    Objective     Vital Signs  Temp:  [97.5 °F (36.4 °C)-97.9 °F (36.6 °C)] 97.9 °F (36.6 °C)  Heart Rate:  [58-64] 58  Resp:  [16-20] 16  BP: ()/(45-55) 98/45       Wt Readings from Last 1 Encounters:   03/14/17 0607 146 lb (66.2 kg)   03/13/17 0600 143 lb 15.4 oz (65.3 kg)   03/12/17 0600 137 lb 14.4 oz (62.6 kg)   03/11/17 0600 148 lb 4.8 oz (67.3 kg)   03/10/17 0044 138 lb 6.4 oz (62.8 kg)   03/09/17 0601 141 lb 12.8 oz (64.3 kg)   03/08/17 0300 145 lb (65.8 kg)   03/07/17 0500 146 lb (66.2 kg)   03/06/17 0600 120 lb (54.4 kg)   03/03/17 1756 118 lb (53.5 kg)       Objective:  General Appearance:  Comfortable, in no acute distress and not in pain (Chronically ill-appearing).    Vital signs: (most recent): Blood pressure 98/45, pulse 58, temperature 97.9 °F (36.6 °C), temperature source Oral, resp. rate 16, height 60\" (152.4 cm), weight 146 lb (66.2 kg), SpO2 100 %.  Vital " signs are normal.  No fever.    Output: Producing urine.    HEENT: Normal HEENT exam.    Lungs:  Tachypnea.  She is not in respiratory distress.  Breath sounds clear to auscultation.  There are decreased breath sounds.  No wheezes.    Heart: Normal rate.  Regular rhythm.  No murmur.   Abdomen: Abdomen is soft and non-distended.  Bowel sounds are normal.   There is epigastric tenderness. There is no suprapubic area tenderness.  (Less abdominal tenderness today).     Extremities: Normal range of motion.  There is dependent edema.  There is no deformity.    Pulses: Distal pulses are intact.    Neurological: Patient is alert and oriented to person, place and time.    Skin:  Warm and dry.  No rash or cyanosis.             Results Review:    Past Medical History: reviewed and updated  Past Medical History   Diagnosis Date   • Acute respiratory failure with hypoxia    • Arthritis    • Asthma    • Atrial fibrillation    • Cancer      multiple skincancers   • Candidal stomatitis    • CHF (congestive heart failure)    • Chronic kidney disease, stage 4, severely decreased GFR    • COPD (chronic obstructive pulmonary disease)    • Enterocolitis due to Clostridium difficile 12/29/2016     positive specimen collected on 12/29/2016   • History of falling    • History of transfusion      1989   • Hypertension    • Hypothyroidism    • Muscle weakness (generalized)    • Need for assistance with personal care    • Renal disorder    • Weakness          Allergies:  Allergies   Allergen Reactions   • Augmentin [Amoxicillin-Pot Clavulanate]    • Ceftin [Cefuroxime Axetil]    • Ciprofloxacin    • Colchicine    • Garlic    • Hydrocodone    • Rabeprazole    • Tetracyclines & Related        Intake/Output:     Intake/Output Summary (Last 24 hours) at 03/14/17 1058  Last data filed at 03/14/17 0859   Gross per 24 hour   Intake    480 ml   Output      0 ml   Net    480 ml         DATA:  Interval chart, labs and notes reviewed.    Labs:   Recent  Results (from the past 24 hour(s))   Comprehensive Metabolic Panel    Collection Time: 03/14/17  8:54 AM   Result Value Ref Range    Glucose 103 (H) 65 - 99 mg/dL    BUN 79 (H) 8 - 23 mg/dL    Creatinine 3.32 (H) 0.57 - 1.00 mg/dL    Sodium 140 136 - 145 mmol/L    Potassium 3.6 3.5 - 5.2 mmol/L    Chloride 103 98 - 107 mmol/L    CO2 21.1 (L) 22.0 - 29.0 mmol/L    Calcium 8.0 (L) 8.6 - 10.5 mg/dL    Total Protein 5.5 (L) 6.0 - 8.5 g/dL    Albumin 2.70 (L) 3.50 - 5.20 g/dL    ALT (SGPT) 201 (H) 1 - 33 U/L    AST (SGOT) 176 (H) 1 - 32 U/L    Alkaline Phosphatase 422 (H) 39 - 117 U/L    Total Bilirubin 0.4 0.1 - 1.2 mg/dL    eGFR Non African Amer 13 (L) >60 mL/min/1.73    Globulin 2.8 gm/dL    A/G Ratio 1.0 g/dL    BUN/Creatinine Ratio 23.8 7.0 - 25.0    Anion Gap 15.9 mmol/L   CBC Auto Differential    Collection Time: 03/14/17  8:54 AM   Result Value Ref Range    WBC 11.19 (H) 4.50 - 10.70 10*3/mm3    RBC 3.39 (L) 3.90 - 5.20 10*6/mm3    Hemoglobin 10.1 (L) 11.9 - 15.5 g/dL    Hematocrit 30.8 (L) 35.6 - 45.5 %    MCV 90.9 80.5 - 98.2 fL    MCH 29.8 26.9 - 32.0 pg    MCHC 32.8 32.4 - 36.3 g/dL    RDW 15.1 (H) 11.7 - 13.0 %    RDW-SD 50.0 37.0 - 54.0 fl    MPV 10.6 6.0 - 12.0 fL    Platelets 516 (H) 140 - 500 10*3/mm3    Neutrophil % 66.2 42.7 - 76.0 %    Lymphocyte % 11.6 (L) 19.6 - 45.3 %    Monocyte % 13.7 (H) 5.0 - 12.0 %    Eosinophil % 7.8 (H) 0.3 - 6.2 %    Basophil % 0.4 0.0 - 1.5 %    Immature Grans % 0.3 0.0 - 0.5 %    Neutrophils, Absolute 7.41 1.90 - 8.10 10*3/mm3    Lymphocytes, Absolute 1.30 0.90 - 4.80 10*3/mm3    Monocytes, Absolute 1.53 (H) 0.20 - 1.20 10*3/mm3    Eosinophils, Absolute 0.87 (H) 0.00 - 0.70 10*3/mm3    Basophils, Absolute 0.05 0.00 - 0.20 10*3/mm3    Immature Grans, Absolute 0.03 0.00 - 0.03 10*3/mm3   Phosphorus    Collection Time: 03/14/17  8:54 AM   Result Value Ref Range    Phosphorus 3.8 2.5 - 4.5 mg/dL            Medications have been reviewed:  Current Facility-Administered  Medications   Medication Dose Route Frequency Provider Last Rate Last Dose   • acetaminophen (TYLENOL) tablet 650 mg  650 mg Oral Q4H PRN Oleksandr Keyes MD       • amiodarone (PACERONE) tablet 200 mg  200 mg Oral BID Oleksandr Keyes MD   200 mg at 03/14/17 0813   • bisacodyl (DULCOLAX) suppository 10 mg  10 mg Rectal Once AFUA Kurtz   10 mg at 03/13/17 1334   • diltiaZEM CD (CARDIZEM CD) 24 hr capsule 120 mg  120 mg Oral Q24H Oleksandr Keyes MD   120 mg at 03/14/17 0813   • docusate sodium (COLACE) capsule 100 mg  100 mg Oral BID AFUA Kurtz   100 mg at 03/14/17 0813   • enoxaparin (LOVENOX) syringe 30 mg  30 mg Subcutaneous Daily Oleksandr Keyes MD   30 mg at 03/14/17 0813   • fluticasone (FLONASE) 50 MCG/ACT nasal spray 2 spray  2 spray Nasal Daily Oleksandr Keyes MD   2 spray at 03/14/17 0817   • Glycerin-Hypromellose- (ARTIFICIAL TEARS) 0.2-0.2-1 % ophthalmic solution solution 1 drop  1 drop Both Eyes Q1H PRN Oleksandr Keyes MD       • hydrALAZINE (APRESOLINE) tablet 75 mg  75 mg Oral TID Oleksandr Keyes MD   75 mg at 03/14/17 0813   • ipratropium-albuterol (DUO-NEB) nebulizer solution 3 mL  3 mL Nebulization Q4H PRN Oleksandr Keyes MD       • levothyroxine (SYNTHROID, LEVOTHROID) tablet 75 mcg  75 mcg Oral Q AM Oleksandr Keyes MD   75 mcg at 03/14/17 0511   • naloxone (NARCAN) injection 0.4 mg  0.4 mg Intravenous Q5 Min PRN Oleksandr Keyes MD       • nitroglycerin (NITROSTAT) SL tablet 0.4 mg  0.4 mg Sublingual Q5 Min PRN Oleksandr Keyes MD       • ondansetron (ZOFRAN) tablet 4 mg  4 mg Oral Q6H PRN Oleksandr Keyes MD        Or   • ondansetron ODT (ZOFRAN-ODT) disintegrating tablet 4 mg  4 mg Oral Q6H PRN Oleksandr Keyes MD        Or   • ondansetron (ZOFRAN) injection 4 mg  4 mg Intravenous Q6H PRN Oleksandr Keyes MD   4 mg at 03/11/17 2102   • oxyCODONE-acetaminophen (PERCOCET) 5-325 MG per tablet 1 tablet  1 tablet Oral Q4H PRN Otilio PATRICIA  MD [place]   1 tablet at 03/14/17 0509   • saccharomyces boulardii (FLORASTOR) capsule 250 mg  250 mg Oral BID Miguel Demarco MD   250 mg at 03/14/17 0813   • sennosides-docusate sodium (SENOKOT-S) 8.6-50 MG tablet 2 tablet  2 tablet Oral Nightly PRN Oleksandr Keyes MD       • sodium chloride 0.9 % flush 1-10 mL  1-10 mL Intravenous PRN Oleksandr Keyes MD       • sodium chloride 0.9 % flush 10 mL  10 mL Intravenous PRN Sanju Sharma MD           Assessment/Plan     Principal Problem:    Sepsis  Active Problems:    Atrial fibrillation    Left bundle branch block (LBBB)    Acute respiratory failure with hypoxia    COPD (chronic obstructive pulmonary disease)    UTI (urinary tract infection)    Acute on chronic renal failure    Pneumonia due to infectious organism    Idiopathic acute pancreatitis      Assessment:  (Acute renal failure, fairly stable   Hypercalcemia, better, status post Zometa  Chronic kidney disease stage IV , prior baseline creatinine around 2.5 but this might be new baseline creatinine for her  acute pancreatitis, possibly related to hypercalcemia,  , still some abdominal pain, tolerating clears well, better today  COPD  Acute hypoxemic respiratory failure  Abdominal pain, seems to be improved    Plan:  Overall she seems to be doing a bit better today  Her renal function is improved again  She is requesting a regular diet  Right now she tells me she is not agreeable to palliative care but might be if things were to suddenly get worse  Repeat Bumex times one dose today  Calcium remain stable, corrects to normal with low albumin  Continue her current blood pressure regimen and supportive care for now).             Continue to monitor renal function, electroytes and volume closely   Please call me with any questions or concerns      Filipe Feldman MD   [facility]   [phone]    03/14/17  10:58 AM        EMR Dragon/Transcription disclaimer:    Much of this encounter  note is an electronic transcription/translation of spoken language to printed text. The electronic translation of spoken language may permit erroneous, or at times, nonsensical words or phrases to be inadvertently transcribed; Although I have reviewed the note for such errors, some may still exist.

## 2017-03-14 NOTE — PLAN OF CARE
Problem: Patient Care Overview (Adult)  Goal: Plan of Care Review  Outcome: Ongoing (interventions implemented as appropriate)    03/14/17 1619   Coping/Psychosocial Response Interventions   Plan Of Care Reviewed With patient   Outcome Evaluation   Outcome Summary/Follow up Plan pt presents with significant weakness and decreased functional mobility, she had good effort with PT today and will benefit from PT to cont to work on strengthening and improved independence with mobility. Rec rehab at d/c          Problem: Inpatient Physical Therapy  Goal: Bed Mobility Goal LTG- PT  Outcome: Ongoing (interventions implemented as appropriate)    03/14/17 1619   Bed Mobility PT LTG   Bed Mobility PT LTG, Date Established 03/14/17   Bed Mobility PT LTG, Time to Achieve 1 wk   Bed Mobility PT LTG, Activity Type supine to sit/sit to supine   Bed Mobility PT LTG, [place] Level minimum assist (75% patient effort)       Goal: Transfer Training Goal 1 LTG- PT  Outcome: Ongoing (interventions implemented as appropriate)    03/14/17 1619   Transfer Training PT LTG   Transfer Training PT LTG, Date Established 03/14/17   Transfer Training PT LTG, Time to Achieve 1 wk   Transfer Training PT LTG, [place] Level minimum assist (75% patient effort)   Transfer Training PT LTG, Assist Device walker, rolling       Goal: Gait Training Goal LTG- PT  Outcome: Ongoing (interventions implemented as appropriate)    03/14/17 1619   Gait Training PT LTG   Gait Training Goal PT LTG, Date Established 03/14/17   Gait Training Goal PT LTG, Time to Achieve 1 wk   Gait Training Goal PT LTG, [place] Level moderate assist (50% patient effort)   Gait Training Goal PT LTG, Assist Device walker, rolling   Gait Training Goal PT LTG, Distance to Achieve 20 ft

## 2017-03-14 NOTE — PROGRESS NOTES
"[facility]     LOS: 11 days     Name: Clementina Betancur  Age: 89 y.o.  Sex: female  :  1927  MRN: [id number]         Primary Care Physician: Catrina Santiago MD    Subjective   She feels much better today, tolerating diet, abd pain nearly gone, no soa or chest pain    Objective   Temp:  [97.5 °F (36.4 °C)-97.9 °F (36.6 °C)] 97.5 °F (36.4 °C)  Heart Rate:  [58-67] 67  Resp:  [16-20] 16  BP: ()/(45-65) 125/65  Body mass index is 28.51 kg/(m^2).  Diet Regular; Low Fat    Objective:  General Appearance:  In no acute distress, ill-appearing and comfortable.    Vital signs: (most recent): Blood pressure 125/65, pulse 67, temperature 97.5 °F (36.4 °C), temperature source Oral, resp. rate 16, height 60\" (152.4 cm), weight 146 lb (66.2 kg), SpO2 98 %.    Lungs:  Normal respiratory rate and normal effort.  She is not in respiratory distress.  There are decreased breath sounds.  No wheezes.    Heart: Normal rate.  Regular rhythm.    Abdomen: Abdomen is soft.  (Has an abdominal binder)There is generalized tenderness.  (Mild pain with palpation).     Extremities: There is dependent edema (2+).    Neurological: Patient is alert.    Skin:  Warm and dry.          Results Review:    Reviewed medications and new clinical results    amiodarone 200 mg Oral BID   bisacodyl 10 mg Rectal Once   bumetanide 0.5 mg Oral Once   diltiaZEM  mg Oral Q24H   docusate sodium 100 mg Oral BID   enoxaparin 30 mg Subcutaneous Daily   fluticasone 2 spray Nasal Daily   hydrALAZINE 75 mg Oral TID   levothyroxine 75 mcg Oral Q AM   saccharomyces boulardii 250 mg Oral BID          Results from last 7 days  Lab Units 17  0854 17  0740 17  0620 03/10/17  0859 17  0737 17  0659   WBC 10*3/mm3 11.19* 12.39* 13.73* 15.87* 13.32* 14.92*   HEMOGLOBIN g/dL 10.1* 10.2* 9.7* 9.7* 9.1* 10.2*   PLATELETS 10*3/mm3 516* 361 385 369 346 391       Results from last 7 " days  Lab Units 03/14/17  0854 03/13/17  0740 03/12/17  0838 03/11/17  0620 03/10/17  0859 03/09/17  0737 03/08/17  0714   SODIUM mmol/L 140 138 140 137 138 138 139   POTASSIUM mmol/L 3.6 3.8 3.9 3.4* 4.2 4.3 4.3   CHLORIDE mmol/L 103 103 102 99 100 101 102   TOTAL CO2 mmol/L 21.1* 17.4* 21.8* 22.4 18.2* 19.0* 21.1*   BUN mg/dL 79* 85* 93* 94* 89* 84* 81*   CREATININE mg/dL 3.32* 3.34* 3.64* 3.59* 3.68* 3.13* 3.12*   CALCIUM mg/dL 8.0* 8.0* 8.5* 8.2* 9.0 9.3 11.0*   GLUCOSE mg/dL 103* 94 101* 125* 61* 64* 67       Results from last 7 days  Lab Units 03/10/17  0859   AMYLASE U/L 37       0  Lab Value Date/Time   LIPASE 63 (H) 03/10/2017 0859   LIPASE 51 03/09/2017 0737   LIPASE 95 (H) 03/08/2017 0714   LIPASE 534 (H) 03/07/2017 0600       Results from last 7 days  Lab Units 03/14/17  0854 03/13/17  0740 03/11/17  0620   ALK PHOS U/L 422* 412* 390*   BILIRUBIN mg/dL 0.4 0.4 0.3   BILIRUBIN DIRECT mg/dL  --  0.2  --    ALT (SGPT) U/L 201* 206* 218*   AST (SGOT) U/L 176* 170* 239*     Estimated Creatinine Clearance: 9.8 mL/min (by C-G formula based on Cr of 3.32).    Assessment/Plan   Active Hospital Problems (** Indicates Principal Problem)    Diagnosis Date Noted   • **Sepsis [A41.9] 03/04/2017   • Idiopathic acute pancreatitis [K85.00] 03/07/2017   • Pneumonia due to infectious organism [J18.9] 03/04/2017   • Acute on chronic renal failure [N17.9, N18.9] 03/03/2017   • UTI (urinary tract infection) [N39.0] 12/28/2016   • Left bundle branch block (LBBB) [I44.7] 12/27/2016   • COPD (chronic obstructive pulmonary disease) [J44.9] 12/27/2016   • Acute respiratory failure with hypoxia [J96.01] 12/27/2016   • Atrial fibrillation [I48.91] 12/26/2016      Resolved Hospital Problems    Diagnosis Date Noted Date Resolved   No resolved problems to display.     · Possible MRCP (without contrast) per GI depending on goals of care for LFTs  · Palliative care to see tomorrow  · Per pt, feels much better and may not want to talk to  palliative but I'd still like them to talk to the pt and her daughter  · IV fluids stopped, diuretics per nephrology. SOA better  · Still FULL code  · H/o c. Diff, watch out for diarrhea, BM x 1 today  Jagjit Becerril MD   03/14/17  2:33 PM

## 2017-03-14 NOTE — PROGRESS NOTES
[facility]/GI Progress Note   Chief Complaint:  pancreatitis    Subjective     Interval History: No co's of abd pain this am, more awake and animated than yesterday.  Reports several BM's after supp yesterday and verified with nursing.  Family to discuss Palliative today.    History taken from: patient chart RN    Review of Systems:    All systems were reviewed and negative except for:  Gastrointestinal: postitive for  nausea    Objective     Vital Signs  Temp:  [97.5 °F (36.4 °C)-97.9 °F (36.6 °C)] 97.9 °F (36.6 °C)  Heart Rate:  [58-64] 58  Resp:  [16-20] 16  BP: ()/(45-55) 98/45  Body mass index is 28.51 kg/(m^2).    Intake/Output Summary (Last 24 hours) at 03/14/17 0840  Last data filed at 03/13/17 1819   Gross per 24 hour   Intake    240 ml   Output      0 ml   Net    240 ml          Physical Exam:   General: patient awake, alert and cooperative.  Sitting up in bed, no distress   Eyes: Normal lids and lashes, no scleral icterus   Neck: supple, normal ROM, no tracheal deviation   Skin: warm and dry, not jaundiced   Cardiovascular: regular rhythm and rate, no murmurs auscultated   Pulm: clear to auscultation bilaterally, regular and unlabored   Abdomen: soft, nontender, nondistended; normal bowel sounds, abd binder in place but poorly positioned   Rectal: deferred   Extremities: trace edema bilateral LE's   Neurologic: Normal mood and behavior, less depressed and more animated/engaged this am.     All Medications Have Been Reviewed     Results Review:       Results from last 7 days  Lab Units 03/13/17  0740 03/11/17  0620 03/10/17  0859   WBC 10*3/mm3 12.39* 13.73* 15.87*   HEMOGLOBIN g/dL 10.2* 9.7* 9.7*   HEMATOCRIT % 32.2* 28.7* 29.3*   PLATELETS 10*3/mm3 361 385 369         Results from last 7 days  Lab Units 03/13/17  0740 03/12/17  0838 03/11/17  0620 03/10/17  0859   SODIUM mmol/L 138 140 137 138   POTASSIUM mmol/L 3.8 3.9 3.4* 4.2   CHLORIDE mmol/L 103 102 99 100   TOTAL CO2 mmol/L 17.4* 21.8* 22.4  18.2*   BUN mg/dL 85* 93* 94* 89*   CREATININE mg/dL 3.34* 3.64* 3.59* 3.68*   CALCIUM mg/dL 8.0* 8.5* 8.2* 9.0   BILIRUBIN mg/dL 0.4  --  0.3 0.3   ALK PHOS U/L 412*  --  390* 389*   ALT (SGPT) U/L 206*  --  218* 208*   AST (SGOT) U/L 170*  --  239* 257*   GLUCOSE mg/dL 94 101* 125* 61*         Results from last 7 days  Lab Units 03/10/17  0859   INR  1.18*       RADIOLOGY:    Imaging Results (last 72 hours)     ** No results found for the last 72 hours. **          Assessment/Plan     Patient Active Problem List   Diagnosis Code   • Atrial fibrillation I48.91   • Fall W19.XXXA   • Chronic kidney disease, stage IV (severe) N18.4   • Left bundle branch block (LBBB) I44.7   • Acute respiratory failure with hypoxia J96.01   • Hypertension I10   • COPD (chronic obstructive pulmonary disease) J44.9   • Acute kidney injury N17.9   • Weakness R53.1   • UTI (urinary tract infection) N39.0   • Abdominal wall hernia K43.9   • Clostridium difficile colitis A04.7   • Thrush B37.0   • HAP (hospital-acquired pneumonia) J18.9   • Influenza A J10.1   • Acute on chronic renal failure N17.9, N18.9   • Pneumonia due to infectious organism J18.9   • Sepsis A41.9   • Idiopathic acute pancreatitis K85.00       Eri Sharma, APRN  03/14/17  8:40 AM      We are following for pancreatitis    She does look better today, denies abdominal pain.  No nausea or vomiting.    Constitutional: She is oriented to person, place, and time. She appears well-developed and well-nourished.   HENT: anicteric, no thyromegaly  Head: Normocephalic and atraumatic.   Eyes: Conjunctivae and EOM are normal.   Neck: Normal range of motion. No tracheal deviation present. Cardiovascular: Normal rate and regular rhythm.    Pulmonary/Chest: Effort normal and breath sounds normal. No respiratory distress.   Abdominal: Soft. Bowel sounds are normal. She exhibits no distension and no mass. There is no tenderness. There is no rebound and no guarding.  "  Musculoskeletal: Normal range of motion.   Neurological: She is alert and oriented to person, place, and time.   Skin: Skin is warm and dry.   Psychiatric: She has a normal mood and affect. Judgment normal.       Impression:  1. Acute pancreatitis - etiology unclear, Multiple abx during course of her hospitalizaitons. Etiology likely multifactorial related to her multiple underlying illnessess/sepsis. Lipase stable. US with mild dilation of pancreatic duct, CBD 12mm post florin. Lipid panel neg. No ETOH  -tolerating full liquids but little po intake per RN  2. Elevated liver enzymes - Continued elevations, possibly related to sepsis vs DILI. Hep panel negative, additional liver serology negative.  Consider MRCP when renal function amenable.  Since Cr is not improved can consider MRCP w/o gadolinium.  Will await decision on Palliative prior to ordering MRCP.  3. Constipation- Several bm's yesterday after suppository, continue bowel regimen  4. Leukocytosis- trending down    - Consider MRCP w/o gadolinium pending Palliative consult decision today with family.   - Will advance to low fat diet as she doesn't like full liquid \"mush\".  - AM labs pending      "

## 2017-03-14 NOTE — PLAN OF CARE
Problem: Patient Care Overview (Adult)  Goal: Plan of Care Review  Outcome: Ongoing (interventions implemented as appropriate)    03/14/17 1605   Coping/Psychosocial Response Interventions   Plan Of Care Reviewed With patient   Patient Care Overview   Progress improving   Outcome Evaluation   Outcome Summary/Follow up Plan Pt resting comfortably. No complications this shift. Pos d/c to [facility].

## 2017-03-14 NOTE — PLAN OF CARE
Problem: Patient Care Overview (Adult)  Goal: Plan of Care Review  Outcome: Ongoing (interventions implemented as appropriate)    03/14/17 0131   Coping/Psychosocial Response Interventions   Plan Of Care Reviewed With patient   Patient Care Overview   Progress progress toward functional goals as expected       Goal: Discharge Needs Assessment  Outcome: Ongoing (interventions implemented as appropriate)    03/07/17 1623 03/14/17 0131   Discharge Needs Assessment   Concerns To Be Addressed --  basic needs concerns   Readmission Within The Last 30 Days --  no previous admission in last 30 days   Equipment Needed After Discharge --  none   Discharge Disposition --  still a patient   Current Health   Anticipated Changes Related to Illness --  inability to care for self   Living Environment   Transportation Available --  car;family or friend will provide   Self-Care   Equipment Currently Used at Home walker, jonny;cane, straight --          Problem: Pain, Acute (Adult)  Intervention: Monitor/Manage Analgesia    03/13/17 2112 03/14/17 0020   Safety Interventions   Medication Review/Management medications reviewed --    Manage Acute Burn Pain   Pain Management Interventions --  relaxation techniques promoted;pillow support       Intervention: Mutually Develop/Implement Acute Pain Management Plan    03/14/17 0020 03/14/17 0131   Cognitive Interventions   Sensory Stimulation Regulation --  care clustered;quiet environment promoted   Manage Acute Burn Pain   Pain Management Interventions relaxation techniques promoted;pillow support --        Intervention: Support/Optimize Psychosocial Response to Acute Pain    03/13/17 2112   Coping Strategies   Trust Relationship/Rapport care explained   Supportive Measures active listening utilized;verbalization of feelings encouraged         Goal: Acceptable Pain Control/Comfort Level  Outcome: Ongoing (interventions implemented as appropriate)    03/14/17 0131   Pain, Acute (Adult)    Acceptable Pain Control/Comfort Level making progress toward outcome         Problem: Fall Risk (Adult)  Intervention: Monitor/Assist with Self Care    03/13/17 2112 03/14/17 0020 03/14/17 0131   Activity   Activity Type --  activity adjusted per tolerance --    Activity Level of Assistance --  assistance, 1 person --    Musculoskeletal Interventions   Self-Care Promotion --  --  independence encouraged   Monitor/Assist with Self Care   Ambulation --  4-->completely dependent --    Transferring --  4-->completely dependent --    Toileting --  4-->completely dependent --    Bathing --  4-->completely dependent --    Dressing --  4-->completely dependent --    Eating --  1-->assistive equipment --    Communication --  0-->understands/communicates without difficulty --    Swallowing (if score 2 or more for any item, consult Rehab Services) 0-->swallows foods/liquids without difficulty --  --          Goal: Absence of Falls  Outcome: Ongoing (interventions implemented as appropriate)    03/14/17 0131   Fall Risk (Adult)   Absence of Falls making progress toward outcome

## 2017-03-14 NOTE — PROGRESS NOTES
[facility]  Phone: [phone]  Lester Andino MD    Subjective:  LOS: 11    She is sitting up and looks cheerful.  She feels better today than at any other time.  She was on oxygen when I walked in but her saturations on room air are 95%.    Objective   Vital Signs past 24hrs  BP range: BP: ()/(45-65) 125/65  Pulse range: Heart Rate:  [58-67] 67  Resp rate range: Resp:  [16-20] 16  Temp range: Temp (24hrs), Av.7 °F (36.5 °C), Min:97.5 °F (36.4 °C), Max:97.9 °F (36.6 °C)    O2 Device: nasal cannula with humidificationFlow (L/min):  [3] 3  Oxygen range:SpO2:  [96 %-100 %] 98 %   146 lb (66.2 kg); Body mass index is 28.51 kg/(m^2).    Intake/Output Summary (Last 24 hours) at 17 1541  Last data filed at 17 1249   Gross per 24 hour   Intake    580 ml   Output      0 ml   Net    580 ml       Physical Exam   Cardiovascular: Normal rate and regular rhythm.    No murmur heard.  Pulmonary/Chest: She has decreased breath sounds. She has no wheezes. She has no rales.   Abdominal: Soft. Bowel sounds are normal. She exhibits no distension and no mass. There is tenderness.   Binder for ventral hernia   Musculoskeletal: She exhibits edema (mild generalized edema ).   Neurological: She is alert.     Results Review:    I have reviewed the laboratory and imaging data since the last note by [facility] physician.  My annotations are noted in assessment and plan.    Medication Review:  I have reviewed the current MAR.  My annotations are noted in assessment and plan.       Plan   PCCM Problems  Sepsis resolved  Bibasilar pneumonia - resolved   COPD  Acute hypoxemia with respiratory failure  Relevant Medical Diagnoses  UTI - treated  Recent severe C. difficile colitis  Acute kidney injury  Acute Pancreatitis   Anasarca  Transaminitis  Thyromegaly    Plan of Treatment  She remains stable.  I will try her off the oxygen.  She feels better today.  Note plans for [facility]. No objection to discharge.    Lester Andino  MD  03/14/17  3:41 PM    EMR Dragon/Transcription disclaimer:   Some of this note may be an electronic transcription/translation of spoken language to printed text. The electronic translation of spoken language may permit erroneous, or at times, nonsensical words or phrases to be inadvertently transcribed; Although I have reviewed the note for such errors, some may still exist.

## 2017-03-14 NOTE — PROGRESS NOTES
Acute Care - Physical Therapy Initial Evaluation  [facility]     Patient Name: Clementina Betancur  : 1927  MRN: [id number]  Today's Date: 3/14/2017   Onset of Illness/Injury or Date of Surgery Date: 17  Date of Referral to PT: 17  Referring Physician: Jerrica      Admit Date: 3/3/2017     Visit Dx:    ICD-10-CM ICD-9-CM   1. Acute on chronic renal failure N17.9 584.9    N18.9 585.9   2. Hyperkalemia E87.5 276.7   3. Hypercalcemia E83.52 275.42   4. Metabolic encephalopathy G93.41 348.31   5. General weakness R53.1 780.79     Patient Active Problem List   Diagnosis   • Atrial fibrillation   • Fall   • Chronic kidney disease, stage IV (severe)   • Left bundle branch block (LBBB)   • Acute respiratory failure with hypoxia   • Hypertension   • COPD (chronic obstructive pulmonary disease)   • Acute kidney injury   • Weakness   • UTI (urinary tract infection)   • Abdominal wall hernia   • Clostridium difficile colitis   • Thrush   • HAP (hospital-acquired pneumonia)   • Influenza A   • Acute on chronic renal failure   • Pneumonia due to infectious organism   • Sepsis   • Idiopathic acute pancreatitis     Past Medical History   Diagnosis Date   • Acute respiratory failure with hypoxia    • Arthritis    • Asthma    • Atrial fibrillation    • Cancer      multiple skincancers   • Candidal stomatitis    • CHF (congestive heart failure)    • Chronic kidney disease, stage 4, severely decreased GFR    • COPD (chronic obstructive pulmonary disease)    • Enterocolitis due to Clostridium difficile 2016     positive specimen collected on 2016   • History of falling    • History of transfusion         • Hypertension    • Hypothyroidism    • Muscle weakness (generalized)    • Need for assistance with personal care    • Renal disorder    • Weakness      Past Surgical History   Procedure Laterality Date   • Hernia repair     • Colectomy partial / total Right    • Hysterectomy     • Abdominal  surgery     • Appendectomy     • Cholecystectomy Right    • Winter's neuroma excision Right    • Skin cancer excision     • Bronchoscopy N/A 1/12/2017     Procedure: BRONCHOSCOPY  WITH ANESTHESIA   with Bilateral  Lung  [place];  Surgeon: Lester Andino MD;  Location: [facility] ENDOSCOPY;  Service:           PT ASSESSMENT (last 72 hours)      PT Evaluation       03/14/17 1613 03/14/17 0131    Rehab Evaluation    Document Type evaluation  -PC     Subjective Information agree to therapy  -PC     Patient Effort, Rehab Treatment good  -PC     Symptoms Noted During/After Treatment none  -PC     General Information    Patient Profile Review yes  -PC     Onset of Illness/Injury or Date of Surgery Date 03/03/17  -PC     Referring Physician Jerrica  -PC     General Observations pt is an elderly female in bed, no acute distress  -PC     Pertinent History Of Current Problem TME, sepsis, PNA, acute  hypoxic resp failure, acute renal failure, UTI  -PC     Precautions/Limitations fall precautions  -PC     Prior Level of Function --   recent stay at [facility], used wx, unsure of level of assist  -PC     Plans/Goals Discussed With patient  -PC     Barriers to Rehab medically complex;previous functional deficit  -PC     Living Environment    Transportation Available  car;family or friend will provide  -    Clinical Impression    Date of Referral to PT 03/14/17  -PC     Patient/Family Goals Statement get stronger to use walker in house  -PC     Criteria for Skilled Therapeutic Interventions Met yes;treatment indicated  -PC     Rehab Potential fair, will monitor progress closely  -     Pain Assessment    Pain Assessment 0-10  -PC     Pain Score 3  -PC     Pain Location Generalized  -PC     Cognitive Assessment/Intervention    Current Cognitive/Communication Assessment functional  -PC     Orientation Status oriented x 4  -PC     Follows Commands/Answers Questions able to follow single-step instructions;needs cueing;100% of the time  -      Personal Safety mild impairment  -PC     Personal Safety Interventions fall prevention program maintained;gait belt  -PC     ROM (Range of Motion)    General ROM Detail BUE shoulder ROM deficit, L>R , heel cord tightness  -PC     MMT (Manual Muscle Testing)    General MMT Assessment Detail BUE 3-/5, BLE 3/5  -PC     Bed Mobility, Assessment/Treatment    Bed Mob, Supine to Sit, [place] moderate assist (50% patient effort);2 person assist required  -PC     Bed Mob, Sit to Supine, [place] moderate assist (50% patient effort);2 person assist required  -PC     Transfer Assessment/Treatment    Transfers, Sit-Stand [place] moderate assist (50% patient effort);2 person assist required  -PC     Transfers, Stand-Sit [place] moderate assist (50% patient effort);2 person assist required  -PC     Gait Assessment/Treatment    Gait, Comment unable to take a step  -PC     Positioning and Restraints    Pre-Treatment Position in bed  -PC     Post Treatment Position bed  -PC     In Bed supine;call light within reach;encouraged to call for assist;exit alarm on  -PC       User Key  (r) = Recorded By, (t) = Taken By, (c) = Cosigned By    Initials Name Provider Type    PC Vee Ku PT Physical Therapist     Meenu Hathaway, RN Registered Nurse          Physical Therapy Education     Title: PT OT SLP Therapies (Active)     Topic: Physical Therapy (Active)     Point: Mobility training (Active)    Learning Progress Summary    Learner Readiness Method Response Comment Documented by Status   Patient Acceptance E,D AVANI  PC 03/14/17 6328 Active               Point: Home exercise program (Active)    Learning Progress Summary    Learner Readiness Method Response Comment Documented by Status   Patient Acceptance HARSHAD,YURIY ARGUETA 03/14/17 1618 Active               Point: Body mechanics (Active)    Learning Progress Summary    Learner Readiness Method Response Comment Documented by Status   Patient Acceptance E,D NR  KENDALL  03/14/17 1618 Active               Point: Precautions (Active)    Learning Progress Summary    Learner Readiness Method Response Comment Documented by Status   Patient Acceptance E,D NR  PC 03/14/17 1618 Active                      User Key     Initials Effective Dates Name Provider Type Discipline    PC 12/01/15 -  Vee Ku, PT Physical Therapist PT                PT Recommendation and Plan  Anticipated Discharge Disposition: skilled nursing facility  Planned Therapy Interventions: balance training, bed mobility training, gait training, home exercise program, strengthening, transfer training  PT Frequency: daily  Plan of Care Review  Plan Of Care Reviewed With: patient  Outcome Summary/Follow up Plan: pt presents with significant weakness and decreased functional mobility, she had good effort with PT today and will benefit from PT to cont to work on strengthening and improved independence with mobility.  Rec rehab at d/c           IP PT Goals       03/14/17 1619          Bed Mobility PT LTG    Bed Mobility PT LTG, Date Established 03/14/17  -PC      Bed Mobility PT LTG, Time to Achieve 1 wk  -PC      Bed Mobility PT LTG, Activity Type supine to sit/sit to supine  -PC      Bed Mobility PT LTG, [place] Level minimum assist (75% patient effort)  -PC      Transfer Training PT LTG    Transfer Training PT LTG, Date Established 03/14/17  -PC      Transfer Training PT LTG, Time to Achieve 1 wk  -PC      Transfer Training PT LTG, [place] Level minimum assist (75% patient effort)  -PC      Transfer Training PT LTG, Assist Device walker, rolling  -PC      Gait Training PT LTG    Gait Training Goal PT LTG, Date Established 03/14/17  -PC      Gait Training Goal PT LTG, Time to Achieve 1 wk  -PC      Gait Training Goal PT LTG, [place] Level moderate assist (50% patient effort)  -PC      Gait Training Goal PT LTG, Assist Device walker, rolling  -PC      Gait Training Goal PT LTG, Distance to Achieve  20 ft   -PC        User Key  (r) = Recorded By, (t) = Taken By, (c) = Cosigned By    Initials Name Provider Type    PC Vee Ku PT Physical Therapist                Outcome Measures       03/14/17 1600          How much help from another person do you currently need...    Turning from your back to your side while in flat bed without using bedrails? 2  -PC      Moving from lying on back to sitting on the side of a flat bed without bedrails? 2  -PC      Moving to and from a bed to a chair (including a wheelchair)? 2  -PC      Standing up from a chair using your arms (e.g., wheelchair, bedside chair)? 2  -PC      Climbing 3-5 steps with a railing? 1  -PC      To walk in hospital room? 1  -PC      AM-PAC 6 Clicks Score 10  -PC      Functional Assessment    Outcome Measure Options AM-PAC 6 Clicks Basic Mobility (PT)  -PC        User Key  (r) = Recorded By, (t) = Taken By, (c) = Cosigned By    Initials Name Provider Type    PC Vee Ku PT Physical Therapist           Time Calculation:         PT Charges       03/14/17 1622          Time Calculation    Start Time 1540  -PC      Stop Time 1610  -PC      Time Calculation (min) 30 min  -PC      PT Received On 03/14/17  -PC      PT - Next Appointment 03/15/17  -PC      PT Goal Re-Cert Due Date 03/21/17  -PC        User Key  (r) = Recorded By, (t) = Taken By, (c) = Cosigned By    Initials Name Provider Type    PC Vee Ku PT Physical Therapist          Therapy Charges for Today     Code Description Service Date Service Provider Modifiers Qty    58156888573 HC PT EVAL HIGH COMPLEXITY 2 3/14/2017 Vee Ku, PT GP 1    83262046271 HC PT THER PROC EA 15 MIN 3/14/2017 Vee Ku, PT GP 2    24763972044 HC PT THER SUPP EA 15 MIN 3/14/2017 Vee Ku, PT GP 1          PT G-Codes  Outcome Measure Options: AM-PAC 6 Clicks Basic Mobility (PT)      Vee Ku PT  3/14/2017

## 2017-03-14 NOTE — PROGRESS NOTES
Continued Stay Note  [facility]     Patient Name: Clementina Betancur  MRN: [id number]  Today's Date: 3/14/2017    Admit Date: 3/3/2017          Discharge Plan       03/14/17 1451    Case Management/Social Work Plan    Plan [place] at d/c    Patient/Family In Agreement With Plan yes    Additional Comments Per PRISCILLA Brito daughter has decided to send patient back to [facility], at d/c.  Called and left message for Mary/[place] re: patient wanting to return.  Salma, RN, CCP              Discharge Codes     None            Minna Burkett RN

## 2017-03-15 PROBLEM — Z66 DNAR (DO NOT ATTEMPT RESUSCITATION): Status: ACTIVE | Noted: 2017-01-01

## 2017-03-15 NOTE — PLAN OF CARE
Problem: Patient Care Overview (Adult)  Goal: Plan of Care Review  Outcome: Ongoing (interventions implemented as appropriate)    03/15/17 0411   Coping/Psychosocial Response Interventions   Plan Of Care Reviewed With patient   Patient Care Overview   Progress improving   Outcome Evaluation   Outcome Summary/Follow up Plan pt medicated for pain/nausea as needed. no s/s of acute distress noted. abdominal binder in place r/t hernia. will continue to monitor pt.        Goal: Adult Individualization and Mutuality  Outcome: Ongoing (interventions implemented as appropriate)  Goal: Discharge Needs Assessment  Outcome: Ongoing (interventions implemented as appropriate)    Problem: Renal Failure/Kidney Injury, Acute (Adult)  Goal: Signs and Symptoms of Listed Potential Problems Will be Absent or Manageable (Renal Failure/Kidney Injury, Acute)  Outcome: Ongoing (interventions implemented as appropriate)    Problem: Pain, Acute (Adult)  Goal: Acceptable Pain Control/Comfort Level  Outcome: Ongoing (interventions implemented as appropriate)    Problem: Fall Risk (Adult)  Goal: Absence of Falls  Outcome: Ongoing (interventions implemented as appropriate)    Problem: Skin Integrity Impairment, Risk/Actual (Adult)  Goal: Skin Integrity/Wound Healing  Outcome: Ongoing (interventions implemented as appropriate)

## 2017-03-15 NOTE — PROGRESS NOTES
"        BGA/GI Progress Note   Chief Complaint:  Pancreatitis, abd pain    Subjective     Interval History: Still with some upper abd pain to exam, no reports of vomiting.  States \"bad night\", reason given \"couldn't catch my breath\".  Tolerating diet but little po intake.  Plans for MRCP w/o gadolinium today with elevated renal function.    History taken from: patient chart    Review of Systems:    All systems were reviewed and negative except for:  Gastrointestinal: postitive for  pain    Objective     Vital Signs  Temp:  [97.5 °F (36.4 °C)-98.2 °F (36.8 °C)] 98.2 °F (36.8 °C)  Heart Rate:  [62-72] 62  Resp:  [16-18] 18  BP: (115-125)/(55-90) 115/60  Body mass index is 26.95 kg/(m^2).    Intake/Output Summary (Last 24 hours) at 03/15/17 0858  Last data filed at 03/14/17 2342   Gross per 24 hour   Intake    680 ml   Output    550 ml   Net    130 ml          Physical Exam:   General: elderly frail, chronically ill appearing female patient awake, alert and cooperative, resting in bed, no distress   Eyes: Normal lids and lashes, no scleral icterus, no conjunctival pallor   Neck: supple, normal ROM, no tracheal deviation   Skin: warm and dry, not jaundiced   Cardiovascular: regular rhythm and rate, no murmurs auscultated   Pulm: clear to auscultation bilaterally, regular and unlabored   Abdomen: soft, upper abd tenderness, nondistended; normal bowel sounds, abd binder in place for hernia   Rectal: deferred   Extremities: edema bilateral LE's   Neurologic: depressed mood and behavior    All Medications Have Been Reviewed     Results Review:       Results from last 7 days  Lab Units 03/14/17  0854 03/13/17  0740 03/11/17  0620   WBC 10*3/mm3 11.19* 12.39* 13.73*   HEMOGLOBIN g/dL 10.1* 10.2* 9.7*   HEMATOCRIT % 30.8* 32.2* 28.7*   PLATELETS 10*3/mm3 516* 361 385         Results from last 7 days  Lab Units 03/15/17  0728 03/14/17  0854 03/13/17  0740   SODIUM mmol/L 139 140 138   POTASSIUM mmol/L 3.8 3.6 3.8   CHLORIDE " mmol/L 103 103 103   TOTAL CO2 mmol/L 22.8 21.1* 17.4*   BUN mg/dL 75* 79* 85*   CREATININE mg/dL 3.48* 3.32* 3.34*   CALCIUM mg/dL 8.1* 8.0* 8.0*   BILIRUBIN mg/dL 0.4 0.4 0.4   ALK PHOS U/L 409* 422* 412*   ALT (SGPT) U/L 169* 201* 206*   AST (SGOT) U/L 125* 176* 170*   GLUCOSE mg/dL 93 103* 94         Results from last 7 days  Lab Units 03/10/17  0859   INR  1.18*       RADIOLOGY:    Imaging Results (last 72 hours)     ** No results found for the last 72 hours. **          Assessment/Plan     Patient Active Problem List   Diagnosis Code   • Atrial fibrillation I48.91   • Fall W19.XXXA   • Chronic kidney disease, stage IV (severe) N18.4   • Left bundle branch block (LBBB) I44.7   • Acute respiratory failure with hypoxia J96.01   • Hypertension I10   • COPD (chronic obstructive pulmonary disease) J44.9   • Acute kidney injury N17.9   • Weakness R53.1   • UTI (urinary tract infection) N39.0   • Abdominal wall hernia K43.9   • Clostridium difficile colitis A04.7   • Thrush B37.0   • HAP (hospital-acquired pneumonia) J18.9   • Influenza A J10.1   • Acute on chronic renal failure N17.9, N18.9   • Pneumonia due to infectious organism J18.9   • Sepsis A41.9   • Idiopathic acute pancreatitis K85.00       Eri Sharma, APRN  03/15/17  8:58 AM    Impression/Recommendations:  1. Acute pancreatitis - abd pain improving.  Etiology unclear, Multiple abx during course of her hospitalizaitons. Etiology likely multifactorial related to her multiple underlying illnessess/sepsis. Lipase stable. US with mild dilation of pancreatic duct, CBD 12mm post florin. Lipid panel neg. No ETOH  -tolerating diet but little po intake per RN  2. Elevated liver enzymes - Continued elevations, possibly related to sepsis vs DILI. Hep panel negative, additional liver serology negative.  The patient and her daughter decided that they did not want an MRCP. Considering Palliative Care?  3. Constipation- Several bm's after suppository, continue bowel  regimen  4. Leukocytosis- trending down    Daughter wants Palliative Care. Nothing to add so will sign off and see prn.     Reynold Mcneil M.D.

## 2017-03-15 NOTE — NURSING NOTE
Placed call to AFUA Lopez for gi and notified her pt did not want to have MRCP considering not wanting anything invasive.

## 2017-03-15 NOTE — SIGNIFICANT NOTE
"   03/15/17 1512   Rehab Treatment   Discipline physical therapist   Treatment Not Performed patient/family declined treatment  (pt states \"Lord, no!\" when asked today, states she is not able to do it today. spoke with PRISCILLA Clifford, patient going to palliative care but does not have full palliative orders yet. will check status tomorrow )   Recommendation   PT - Next Appointment 03/16/17     "

## 2017-03-15 NOTE — PLAN OF CARE
Problem: Patient Care Overview (Adult)  Goal: Plan of Care Review  Outcome: Ongoing (interventions implemented as appropriate)    03/15/17 1807   Coping/Psychosocial Response Interventions   Plan Of Care Reviewed With patient   Patient Care Overview   Progress no change   Outcome Evaluation   Outcome Summary/Follow up Plan pt alert and oriented. denies pain. no acute distress noted. waiting for available room on part tower to transfer. will continue to monitor.       Goal: Adult Individualization and Mutuality  Outcome: Ongoing (interventions implemented as appropriate)  Goal: Discharge Needs Assessment  Outcome: Ongoing (interventions implemented as appropriate)    Problem: Renal Failure/Kidney Injury, Acute (Adult)  Goal: Signs and Symptoms of Listed Potential Problems Will be Absent or Manageable (Renal Failure/Kidney Injury, Acute)  Outcome: Ongoing (interventions implemented as appropriate)    Problem: Pain, Acute (Adult)  Goal: Acceptable Pain Control/Comfort Level  Outcome: Ongoing (interventions implemented as appropriate)    Problem: Fall Risk (Adult)  Goal: Absence of Falls  Outcome: Ongoing (interventions implemented as appropriate)    Problem: Skin Integrity Impairment, Risk/Actual (Adult)  Goal: Skin Integrity/Wound Healing  Outcome: Ongoing (interventions implemented as appropriate)

## 2017-03-15 NOTE — PROGRESS NOTES
"[facility]     LOS: 12 days     Name: Clementina Betancur  Age: 89 y.o.  Sex: female  :  1927  MRN: [id number]         Primary Care Physician: Catrina Santiago MD    Subjective   Feels much worse today with shortness of breath.  Had a very bad night and was unable to catch her breath.  Denies chest pain.  Abdominal pain is also worse today  Temp:  [98.2 °F (36.8 °C)] 98.2 °F (36.8 °C)  Heart Rate:  [62-72] 62  Resp:  [18] 18  BP: (115-117)/(55-90) 115/60  Body mass index is 26.95 kg/(m^2).  Diet Regular; Low Fat    Objective:  General Appearance:  Ill-appearing, in pain, in distress and uncomfortable.    Vital signs: (most recent): Blood pressure 115/60, pulse 62, temperature 98.2 °F (36.8 °C), temperature source Oral, resp. rate 18, height 60\" (152.4 cm), weight 138 lb (62.6 kg), SpO2 99 %.    Lungs:  Normal respiratory rate and increased effort.  She is in respiratory distress.  There are decreased breath sounds (bases).  No wheezes.    Heart: Normal rate.  Regular rhythm.    Abdomen: Abdomen is soft.  (Has an abdominal binder)There is generalized tenderness.  (Mild pain with palpation).     Extremities: There is dependent edema (2+).    Neurological: Patient is alert.    Skin:  Warm and dry.          Results Review:    Reviewed medications and new clinical results    amiodarone 200 mg Oral BID   bisacodyl 10 mg Rectal Once   bumetanide 1 mg Oral Daily   diltiaZEM  mg Oral Q24H   docusate sodium 100 mg Oral BID   enoxaparin 30 mg Subcutaneous Daily   fluticasone 2 spray Nasal Daily   hydrALAZINE 75 mg Oral TID   levothyroxine 75 mcg Oral Q AM   saccharomyces boulardii 250 mg Oral BID          Results from last 7 days  Lab Units 17  0854 17  0740 17  0620 03/10/17  0859 17  0737   WBC 10*3/mm3 11.19* 12.39* 13.73* 15.87* 13.32*   HEMOGLOBIN g/dL 10.1* 10.2* 9.7* 9.7* 9.1*   PLATELETS 10*3/mm3 516* 361 385 369 346 "       Results from last 7 days  Lab Units 03/15/17  0728 03/14/17  0854 03/13/17  0740 03/12/17  0838 03/11/17  0620 03/10/17  0859 03/09/17  0737   SODIUM mmol/L 139 140 138 140 137 138 138   POTASSIUM mmol/L 3.8 3.6 3.8 3.9 3.4* 4.2 4.3   CHLORIDE mmol/L 103 103 103 102 99 100 101   TOTAL CO2 mmol/L 22.8 21.1* 17.4* 21.8* 22.4 18.2* 19.0*   BUN mg/dL 75* 79* 85* 93* 94* 89* 84*   CREATININE mg/dL 3.48* 3.32* 3.34* 3.64* 3.59* 3.68* 3.13*   CALCIUM mg/dL 8.1* 8.0* 8.0* 8.5* 8.2* 9.0 9.3   GLUCOSE mg/dL 93 103* 94 101* 125* 61* 64*       Results from last 7 days  Lab Units 03/10/17  0859   AMYLASE U/L 37       0  Lab Value Date/Time   LIPASE 63 (H) 03/10/2017 0859   LIPASE 51 03/09/2017 0737   LIPASE 95 (H) 03/08/2017 0714   LIPASE 534 (H) 03/07/2017 0600       Results from last 7 days  Lab Units 03/15/17  0728 03/14/17  0854 03/13/17  0740   ALK PHOS U/L 409* 422* 412*   BILIRUBIN mg/dL 0.4 0.4 0.4   BILIRUBIN DIRECT mg/dL  --   --  0.2   ALT (SGPT) U/L 169* 201* 206*   AST (SGOT) U/L 125* 176* 170*     Estimated Creatinine Clearance: 9 mL/min (by C-G formula based on Cr of 3.48).    Assessment/Plan   Active Hospital Problems (** Indicates Principal Problem)    Diagnosis Date Noted   • **Sepsis [A41.9] 03/04/2017   • Idiopathic acute pancreatitis [K85.00] 03/07/2017   • Pneumonia due to infectious organism [J18.9] 03/04/2017   • Acute on chronic renal failure [N17.9, N18.9] 03/03/2017   • UTI (urinary tract infection) [N39.0] 12/28/2016   • Left bundle branch block (LBBB) [I44.7] 12/27/2016   • COPD (chronic obstructive pulmonary disease) [J44.9] 12/27/2016   • Acute respiratory failure with hypoxia [J96.01] 12/27/2016   • Atrial fibrillation [I48.91] 12/26/2016      Resolved Hospital Problems    Diagnosis Date Noted Date Resolved   No resolved problems to display.     · Palliative care saw today and patient wishes to be transferred to for [place] and make an ENT except for antibiotics  · Acute dyspnea, chest x-ray  shows pulmonary edema so we'll give diuretics today, no IV so we'll give oral  · Patient does not want MRCP  · Continue current pain medications plus adding morphine solution  · Still FULL code  · H/o c. Diff, watch out for diarrhea, BM x 1 today  · Discussed with palliative care  Jagjit Becerril MD   03/15/17  2:19 PM

## 2017-03-15 NOTE — NURSING NOTE
Called report to alma in [facility] and gave report, shaunna gathering pt's personal items for transfer.

## 2017-03-15 NOTE — CONSULTS
"Purpose of the visit was to evaluate for: goals of care/advanced care planning. Spoke with MD and RN as well as patient and family and discussed palliative care, goals of care, care options and resuscitation status.      Assessment:  Patient is palliative care appropriate for inpatient care given (list diagnosis/symptoms):  History of Atrial fibrillation, left bundle branch, chronic renal failure, COPD, and CHF.  Patient admitted with sepsis, UTI, and pancreatitis. Patient c/o shortness of air, pain, nausea, and anxiety.      Recommendations/Plan: transfer to [facility] for palliative care.    Other Comments: Very lengthy discussion with patient and daughter at bedside.  Patient very indecisive about goals of care, stating \"what I want isn't possible\" and \"I guess I'm going to die either way\".  I provided psychosocial support and explained palliative care, potential discharge plans based on goals of care, and symptoms that Mrs. Betancur has been experiencing.  Mrs. Betancur agreed she didn't want to \"be stuck anymore\" or \"have to feel like I can't catch my breath all the time\".  We discussed code status, to which the patient agreed with AND with conditions of antibiotics.  The daughter (Promise) and patient agreed to transfer to [facility] for palliative care, symptom management, and discharge planning.  We briefly discussed potential options of going home with home health or Hospice, or to [facility] with goal of comfort and no rehospitalization.  MD contacted for orders, awaiting placement on [facility].   Thank you for the consult.  "

## 2017-03-15 NOTE — PROGRESS NOTES
"   LOS: 12 days   Patient Care Team:  Catrina Santiago MD as PCP - General (Internal Medicine)    Chief Complaint/ Reason for encounter: Acute renal failure, hypercalcemia  Chief Complaint   Patient presents with   • Weakness - Generalized   • Female Dysuria         Subjective     Weakness - Generalized   Associated symptoms include nausea, vomiting and weakness. Pertinent negatives include no chest pain or fever.   Female Dysuria    Associated symptoms include nausea and vomiting.       Subjective:  Symptoms:  Worsening.  She reports malaise and weakness.  No shortness of breath or chest pain.  (She feels somewhat better today  She's not tolerating any type of oral intake very well, still with significant abdominal pain  Edema somewhat improved, shortness of breath somewhat worse).    Diet:  Poor intake.  She reports  nausea and vomiting.    Activity level: Impaired due to weakness.    Pain:  She complains of pain that is mild.  She reports pain is unchanged.  Pain is requiring pain medication and poorly controlled.          History taken from: Patient and chart    Objective     Vital Signs  Temp:  [98.2 °F (36.8 °C)] 98.2 °F (36.8 °C)  Heart Rate:  [62-72] 62  Resp:  [18] 18  BP: (115-117)/(55-90) 115/60       Wt Readings from Last 1 Encounters:   03/15/17 0500 138 lb (62.6 kg)   03/14/17 0607 146 lb (66.2 kg)   03/13/17 0600 143 lb 15.4 oz (65.3 kg)   03/12/17 0600 137 lb 14.4 oz (62.6 kg)   03/11/17 0600 148 lb 4.8 oz (67.3 kg)   03/10/17 0044 138 lb 6.4 oz (62.8 kg)   03/09/17 0601 141 lb 12.8 oz (64.3 kg)   03/08/17 0300 145 lb (65.8 kg)   03/07/17 0500 146 lb (66.2 kg)   03/06/17 0600 120 lb (54.4 kg)   03/03/17 1756 118 lb (53.5 kg)       Objective:  General Appearance:  Comfortable, in no acute distress, not in pain and ill-appearing (Chronically ill-appearing).    Vital signs: (most recent): Blood pressure 115/60, pulse 62, temperature 98.2 °F (36.8 °C), temperature source Oral, resp. rate 18, height 60\" " (152.4 cm), weight 138 lb (62.6 kg), SpO2 99 %.  Vital signs are normal.  No fever.    Output: Producing urine.    HEENT: Normal HEENT exam.    Lungs:  Tachypnea.  She is not in respiratory distress.  Breath sounds clear to auscultation.  There are decreased breath sounds.  No wheezes.    Heart: Normal rate.  Regular rhythm.  No murmur.   Abdomen: Abdomen is soft and non-distended.  Bowel sounds are normal.   There is epigastric tenderness. There is no suprapubic area tenderness.  (Less abdominal tenderness today).     Extremities: Normal range of motion.  There is dependent edema.  There is no deformity.    Pulses: Distal pulses are intact.    Neurological: Patient is alert and oriented to person, place and time.    Skin:  Warm and dry.  No rash or cyanosis.             Results Review:    Past Medical History: reviewed and updated  Past Medical History   Diagnosis Date   • Acute respiratory failure with hypoxia    • Arthritis    • Asthma    • Atrial fibrillation    • Cancer      multiple skincancers   • Candidal stomatitis    • CHF (congestive heart failure)    • Chronic kidney disease, stage 4, severely decreased GFR    • COPD (chronic obstructive pulmonary disease)    • Enterocolitis due to Clostridium difficile 12/29/2016     positive specimen collected on 12/29/2016   • History of falling    • History of transfusion      1989   • Hypertension    • Hypothyroidism    • Muscle weakness (generalized)    • Need for assistance with personal care    • Renal disorder    • Weakness          Allergies:  Allergies   Allergen Reactions   • Augmentin [Amoxicillin-Pot Clavulanate]    • Ceftin [Cefuroxime Axetil]    • Ciprofloxacin    • Colchicine    • Garlic    • Hydrocodone    • Rabeprazole    • Tetracyclines & Related        Intake/Output:     Intake/Output Summary (Last 24 hours) at 03/15/17 1606  Last data filed at 03/15/17 1300   Gross per 24 hour   Intake    460 ml   Output   1100 ml   Net   -640 ml          DATA:  Interval chart, labs and notes reviewed.    Labs:   Recent Results (from the past 24 hour(s))   POC Glucose Fingerstick    Collection Time: 03/15/17  7:12 AM   Result Value Ref Range    Glucose 85 70 - 130 mg/dL   Comprehensive Metabolic Panel    Collection Time: 03/15/17  7:28 AM   Result Value Ref Range    Glucose 93 65 - 99 mg/dL    BUN 75 (H) 8 - 23 mg/dL    Creatinine 3.48 (H) 0.57 - 1.00 mg/dL    Sodium 139 136 - 145 mmol/L    Potassium 3.8 3.5 - 5.2 mmol/L    Chloride 103 98 - 107 mmol/L    CO2 22.8 22.0 - 29.0 mmol/L    Calcium 8.1 (L) 8.6 - 10.5 mg/dL    Total Protein 5.3 (L) 6.0 - 8.5 g/dL    Albumin 2.60 (L) 3.50 - 5.20 g/dL    ALT (SGPT) 169 (H) 1 - 33 U/L    AST (SGOT) 125 (H) 1 - 32 U/L    Alkaline Phosphatase 409 (H) 39 - 117 U/L    Total Bilirubin 0.4 0.1 - 1.2 mg/dL    eGFR Non African Amer 12 (L) >60 mL/min/1.73    Globulin 2.7 gm/dL    A/G Ratio 1.0 g/dL    BUN/Creatinine Ratio 21.6 7.0 - 25.0    Anion Gap 13.2 mmol/L   Phosphorus    Collection Time: 03/15/17  7:28 AM   Result Value Ref Range    Phosphorus 3.7 2.5 - 4.5 mg/dL            Medications have been reviewed:  Current Facility-Administered Medications   Medication Dose Route Frequency Provider Last Rate Last Dose   • acetaminophen (TYLENOL) tablet 650 mg  650 mg Oral Q4H PRN Oleksandr Keyes MD       • amiodarone (PACERONE) tablet 200 mg  200 mg Oral BID Oleksandr Keyes MD   200 mg at 03/15/17 0827   • bisacodyl (DULCOLAX) suppository 10 mg  10 mg Rectal Once AFUA Kurtz   10 mg at 03/13/17 1334   • bumetanide (BUMEX) tablet 1 mg  1 mg Oral Daily Jagjit Becerril MD       • diltiaZEM CD (CARDIZEM CD) 24 hr capsule 120 mg  120 mg Oral Q24H Oleksandr Keyes MD   120 mg at 03/15/17 0827   • docusate sodium (COLACE) capsule 100 mg  100 mg Oral BID AFUA Kurtz   100 mg at 03/15/17 0827   • enoxaparin (LOVENOX) syringe 30 mg  30 mg Subcutaneous Daily Oleksandr Keyes MD   30 mg at 03/15/17 0827    • fluticasone (FLONASE) 50 MCG/ACT nasal spray 2 spray  2 spray Nasal Daily Oleksandr Keyes MD   2 spray at 03/15/17 0827   • Glycerin-Hypromellose- (ARTIFICIAL TEARS) 0.2-0.2-1 % ophthalmic solution solution 1 drop  1 drop Both Eyes Q1H PRN Oleksandr Keyes MD       • hydrALAZINE (APRESOLINE) tablet 75 mg  75 mg Oral TID Oleksandr Keyes MD   75 mg at 03/15/17 0827   • ipratropium-albuterol (DUO-NEB) nebulizer solution 3 mL  3 mL Nebulization Q4H PRN Oleksandr Keyes MD       • levothyroxine (SYNTHROID, LEVOTHROID) tablet 75 mcg  75 mcg Oral Q AM Oleksandr Keyes MD   75 mcg at 03/15/17 0602   • Morphine 20 MG/ML concentrated solution 5 mg  5 mg Oral Q2H PRN Jagjit Becerril MD       • naloxone (NARCAN) injection 0.4 mg  0.4 mg Intravenous Q5 Min PRN Oleksandr Keyes MD       • nitroglycerin (NITROSTAT) SL tablet 0.4 mg  0.4 mg Sublingual Q5 Min PRN Oleksandr Keyes MD       • ondansetron (ZOFRAN) tablet 4 mg  4 mg Oral Q6H PRN Oleksandr Keyes MD        Or   • ondansetron ODT (ZOFRAN-ODT) disintegrating tablet 4 mg  4 mg Oral Q6H PRN Oleksandr Keyes MD   4 mg at 03/15/17 0026    Or   • ondansetron (ZOFRAN) injection 4 mg  4 mg Intravenous Q6H PRN Oleksandr Keyes MD   4 mg at 03/11/17 2102   • oxyCODONE-acetaminophen (PERCOCET) 5-325 MG per tablet 1 tablet  1 tablet Oral Q4H PRN Otilio Arnold MD   1 tablet at 03/14/17 2348   • saccharomyces boulardii (FLORASTOR) capsule 250 mg  250 mg Oral BID Miguel Demarco MD   250 mg at 03/15/17 0827   • sennosides-docusate sodium (SENOKOT-S) 8.6-50 MG tablet 2 tablet  2 tablet Oral Nightly PRN Oleksandr Keyes MD       • sodium chloride 0.9 % flush 1-10 mL  1-10 mL Intravenous PRN Oleksandr Keyes MD       • sodium chloride 0.9 % flush 10 mL  10 mL Intravenous PRN Sanju Sharma MD           Assessment/Plan     Principal Problem:    Sepsis  Active Problems:    Atrial fibrillation    Left bundle branch block (LBBB)    Acute  respiratory failure with hypoxia    COPD (chronic obstructive pulmonary disease)    UTI (urinary tract infection)    Acute on chronic renal failure    Pneumonia due to infectious organism    Idiopathic acute pancreatitis    DNAR (do not attempt resuscitation)      Assessment:  (Acute renal failure, a bit worse today  Hypercalcemia, better, status post Zometa  Chronic kidney disease stage IV , prior baseline creatinine around 2.5 but this might be new baseline creatinine for her  acute pancreatitis, possibly related to hypercalcemia,  , still some abdominal pain, did not tolerate advancement of her diet very well  COPD  Acute hypoxemic respiratory failure, but worse today  Abdominal pain, seems to be improved    Plan:  Events noted  Renal function stable  Patient still with quite a bit of pain and discomfort  Patient seems to be agreeable now to change to palliative care  Waiting on bed on [address] for possible transfer later today  Given her significant pain and multiple comorbidities, I agree with this course of action.).             Continue to monitor renal function, electroytes and volume closely   Please call me with any questions or concerns      Filipe Feldman MD   [facility]   [phone]    03/15/17  4:09 PM        EMR Dragon/Transcription disclaimer:    Much of this encounter note is an electronic transcription/translation of spoken language to printed text. The electronic translation of spoken language may permit erroneous, or at times, nonsensical words or phrases to be inadvertently transcribed; Although I have reviewed the note for such errors, some may still exist.

## 2017-03-16 NOTE — PROGRESS NOTES
"   LOS: 13 days   Patient Care Team:  Catrina Santiago MD as PCP - General (Internal Medicine)    Chief Complaint/ Reason for encounter: Acute renal failure, hypercalcemia  Chief Complaint   Patient presents with   • Weakness - Generalized   • Female Dysuria         Subjective     Weakness - Generalized   Associated symptoms include vomiting and weakness. Pertinent negatives include no chest pain, fever or nausea.   Female Dysuria    Associated symptoms include vomiting. Pertinent negatives include no nausea.       Subjective:  Symptoms:  Worsening.  She reports weakness.  No shortness of breath, malaise or chest pain.  (Much more comfortable today  No new complaints  She was transferred to palliative care floor last night).    Diet:  Poor intake.  She reports  vomiting.  No nausea.    Activity level: Impaired due to weakness.    Pain:  She complains of pain that is mild.  She reports pain is improving.  Pain is well controlled and requiring pain medication.          History taken from: Patient and chart    Objective     Vital Signs  Temp:  [98.3 °F (36.8 °C)-99.1 °F (37.3 °C)] 98.3 °F (36.8 °C)  Heart Rate:  [64-68] 68  Resp:  [16-20] 16  BP: (112-136)/(54-66) 112/54       Wt Readings from Last 1 Encounters:   03/15/17 0500 138 lb (62.6 kg)   03/14/17 0607 146 lb (66.2 kg)   03/13/17 0600 143 lb 15.4 oz (65.3 kg)   03/12/17 0600 137 lb 14.4 oz (62.6 kg)   03/11/17 0600 148 lb 4.8 oz (67.3 kg)   03/10/17 0044 138 lb 6.4 oz (62.8 kg)   03/09/17 0601 141 lb 12.8 oz (64.3 kg)   03/08/17 0300 145 lb (65.8 kg)   03/07/17 0500 146 lb (66.2 kg)   03/06/17 0600 120 lb (54.4 kg)   03/03/17 1756 118 lb (53.5 kg)       Objective:  General Appearance:  Comfortable, in no acute distress, not in pain and ill-appearing (Chronically ill-appearing).    Vital signs: (most recent): Blood pressure 112/54, pulse 68, temperature 98.3 °F (36.8 °C), temperature source Oral, resp. rate 16, height 60\" (152.4 cm), weight 138 lb (62.6 kg), SpO2 " 97 %.  Vital signs are normal.  No fever.    Output: Producing urine.    HEENT: Normal HEENT exam.    Lungs:  Tachypnea.  She is not in respiratory distress.  Breath sounds clear to auscultation.  There are decreased breath sounds.  No wheezes.    Heart: Normal rate.  Regular rhythm.  No murmur.   Abdomen: Abdomen is soft and non-distended.  Bowel sounds are normal.   There is no epigastric area or no suprapubic area tenderness.  (Less abdominal tenderness today).     Extremities: Normal range of motion.  There is no deformity or dependent edema.    Pulses: Distal pulses are intact.    Neurological: Patient is alert and oriented to person, place and time.    Skin:  Warm and dry.  No rash or cyanosis.             Results Review:    Past Medical History: reviewed and updated  Past Medical History   Diagnosis Date   • Acute respiratory failure with hypoxia    • Arthritis    • Asthma    • Atrial fibrillation    • Cancer      multiple skincancers   • Candidal stomatitis    • CHF (congestive heart failure)    • Chronic kidney disease, stage 4, severely decreased GFR    • COPD (chronic obstructive pulmonary disease)    • Enterocolitis due to Clostridium difficile 12/29/2016     positive specimen collected on 12/29/2016   • History of falling    • History of transfusion      1989   • Hypertension    • Hypothyroidism    • Muscle weakness (generalized)    • Need for assistance with personal care    • Renal disorder    • Weakness          Allergies:  Allergies   Allergen Reactions   • Augmentin [Amoxicillin-Pot Clavulanate]    • Ceftin [Cefuroxime Axetil]    • Ciprofloxacin    • Colchicine    • Garlic    • Hydrocodone    • Rabeprazole    • Tetracyclines & Related        Intake/Output:     Intake/Output Summary (Last 24 hours) at 03/16/17 1522  Last data filed at 03/16/17 0324   Gross per 24 hour   Intake    220 ml   Output   1075 ml   Net   -855 ml         DATA:  Interval chart, labs and notes reviewed.    Labs:   No results  found for this or any previous visit (from the past 24 hour(s)).         Medications have been reviewed:  Current Facility-Administered Medications   Medication Dose Route Frequency Provider Last Rate Last Dose   • acetaminophen (TYLENOL) tablet 650 mg  650 mg Oral Q4H PRN Jagjit Becerril MD        Or   • acetaminophen (TYLENOL) 160 MG/5ML solution 650 mg  650 mg Oral Q4H PRN Jagjit Becerril MD        Or   • acetaminophen (TYLENOL) suppository 650 mg  650 mg Rectal Q4H PRN Jagjit Becerril MD       • acetaminophen (TYLENOL) tablet 650 mg  650 mg Oral Q4H PRN Oleksandr Keyes MD       • bisacodyl (DULCOLAX) suppository 10 mg  10 mg Rectal Once AFUA Kurtz   10 mg at 03/13/17 1334   • bumetanide (BUMEX) tablet 1 mg  1 mg Oral Daily Jagjit Becerril MD   1 mg at 03/16/17 0942   • diltiaZEM CD (CARDIZEM CD) 24 hr capsule 120 mg  120 mg Oral Q24H Oleksandr Keyes MD   120 mg at 03/16/17 0942   • diphenoxylate-atropine (LOMOTIL) 2.5-0.025 MG per tablet 1 tablet  1 tablet Oral Q2H PRN Jagjit Becerril MD       • docusate sodium (COLACE) capsule 100 mg  100 mg Oral BID AFUA Kurtz   100 mg at 03/15/17 1759   • fluticasone (FLONASE) 50 MCG/ACT nasal spray 2 spray  2 spray Nasal Daily Oleksandr Keyes MD   2 spray at 03/15/17 0827   • Glycerin-Hypromellose- (ARTIFICIAL TEARS) 0.2-0.2-1 % ophthalmic solution solution 1 drop  1 drop Both Eyes Q1H PRN Oleksandr Keyes MD       • Glycerin-Hypromellose- (ARTIFICIAL TEARS) 0.2-0.2-1 % ophthalmic solution solution 1 drop  1 drop Both Eyes Q30 Min PRN Jagjit Becerril MD       • hydrALAZINE (APRESOLINE) tablet 75 mg  75 mg Oral TID Oleksandr Keyes MD   75 mg at 03/16/17 0942   • ipratropium-albuterol (DUO-NEB) nebulizer solution 3 mL  3 mL Nebulization Q4H PRN Oleksandr Keyes MD       • lidocaine viscous (XYLOCAINE) 2 % mouth solution 5 mL  5 mL Mouth/Throat Q4H PRN Jagjit Becerril MD       •  LORazepam (ATIVAN) tablet 0.5 mg  0.5 mg Oral Q1H PRN Jagjit Becerril MD   0.5 mg at 03/16/17 1456    Or   • LORazepam (ATIVAN) injection 0.5 mg  0.5 mg Intravenous Q1H PRN Jagjit Becerril MD        Or   • LORazepam (ATIVAN) injection 0.5 mg  0.5 mg Intramuscular Q1H PRN Jagjit Becerril MD        Or   • LORazepam (ATIVAN) 2 MG/ML concentrated solution 0.5 mg  0.5 mg Oral Q1H PRN Jagjit Becerril MD        Or   • LORazepam (ATIVAN) 2 MG/ML concentrated solution 0.5 mg  0.5 mg Sublingual Q1H PRN Jagjit Becerril MD       • LORazepam (ATIVAN) tablet 1 mg  1 mg Oral Q1H PRN Jagjit Becerril MD        Or   • LORazepam (ATIVAN) injection 1 mg  1 mg Intravenous Q1H PRN Jagjit Becerril MD        Or   • LORazepam (ATIVAN) injection 1 mg  1 mg Intramuscular Q1H PRN Jagjit Becerril MD        Or   • LORazepam (ATIVAN) 2 MG/ML concentrated solution 1 mg  1 mg Oral Q1H PRN Jagjit Becerril MD        Or   • LORazepam (ATIVAN) 2 MG/ML concentrated solution 1 mg  1 mg Sublingual Q1H PRN Jagjit Becerril MD       • LORazepam (ATIVAN) tablet 2 mg  2 mg Oral Q1H PRN Jagjit Becerril MD        Or   • LORazepam (ATIVAN) injection 2 mg  2 mg Intravenous Q1H PRN Jagjit Becerril MD        Or   • LORazepam (ATIVAN) injection 2 mg  2 mg Intramuscular Q1H PRN Jagjit Becerril MD        Or   • LORazepam (ATIVAN) 2 MG/ML concentrated solution 2 mg  2 mg Oral Q1H PRN Jagjit Becerril MD        Or   • LORazepam (ATIVAN) 2 MG/ML concentrated solution 2 mg  2 mg Sublingual Q1H PRN Jagjit Becerril MD       • morphine injection 4 mg  4 mg Intravenous Q1H PRN Jagjit Becerril MD        Or   • Morphine 20 MG/ML concentrated solution 10 mg  10 mg Oral Q1H PRN Jagjit Becerril MD   10 mg at 03/16/17 1128   • Morphine injection 6 mg  6 mg Intravenous Q1H PRN Jagjit Becerril MD        Or   • Morphine 20 MG/ML concentrated solution 20 mg  20 mg Oral Q1H PRN  Jagjit Becerril MD       • Morphine 20 MG/ML concentrated solution 5 mg  5 mg Oral Q2H PRN Jagjit Becerril MD   10 mg at 03/16/17 0943   • nitroglycerin (NITROSTAT) SL tablet 0.4 mg  0.4 mg Sublingual Q5 Min PRN Oleksandr Keyes MD       • ondansetron (ZOFRAN) tablet 4 mg  4 mg Oral Q6H PRN Oleksandr Keyes MD        Or   • ondansetron ODT (ZOFRAN-ODT) disintegrating tablet 4 mg  4 mg Oral Q6H PRN Oleksandr Keyes MD   4 mg at 03/15/17 0026    Or   • ondansetron (ZOFRAN) injection 4 mg  4 mg Intravenous Q6H PRN Oleksandr Keyes MD   4 mg at 03/11/17 2102   • oxyCODONE-acetaminophen (PERCOCET) 5-325 MG per tablet 1 tablet  1 tablet Oral Q4H PRN Otilio Arnold MD   1 tablet at 03/14/17 2348   • saccharomyces boulardii (FLORASTOR) capsule 250 mg  250 mg Oral BID Miguel Demarco MD   250 mg at 03/16/17 0942   • sennosides-docusate sodium (SENOKOT-S) 8.6-50 MG tablet 2 tablet  2 tablet Oral Nightly PRN Oleksandr Keyes MD       • sodium chloride 0.9 % flush 1-10 mL  1-10 mL Intravenous PRN Oleksandr Keyes MD       • sodium chloride 0.9 % flush 10 mL  10 mL Intravenous PRN Sanju Sharma MD           Assessment/Plan     Principal Problem:    Sepsis  Active Problems:    Atrial fibrillation    Left bundle branch block (LBBB)    Acute respiratory failure with hypoxia    COPD (chronic obstructive pulmonary disease)    UTI (urinary tract infection)    Acute on chronic renal failure    Pneumonia due to infectious organism    Idiopathic acute pancreatitis    DNAR (do not attempt resuscitation)      Assessment:  (Acute renal failure, was worse yesterday, no recent labs  Hypercalcemia, better, status post Zometa  Chronic kidney disease stage IV ,    acute pancreatitis, possibly related to hypercalcemia, abdominal pain improved  COPD  Acute hypoxemic respiratory failure,    Abdominal pain, seems to be improved    Plan:  Patient seems much more comfortable today  Decision was made with patient and  family yesterday to change to palliative care  Should be okay to continue Bumex and current blood pressure regimen as long as she is able to take oral medications  Obviously no further labs given change in goals of care  I'll see when necessary, call if any questions).             Continue to monitor renal function, electroytes and volume closely   Please call me with any questions or concerns      Filipe Feldman MD   [facility]   [phone]    03/16/17  3:22 PM        EMR Dragon/Transcription disclaimer:    Much of this encounter note is an electronic transcription/translation of spoken language to printed text. The electronic translation of spoken language may permit erroneous, or at times, nonsensical words or phrases to be inadvertently transcribed; Although I have reviewed the note for such errors, some may still exist.

## 2017-03-16 NOTE — PROGRESS NOTES
Discharge Planning Assessment  [facility]     Patient Name: Clementina Betancur  MRN: [id number]  Today's Date: 3/16/2017    Admit Date: 3/3/2017          Discharge Needs Assessment     None            Discharge Plan       03/16/17 1709    Case Management/Social Work Plan    Additional Comments The patient transferred to [facility] from [facility] on 3/15/17 @ 19:46. The patient is palliative. [facility] to call family and set up an appointment to meet with them. JANE Leonard RN, CCP        Discharge Placement     Facility/Agency Request Status Selected? Address Phone Number Fax Number    [facility] Pending - Request Sent     [address] [facility] [address] [phone]                Demographic Summary     None            Functional Status     None            Psychosocial     None            Abuse/Neglect     None            Legal     None            Substance Abuse     None            Patient Forms     None          Sepideh Leonard RN

## 2017-03-16 NOTE — PLAN OF CARE
Problem: Patient Care Overview (Adult)  Goal: Plan of Care Review  Outcome: Ongoing (interventions implemented as appropriate)    03/16/17 0629   Coping/Psychosocial Response Interventions   Plan Of Care Reviewed With patient   Patient Care Overview   Progress no change   Outcome Evaluation   Outcome Summary/Follow up Plan Morphine 5mg sl x2. Frequent oral care and lip care. Pt reports her lips dry and cracking causing pain. Also c/o leg and shoulder pain at times. Will continue comfort care         Problem: Pain, Acute (Adult)  Goal: Acceptable Pain Control/Comfort Level  Outcome: Ongoing (interventions implemented as appropriate)    Problem: Fall Risk (Adult)  Goal: Absence of Falls  Outcome: Outcome(s) achieved Date Met:  03/16/17    Problem: Skin Integrity Impairment, Risk/Actual (Adult)  Goal: Skin Integrity/Wound Healing  Outcome: Ongoing (interventions implemented as appropriate)    Problem: Dying Patient, Actively (Adult)  Goal: Identify Related Risk Factors and Signs and Symptoms  Outcome: Outcome(s) achieved Date Met:  03/16/17 03/16/17 0629   Dying Patient, Actively   Actively Dying Patient: Related Risk Factors age/developmental level;disease progression;emotional state;fear of dying;level of consciousness   Signs and Symptoms (Actively Dying Patient) agitation/restlessness;awareness of life transition;changes in bladder elimination pattern;frequently disoriented;skin color changes;vital sign changes;profound weakness;pain       Goal: Comfort/Pain Control  Outcome: Ongoing (interventions implemented as appropriate)  Goal: Dying Process, Peace and Dignity  Outcome: Ongoing (interventions implemented as appropriate)

## 2017-03-16 NOTE — PLAN OF CARE
Problem: Patient Care Overview (Adult)  Goal: Plan of Care Review  Outcome: Ongoing (interventions implemented as appropriate)    03/16/17 0688   Coping/Psychosocial Response Interventions   Plan Of Care Reviewed With patient;daughter   Patient Care Overview   Progress no change   Outcome Evaluation   Outcome Summary/Follow up Plan gave morphine 2x during shift and ativan 1x. she stated ativan helped with anxiety, continue to monitor for comfort per palliative protocol        Goal: Adult Individualization and Mutuality  Outcome: Ongoing (interventions implemented as appropriate)  Goal: Discharge Needs Assessment  Outcome: Ongoing (interventions implemented as appropriate)    Problem: Pain, Acute (Adult)  Goal: Acceptable Pain Control/Comfort Level  Outcome: Ongoing (interventions implemented as appropriate)    Problem: Skin Integrity Impairment, Risk/Actual (Adult)  Goal: Skin Integrity/Wound Healing  Outcome: Ongoing (interventions implemented as appropriate)    Problem: Dying Patient, Actively (Adult)  Goal: Comfort/Pain Control  Outcome: Ongoing (interventions implemented as appropriate)  Goal: Dying Process, Peace and Dignity  Outcome: Ongoing (interventions implemented as appropriate)

## 2017-03-16 NOTE — PROGRESS NOTES
"[facility]     LOS: 13 days     Name: Clementina Betancur  Age: 89 y.o.  Sex: female  :  1927  MRN: [id number]         Primary Care Physician: Catrina Santiago MD    Subjective   Feels better in regards to shortness of breath.  Transferred to  yesterday, pain better, ate breakfast  Temp:  [98.3 °F (36.8 °C)-99.1 °F (37.3 °C)] 98.3 °F (36.8 °C)  Heart Rate:  [64-68] 68  Resp:  [16-20] 16  BP: (112-136)/(54-66) 112/54  Body mass index is 26.95 kg/(m^2).  Diet Regular; Low Fat    Objective:  General Appearance:  Ill-appearing, not in pain, in no acute distress and comfortable.    Vital signs: (most recent): Blood pressure 112/54, pulse 68, temperature 98.3 °F (36.8 °C), temperature source Oral, resp. rate 16, height 60\" (152.4 cm), weight 138 lb (62.6 kg), SpO2 97 %.    Lungs:  Normal respiratory rate and increased effort.  She is in respiratory distress.  There are decreased breath sounds (bases).  No wheezes.    Heart: Normal rate.  Regular rhythm.    Abdomen: Abdomen is soft.  (Has an abdominal binder)There is generalized tenderness.  (Mild pain with palpation).     Extremities: There is dependent edema (2+).    Neurological: Patient is alert.    Skin:  Warm and dry.          Results Review:    Reviewed medications and new clinical results    amiodarone 200 mg Oral BID   bisacodyl 10 mg Rectal Once   bumetanide 1 mg Oral Daily   diltiaZEM  mg Oral Q24H   docusate sodium 100 mg Oral BID   fluticasone 2 spray Nasal Daily   hydrALAZINE 75 mg Oral TID   levothyroxine 75 mcg Oral Q AM   saccharomyces boulardii 250 mg Oral BID          Results from last 7 days  Lab Units 17  0854 17  0740 17  0620 03/10/17  0859   WBC 10*3/mm3 11.19* 12.39* 13.73* 15.87*   HEMOGLOBIN g/dL 10.1* 10.2* 9.7* 9.7*   PLATELETS 10*3/mm3 516* 361 385 369       Results from last 7 days  Lab Units 03/15/17  0728 17  0854 17  0740 17  0838 " 03/11/17  0620 03/10/17  0859   SODIUM mmol/L 139 140 138 140 137 138   POTASSIUM mmol/L 3.8 3.6 3.8 3.9 3.4* 4.2   CHLORIDE mmol/L 103 103 103 102 99 100   TOTAL CO2 mmol/L 22.8 21.1* 17.4* 21.8* 22.4 18.2*   BUN mg/dL 75* 79* 85* 93* 94* 89*   CREATININE mg/dL 3.48* 3.32* 3.34* 3.64* 3.59* 3.68*   CALCIUM mg/dL 8.1* 8.0* 8.0* 8.5* 8.2* 9.0   GLUCOSE mg/dL 93 103* 94 101* 125* 61*       Results from last 7 days  Lab Units 03/10/17  0859   AMYLASE U/L 37       0  Lab Value Date/Time   LIPASE 63 (H) 03/10/2017 0859   LIPASE 51 03/09/2017 0737   LIPASE 95 (H) 03/08/2017 0714   LIPASE 534 (H) 03/07/2017 0600       Results from last 7 days  Lab Units 03/15/17  0728 03/14/17  0854 03/13/17  0740   ALK PHOS U/L 409* 422* 412*   BILIRUBIN mg/dL 0.4 0.4 0.4   BILIRUBIN DIRECT mg/dL  --   --  0.2   ALT (SGPT) U/L 169* 201* 206*   AST (SGOT) U/L 125* 176* 170*     Estimated Creatinine Clearance: 9 mL/min (by C-G formula based on Cr of 3.48).    Assessment/Plan   Active Hospital Problems (** Indicates Principal Problem)    Diagnosis Date Noted   • **Sepsis [A41.9] 03/04/2017   • DNAR (do not attempt resuscitation) [Z66] 03/15/2017   • Idiopathic acute pancreatitis [K85.00] 03/07/2017   • Pneumonia due to infectious organism [J18.9] 03/04/2017   • Acute on chronic renal failure [N17.9, N18.9] 03/03/2017   • UTI (urinary tract infection) [N39.0] 12/28/2016   • Left bundle branch block (LBBB) [I44.7] 12/27/2016   • COPD (chronic obstructive pulmonary disease) [J44.9] 12/27/2016   • Acute respiratory failure with hypoxia [J96.01] 12/27/2016   • Atrial fibrillation [I48.91] 12/26/2016      Resolved Hospital Problems    Diagnosis Date Noted Date Resolved   No resolved problems to display.     · Transferred to [facility]   · Will keep antihypertensive meds on but d/c amiodarone and synthroid  · Add pain meds and anxiety meds  · Keep diuretics for comfort  · Patient does not want MRCP  · H/o c. Diff, watch out for diarrhea, BM x 1  today      Jagjit Becerril MD   03/16/17  10:51 AM

## 2017-03-17 PROBLEM — Z51.5 PALLIATIVE CARE PATIENT: Status: ACTIVE | Noted: 2017-01-01

## 2017-03-17 PROBLEM — Z51.5 ADMISSION FOR HOSPICE CARE: Status: ACTIVE | Noted: 2017-01-01

## 2017-03-17 NOTE — PLAN OF CARE
Problem: Patient Care Overview (Adult)  Goal: Plan of Care Review  Outcome: Ongoing (interventions implemented as appropriate)    03/17/17 0524   Coping/Psychosocial Response Interventions   Plan Of Care Reviewed With patient   Patient Care Overview   Progress no change   Outcome Evaluation   Outcome Summary/Follow up Plan No c/o of anxiety. Pt appears calm and comfortable. Morphine x1 at pt request. Will continue comfort measures         Problem: Pain, Acute (Adult)  Goal: Acceptable Pain Control/Comfort Level  Outcome: Ongoing (interventions implemented as appropriate)    Problem: Skin Integrity Impairment, Risk/Actual (Adult)  Goal: Skin Integrity/Wound Healing  Outcome: Ongoing (interventions implemented as appropriate)    Problem: Dying Patient, Actively (Adult)  Goal: Comfort/Pain Control  Outcome: Ongoing (interventions implemented as appropriate)  Goal: Dying Process, Peace and Dignity  Outcome: Ongoing (interventions implemented as appropriate)

## 2017-03-17 NOTE — DISCHARGE SUMMARY
[facility]    Date of Discharge:  3/17/2017    PCP: Catrina Santiago MD    Discharge Diagnosis:   Active Hospital Problems (** Indicates Principal Problem)    Diagnosis Date Noted   • **Palliative care patient [Z51.5] 03/17/2017   • DNAR (do not attempt resuscitation) [Z66] 03/15/2017   • Idiopathic acute pancreatitis [K85.00] 03/07/2017   • Pneumonia due to infectious organism [J18.9] 03/04/2017   • Sepsis [A41.9] 03/04/2017   • Acute on chronic renal failure [N17.9, N18.9] 03/03/2017   • UTI (urinary tract infection) [N39.0] 12/28/2016   • Left bundle branch block (LBBB) [I44.7] 12/27/2016   • COPD (chronic obstructive pulmonary disease) [J44.9] 12/27/2016   • Acute respiratory failure with hypoxia [J96.01] 12/27/2016   • Atrial fibrillation [I48.91] 12/26/2016      Resolved Hospital Problems    Diagnosis Date Noted Date Resolved   No resolved problems to display.      Consults      Hospital Course  Please see history and physical for details. Patient is a 89 y.o. female with a history of CKD4, CHF, COPD presents with initial diagnosis of sepsis with UTI and pneumonia. She has had some decline in her overall status. She had recent C. difficile colitis in 1-2 months ago. She also had acute kidney injury and acute hypoxic respiratory failure. She also had abdominal pain and CT scan showed pancreatitis. GI felt the etiology was multifactorial related to her multiple underlying illnessess/sepsis. Given her multiple comorbidities and significant discomfort/pain and after discussion with the patient as well as her daughter and being evaluated by the palliative nurse the patient requested palliative care.    Condition on Discharge: Improved    Vital Signs  Temp:  [97.8 °F (36.6 °C)-99.3 °F (37.4 °C)] 99.3 °F (37.4 °C)  Heart Rate:  [62-66] 66  Resp:  [16-18] 16  BP: (108)/(49) 108/49    Objective   General Appearance: Ill-appearing, in no acute distress and  uncomfortable.   Lungs: Normal respiratory rate and normal effort. There are decreased breath sounds. No wheezes.   Heart: Normal rate. Regular rhythm.   Abdomen: Abdomen is soft. (Has an abdominal binder)  Extremities: There is dependent edema (trace).   Neurological: Patient is alert.   Skin: Warm and dry.      Estimated Creatinine Clearance: 9 mL/min (by C-G formula based on Cr of 3.48).    Discharge Medications    bumetanide 1 mg Oral Daily   diltiaZEM  mg Oral Q24H   docusate sodium 100 mg Oral BID   fluticasone 2 spray Nasal Daily   saccharomyces boulardii 250 mg Oral BID   PRN  •  acetaminophen **OR** acetaminophen **OR** acetaminophen  •  acetaminophen  •  diphenoxylate-atropine  •  Glycerin-Hypromellose-  •  Glycerin-Hypromellose-  •  ipratropium-albuterol  •  lidocaine viscous  •  LORazepam **OR** LORazepam **OR** LORazepam **OR** LORazepam **OR** LORazepam  •  LORazepam **OR** LORazepam **OR** LORazepam **OR** LORazepam **OR** LORazepam  •  LORazepam **OR** LORazepam **OR** LORazepam **OR** LORazepam **OR** LORazepam  •  Morphine **OR** Morphine  •  Morphine **OR** Morphine  •  Morphine  •  nitroglycerin  •  ondansetron **OR** ondansetron ODT **OR** ondansetron  •  oxyCODONE-acetaminophen  •  sennosides-docusate sodium  •  sodium chloride  •  Insert peripheral IV **AND** sodium chloride    Discharge Diet: Diet Regular; Low Fat    Activity at Discharge: As tolerated    Follow-up Appointments  · Dr. Soham Leblanc MD  03/17/17  2:53 PM    Discharge time spent greater than 30 minutes.

## 2017-03-17 NOTE — PROGRESS NOTES
"[facility]               ASSOCIATES     LOS: 14 days     Name: Clementina Betancur  Age: 89 y.o.  Sex: female  :  1927  MRN: [id number]         Primary Care Physician: Catrina Santiago MD    Subjective   Feels OK. Denies pain. States constipated.    Temp:  [97.8 °F (36.6 °C)-99.3 °F (37.4 °C)] 99.3 °F (37.4 °C)  Heart Rate:  [62-66] 66  Resp:  [16-18] 16  BP: (108)/(49) 108/49  Body mass index is 26.95 kg/(m^2).  Diet Regular; Low Fat    Objective:  General Appearance:  Ill-appearing, in no acute distress and uncomfortable.    Vital signs: (most recent): Blood pressure 108/49, pulse 66, temperature 99.3 °F (37.4 °C), temperature source Oral, resp. rate 16, height 60\" (152.4 cm), weight 138 lb (62.6 kg), SpO2 95 %.    Lungs:  Normal respiratory rate and normal effort.  There are decreased breath sounds.  No wheezes.    Heart: Normal rate.  Regular rhythm.    Abdomen: Abdomen is soft.  (Has an abdominal binder)  Extremities: There is dependent edema (trace).    Neurological: Patient is alert.    Skin:  Warm and dry.        Results Review:    Reviewed medications and new clinical results    bumetanide 1 mg Oral Daily   diltiaZEM  mg Oral Q24H   docusate sodium 100 mg Oral BID   fluticasone 2 spray Nasal Daily   saccharomyces boulardii 250 mg Oral BID     Assessment/Plan   Active Hospital Problems (** Indicates Principal Problem)    Diagnosis Date Noted   • **Sepsis [A41.9] 2017   • DNAR (do not attempt resuscitation) [Z66] 03/15/2017   • Idiopathic acute pancreatitis [K85.00] 2017   • Pneumonia due to infectious organism [J18.9] 2017   • Acute on chronic renal failure [N17.9, N18.9] 2017   • UTI (urinary tract infection) [N39.0] 2016   • Left bundle branch block (LBBB) [I44.7] 2016   • COPD (chronic obstructive pulmonary disease) [J44.9] 2016   • Acute respiratory failure with hypoxia [J96.01] 2016   • Atrial fibrillation " [I48.91] 12/26/2016      Resolved Hospital Problems    Diagnosis Date Noted Date Resolved   No resolved problems to display.     · Continue palliative/comfort care  · [facility] to see    Artemio Leblanc MD   03/17/17  11:13 AM

## 2017-03-17 NOTE — PLAN OF CARE
Problem: Skin Integrity Impairment, Risk/Actual (Adult)  Goal: Skin Integrity/Wound Healing  Outcome: Ongoing (interventions implemented as appropriate)    Problem: Dying Patient, Actively (Adult)  Goal: Comfort/Pain Control  Outcome: Ongoing (interventions implemented as appropriate)  Goal: Dying Process, Peace and Dignity  Outcome: Ongoing (interventions implemented as appropriate)    Problem: Patient Care Overview (Adult)  Goal: Plan of Care Review  Outcome: Ongoing (interventions implemented as appropriate)    03/17/17 1901   Coping/Psychosocial Response Interventions   Plan Of Care Reviewed With patient;daughter   Patient Care Overview   Progress declining   Outcome Evaluation   Outcome Summary/Follow up Plan Pt admitted to [facility] today. Pt upset and anxious, concerned she will be discharged in the middle of the night. Reassurance given to patient. Medicated with Morphine and Ativan. Will continue to monitor.       Goal: Adult Individualization and Mutuality  Outcome: Ongoing (interventions implemented as appropriate)  Goal: Discharge Needs Assessment  Outcome: Ongoing (interventions implemented as appropriate)

## 2017-03-17 NOTE — PROGRESS NOTES
Discharge Planning Assessment  [facility]     Patient Name: Clementina Betancur  MRN: [id number]  Today's Date: 3/17/2017    Admit Date: 3/3/2017          Discharge Needs Assessment     None            Discharge Plan       03/17/17 1231    Case Management/Social Work Plan    Additional Comments [facility] is here to meet with the family for a [facility] scattered bed evaluation. CCP will follow. JANE Leonard RN        Discharge Placement     Facility/Agency Request Status Selected? Address Phone Number Fax Number    [facility] - Request Sent     [address] [address] [phone]                Demographic Summary     None            Functional Status     None            Psychosocial     None            Abuse/Neglect     None            Legal     None            Substance Abuse     None            Patient Forms     None          Sepideh Leonard RN

## 2017-03-17 NOTE — PROGRESS NOTES
[facility] Admission Note             Clementina Betancur  [id number]  3/17/2017    [facility]      Attending MD upon Admission: Dr. Rousseau        On [facility] waiting list:  yes, if pt needs discharge, daughter would like home with [facility].        Payor Source: Medicare medicare [facility] Dx: Pneumonia/respiratory failure          Next Steps: Pt is admitted [facility] scattered bed.   Team will begin visits tomorrow.             Recommendations: Pt states she is comfortable on current comfort meds.                 Review of Visit (Include All Collaboration -including names of hospital and family involved during admission): Met with pt and her daughter at bedside. Both confirmed understanding of terminal diagnosis and confirmed continued desire for comfort care only and DNR. Pt doesn't want to move anywhere at this time. Daughter is agreeable if pt needs discharge to take her home to daughter's home with [facility]. Pt wants to stay in the hospital here on the palliative care unit. Discussed with pt's staff nurse and with [employer]. Thanks for this referral. Weekend team will begin visits.             Anuradha Lunsford RN

## 2017-03-17 NOTE — PROGRESS NOTES
Discharge Planning Assessment  [facility]     Patient Name: Clementina Betancur  MRN: [id number]  Today's Date: 3/17/2017    Admit Date: 3/3/2017          Discharge Needs Assessment     None            Discharge Plan       03/17/17 1401    Final Note    Final Note Admitted to a [facility] scattered bed on 3/17/17. JANE mckeon RN, CCP      03/17/17 1231    Case Management/Social Work Plan    Additional Comments [facility]. CCP will follow. JANE Mckeon RN        Discharge Placement     Facility/Agency Request Status Selected? Address Phone Number Fax Number    [facility] Pending - Request Sent     [address] [address] [phone]                Demographic Summary     None            Functional Status     None            Psychosocial     None            Abuse/Neglect     None            Legal     None            Substance Abuse     None            Patient Forms     None          Sepideh Mckeon RN

## 2017-03-18 PROBLEM — I27.20 PULMONARY HYPERTENSION, MODERATE TO SEVERE (HCC): Status: ACTIVE | Noted: 2017-01-01

## 2017-03-18 PROBLEM — R79.89 ABNORMAL LFTS (LIVER FUNCTION TESTS): Status: ACTIVE | Noted: 2017-01-01

## 2017-03-18 PROBLEM — I50.32 DIASTOLIC DYSFUNCTION WITH CHRONIC HEART FAILURE (HCC): Status: ACTIVE | Noted: 2017-01-01

## 2017-03-18 PROBLEM — K86.1 IDIOPATHIC CHRONIC PANCREATITIS (HCC): Status: ACTIVE | Noted: 2017-01-01

## 2017-03-18 NOTE — PLAN OF CARE
Problem: Skin Integrity Impairment, Risk/Actual (Adult)  Goal: Skin Integrity/Wound Healing  Outcome: Ongoing (interventions implemented as appropriate)    Problem: Dying Patient, Actively (Adult)  Goal: Comfort/Pain Control  Outcome: Ongoing (interventions implemented as appropriate)  Goal: Dying Process, Peace and Dignity  Outcome: Ongoing (interventions implemented as appropriate)    Problem: Patient Care Overview (Adult)  Goal: Plan of Care Review  Outcome: Ongoing (interventions implemented as appropriate)    03/18/17 1600   Coping/Psychosocial Response Interventions   Plan Of Care Reviewed With patient   Patient Care Overview   Progress declining   Outcome Evaluation   Outcome Summary/Follow up Plan Pt anxious, ativan given x 1. Morphine given for leg pain. Pt requesting ice chips, toast with butter and jelly, and ensure only to eat. Will continue to monitor.       Goal: Adult Individualization and Mutuality  Outcome: Ongoing (interventions implemented as appropriate)  Goal: Discharge Needs Assessment  Outcome: Ongoing (interventions implemented as appropriate)

## 2017-03-18 NOTE — PROGRESS NOTES
[facility] Visit Report    Clementina Betancur  [id number]  3/18/2017    Admission R/T [facility] Dx: YES      Reason for [facility] Admission: Pneumonia, acute resp.failure      Symptom  Management: Resp.distress/anxiety      Nursing/Medication Recommendations: No new recommendations      Psychosocial Issues and Recommendations: Provide support to patient and family      Spiritual Concerns and Recommendations: None at present      [facility] Discharge Plans:  None at present, continue to monitor for decline and receiving IV Morphine for resp.distress and soa and oral Ativan for anxiety      Review of Visit: Patient lying in bed asleep/sedated, appears somewhat soa while asleep. Spoke to staff and they report patient is soa when talking and trying to eat and also gets very anxious. Patient has received IV Morphine 4mg x 2 doses and oral Ativan x1 dose since midnight. No family present during visit. Will continue to see daily to assess needs, monitor status and offer support.      María Elena Cadet RN  [facility]

## 2017-03-18 NOTE — PLAN OF CARE
Problem: Skin Integrity Impairment, Risk/Actual (Adult)  Goal: Skin Integrity/Wound Healing  Outcome: Ongoing (interventions implemented as appropriate)    Problem: Dying Patient, Actively (Adult)  Goal: Comfort/Pain Control  Outcome: Ongoing (interventions implemented as appropriate)  Goal: Dying Process, Peace and Dignity  Outcome: Ongoing (interventions implemented as appropriate)    Problem: Patient Care Overview (Adult)  Goal: Plan of Care Review  Outcome: Ongoing (interventions implemented as appropriate)    03/18/17 0257   Coping/Psychosocial Response Interventions   Plan Of Care Reviewed With patient   Patient Care Overview   Progress declining   Outcome Evaluation   Outcome Summary/Follow up Plan Patient has required reassurance throughout the evening; she is anxious and complains of chest pain. IV Morphine and IV Ativan given prior to turns for comfort. Will continue to assess for symptom management needs       Goal: Adult Individualization and Mutuality  Outcome: Ongoing (interventions implemented as appropriate)

## 2017-03-18 NOTE — H&P
Palliative Care/Hospice Admit/Consult Note      Referring Provider: Artemio Leblanc MD  Reason for Consultation: Hospice care  Date of Admission:  3/17/2017    Patient Care Team:  Catrina Santiago MD as PCP - General (Internal Medicine)    Chief complaint:  Bilateral pneumonia, atypical    History of present illness:  The patient is a 89 y.o. female with a history of CKD4, CHF, COPD presents with initial diagnosis of sepsis with UTI and pneumonia. She has had some decline in her overall status. She had recent C. difficile colitis in > 2 months ago. She had acute kidney injury and acute hypoxic respiratory failure identified this admission. She also had abdominal pain and CT scan showed pancreatitis. GI felt the etiology was multifactorial related to her multiple underlying illnessess/sepsis.  Amylase and Lipase has improved.  However, AST and ALT have elevated.  Given her multiple comorbidities and significant discomfort/pain and after discussion with the patient as well as her daughter and being evaluated by the palliative nurse the patient requested [facility] evaluation.  The patient was admitted as a hospice scatter bed.  After hospital discharge day readmission to [facility], I was asked to assume her care.    At the time of my evaluation, with the nurse accompany me, no family was present.  The patient was holding her cup with ice.  She had spilled some ice in the bed which was cleaned up.  When she awakened, she immediately asked for some water.  This was given.  No other review of systems couldn't be obtained from the patient because she kept drifting back to sleep.    Review of Systems  Pertinent items are noted in HPI    Palliative Performance Scale  Palliative Performance Scale Score: 20%    History  Past Medical History   Diagnosis Date   • Acute respiratory failure with hypoxia    • Arthritis    • Asthma    • Atrial fibrillation    • Cancer      multiple skincancers   • Candidal stomatitis    • CHF  (congestive heart failure)    • Chronic kidney disease, stage 4, severely decreased GFR    • COPD (chronic obstructive pulmonary disease)    • Enterocolitis due to Clostridium difficile 12/29/2016     positive specimen collected on 12/29/2016   • History of falling    • History of transfusion      1989   • Hypertension    • Hypothyroidism    • Muscle weakness (generalized)    • Need for assistance with personal care    • Renal disorder    • Weakness    ,   Past Surgical History   Procedure Laterality Date   • Hernia repair     • Colectomy partial / total Right 1989   • Hysterectomy     • Abdominal surgery     • Appendectomy     • Cholecystectomy Right    • Winter's neuroma excision Right    • Skin cancer excision     • Bronchoscopy N/A 1/12/2017     Procedure: BRONCHOSCOPY  WITH ANESTHESIA   with Bilateral  Lung  [place];  Surgeon: Lester Andino MD;  Location: [facility] ENDOSCOPY;  Service:    , No family history on file. and   Social History   Substance Use Topics   • Smoking status: Never Smoker   • Smokeless tobacco: Not on file   • Alcohol use Yes      Comment: social       Vital Signs   Temp:  [98 °F (36.7 °C)] 98 °F (36.7 °C)  Heart Rate:  [70] 70  Resp:  [16] 16  BP: (119)/(69) 119/69  O2 Device: nasal cannulaFlow (L/min):  [2] 2SpO2:  [97 %] 97 %    Physical Exam:     General Appearance:    refill he awakens and appears in no acute distress   Head:    Normocephalic, without obvious abnormality, atraumatic   Eyes:            Lids and lashes normal, conjunctivae and sclerae normal, no   icterus   Ears:    Ears appear intact with no abnormalities noted   Throat:   No oral lesions, oral mucosa moist   Neck:   No adenopathy, supple, trachea midline, thyroid seems prominent    Back:     No obvious scoliosis present   Lungs:     Clear to auscultation, diminished, scattered end inspiratory crackles, respirations regular, even and not labored    Heart:    Regular rhythm and normal rate   Breast Exam:    Deferred    Abdomen:     Abdominal binder, no masses, no organomegaly, soft        non-tender, non-distended   Genitalia:    Deferred   Extremities:   No edema, no cyanosis   Pulses:   Radial pulses palpable and equal bilaterally   Skin:   No bleeding       Neurologic:   Unable to test       Results Review:   I reviewed the patient's new clinical results.      Impression:  Principal Problem:    Pneumonia due to infectious organism  Active Problems:    Acute respiratory failure with hypoxia    Acute kidney injury    Admission for hospice care    Chronic kidney disease, stage IV (severe)    COPD (chronic obstructive pulmonary disease)    Idiopathic chronic pancreatitis    Abnormal LFTs (liver function tests)    Pulmonary hypertension, moderate to severe    Diastolic dysfunction with chronic heart failure    Hypertension        Plan:  No family present.  The patient had been medicated and she is not awake enough to get a history from.  I talked with the patient's nurse.  I agree with hospice scattered bed status.  Symptom management will be provided with the goal of care being comfort.  Continue AND/DNR.      Trung Rousseau MD  03/18/17  1:22 PM

## 2017-03-19 NOTE — PLAN OF CARE
Problem: Skin Integrity Impairment, Risk/Actual (Adult)  Goal: Skin Integrity/Wound Healing  Outcome: Ongoing (interventions implemented as appropriate)    Problem: Dying Patient, Actively (Adult)  Goal: Comfort/Pain Control  Outcome: Ongoing (interventions implemented as appropriate)  Goal: Dying Process, Peace and Dignity  Outcome: Ongoing (interventions implemented as appropriate)    Problem: Patient Care Overview (Adult)  Goal: Plan of Care Review  Outcome: Ongoing (interventions implemented as appropriate)    03/19/17 1804   Coping/Psychosocial Response Interventions   Plan Of Care Reviewed With patient   Patient Care Overview   Progress declining   Outcome Evaluation   Outcome Summary/Follow up Plan Pt c/o leg and back pain. Medicated with IV Morphine and po percocet. Less responsive today. Did not want to eat. Will continue to monitor.       Goal: Adult Individualization and Mutuality  Outcome: Ongoing (interventions implemented as appropriate)    03/18/17 0257 03/19/17 1804   Individualization   Patient Specific Preferences --  Pt would like to eat only toast with butter & jelly, ice chips, ensure, & coffee with cream & sugar for all meals. Do not have tray sent.    Patient Specific Goals comfort --    Mutuality/Individual Preferences   What Anxieties, Fears or Concerns Do You Have About Your Health or Care? --  Pt very anxious that she will be discharged in the middle of the night.

## 2017-03-19 NOTE — PLAN OF CARE
Problem: Skin Integrity Impairment, Risk/Actual (Adult)  Goal: Skin Integrity/Wound Healing  Outcome: Ongoing (interventions implemented as appropriate)    Problem: Dying Patient, Actively (Adult)  Goal: Comfort/Pain Control  Outcome: Ongoing (interventions implemented as appropriate)  Goal: Dying Process, Peace and Dignity  Outcome: Ongoing (interventions implemented as appropriate)    Problem: Patient Care Overview (Adult)  Goal: Plan of Care Review  Outcome: Ongoing (interventions implemented as appropriate)    03/19/17 0257   Coping/Psychosocial Response Interventions   Plan Of Care Reviewed With patient   Patient Care Overview   Progress progress towards functional goals is fair   Outcome Evaluation   Outcome Summary/Follow up Plan Patient not complaining of any pain during the night. Ativan PO was given at bedtime for anxiety. Rested comforable. Continue to monitor.

## 2017-03-19 NOTE — PROGRESS NOTES
[facility] Visit Report    Clementina Betancur  [id number]  3/19/2017    Admission R/T [facility] Dx: YES      Reason for [facility] Admission: Pneumonia, acute resp.failure      Symptom  Management: Resp.distress/anxiety      Nursing/Medication Recommendations: No new recommendations at this time      Psychosocial Issues and Recommendations: Provide support to patient and family      Spiritual Concerns and Recommendations: None at present      [facility] Discharge Plans:  None at present, continue to monitor for decline and patient receiving IV medication for comfort.      Review of Visit: Patient lying in bed, asleep. Patient color ashen, breathing shallow. Staff RN reports that patient more sleepy today and not eating except for applesauce with pills. Appears to be declining. Patient has received IV Morphine x 1 dose and IV Zofran x 1 dose, also oral Ativan and Percocet since midnight. Spoke to daughter Promise per phone regarding patient declining status and she is aware and accepting. Emotional support provided to her. Will see daily to assess needs, monitor status and offer support.      María Elena Cadet RN  [facility] Visit Nurse

## 2017-03-19 NOTE — PROGRESS NOTES
Palliative Care/Hospice Follow Up Note       LOS: 2 days   Patient Care Team:  Catrina Santiago MD as PCP - General (Internal Medicine)    Chief Complaint:  Pneumonia    Interval History:     Patient Complaints: Pain controlled and only a little nausea; main complaint is dryness and thirsty.  Patient Denies:  SOA  History taken from:  Patient and RN.    Review of Systems: As above.     Palliative Performance Scale  Palliative Performance Scale Score: 20%  [facility] Symptom Assessment System Revised  Pain Score: no pain  Tiredness Score: 7  Nausea Score: No nausea  Depression Score: 8  Anxiety Score: 3  Drowsiness Score: 6  Lack of Appetite Score: 6  Wellbeing Score: 7  Dyspnea Score: 2  Source of Information: patient  Intervention:  (refusing medications)  Intervention Response: tolerated    Vital Signs  Temp:  [98.8 °F (37.1 °C)-99.5 °F (37.5 °C)] 98.8 °F (37.1 °C)  Heart Rate:  [68-71] 71  Resp:  [16] 16  BP: (109-132)/(52-69) 132/69  O2 Device: room airFlow (L/min):  [2] 2SpO2:  [91 %-97 %] 91 %    Physical Exam:  General Appearance:    More awake today and appears in no acute distress   Throat:   No oral lesions, oral mucosa dry   Neck:   No adenopathy, supple, trachea midline   Lungs:     Clear to auscultation, occasional scattered inspiratory crackle, respirations regular, even and not labored    Heart:    Regular rhythm and normal rate   Abdomen:     Occasional bowel sounds, no masses, no organomegaly, soft and non-tender, non-distended   Extremities:   No edema, no cyanosis   Pulses:   Radial pulses palpable and equal bilaterally          Results Review:     I reviewed the patient's new clinical results.    Medication Reviewed.    Assessment/Plan     Principal Problem:    Pneumonia due to infectious organism  Active Problems:    Acute respiratory failure with hypoxia    Acute kidney injury    Admission for hospice care    Chronic kidney disease, stage IV (severe)    COPD (chronic obstructive pulmonary  disease)    Idiopathic chronic pancreatitis    Abnormal LFTs (liver function tests)    Pulmonary hypertension, moderate to severe    Diastolic dysfunction with chronic heart failure    Hypertension    I reviewed with the patient and her RN.  The patient mainly wants ice and or water.  Symptoms seem to be controlled.presently.  Continue with symptom management.  AND/DNR.  No family present.    Plan for disposition:[facility].    Trung Rousseau MD  03/19/17  1:15 PM

## 2017-03-20 NOTE — CONSULTS
Spiritual Assessment reveals that pt is Quaker and has requested a  for sacrament of the sick.  [facility]  and [facility]  collaborated to secure Fr. Quiros ([facility] [place]  contacted him), who said he will visit pt at 5p today.  Pt and pt's daughter (via [facility]  on phone) notified of 5p time of visit.  Pt was appreciative of pastoral care and is hopeful about sacrament of the sick.    Kj Demarco M.Div.  Chaplain Resident

## 2017-03-20 NOTE — PLAN OF CARE
Problem: Skin Integrity Impairment, Risk/Actual (Adult)  Goal: Skin Integrity/Wound Healing  Outcome: Ongoing (interventions implemented as appropriate)    Problem: Dying Patient, Actively (Adult)  Goal: Comfort/Pain Control  Outcome: Ongoing (interventions implemented as appropriate)  Goal: Dying Process, Peace and Dignity  Outcome: Ongoing (interventions implemented as appropriate)

## 2017-03-20 NOTE — PROGRESS NOTES
Palliative Care/Hospice Follow Up Note       LOS: 3 days   Patient Care Team:  Catrina Santiago MD as PCP - General (Internal Medicine)    Chief Complaint:  Pneumonia    Interval History:     Patient Complaints: Not awake.  Patient Denies:  None.  History taken from:  RN.    Review of Systems: As above.     Palliative Performance Scale  Palliative Performance Scale Score: 20%  [place] Symptom Assessment System Revised  Pain Score: no pain  Tiredness Score: 8  Nausea Score: No nausea  Depression Score: unable to assess  Anxiety Score: No anxiety  Drowsiness Score: 8  Lack of Appetite Score: 9  Wellbeing Score: 7  Dyspnea Score: No shortness of breath  Other Problem Score: unable to assess  Source of Information: healthcare professional caregiver  Intervention: other (see comment) (not needed at this time)  Intervention Response: other (see comment) (not needed at this time)    Vital Signs  Temp:  [97 °F (36.1 °C)-97.8 °F (36.6 °C)] 97 °F (36.1 °C)  Heart Rate:  [64-67] 67  Resp:  [16] 16  BP: (89-98)/(49-56) 98/56  O2 Device: room air SpO2:  [85 %-89 %] 89 %    Physical Exam:  General Appearance:    Not awake today and appears in no acute distress   Throat:   No oral lesions, oral mucosa dry   Neck:   No adenopathy, supple, trachea midline   Lungs:     Clear to auscultation, occasional scattered inspiratory crackle, respirations regular, even and not labored    Heart:    Regular rhythm and normal rate   Abdomen:     Occasional bowel sounds, no masses, no organomegaly, soft and non-tender, non-distended   Extremities:   No edema, no cyanosis   Pulses:   Radial pulses palpable and equal bilaterally          Results Review:     I reviewed the patient's new clinical results.    Medication Reviewed.    Assessment/Plan     Principal Problem:    Pneumonia due to infectious organism  Active Problems:    Acute respiratory failure with hypoxia    Acute kidney injury    Admission for hospice care    Chronic kidney disease,  stage IV (severe)    COPD (chronic obstructive pulmonary disease)    Idiopathic chronic pancreatitis    Abnormal LFTs (liver function tests)    Pulmonary hypertension, moderate to severe    Diastolic dysfunction with chronic heart failure    Hypertension    I reviewed with the RN.  Symptoms seem to be controlled.presently.  She has received 1 dose of 0.5 mg PO Ativan thus far today.  She has received 1 dose Percocet 5-325 thus far today.  Continue with symptom management.  AND/DNR.  No family present.    Plan for disposition:HSB.    Trung Rousseau MD  03/20/17  1:13 PM

## 2017-03-20 NOTE — PLAN OF CARE
Problem: Patient Care Overview (Adult)  Goal: Plan of Care Review  Outcome: Ongoing (interventions implemented as appropriate)    03/19/17 1804 03/19/17 2001 03/20/17 0412   Coping/Psychosocial Response Interventions   Plan Of Care Reviewed With --  patient --    Patient Care Overview   Progress declining --  --    Outcome Evaluation   Outcome Summary/Follow up Plan --  --  Maintained comfort measures per palliative care protocol. No family at bedside all night. Patient slept most of the night. Will continue to monitor vital signs and comfort.       Goal: Adult Individualization and Mutuality  Outcome: Ongoing (interventions implemented as appropriate)  Goal: Discharge Needs Assessment  Outcome: Ongoing (interventions implemented as appropriate)

## 2017-03-20 NOTE — PROGRESS NOTES
[facility] Visit Report    Clementina Betancur  [id number]  3/20/2017    Admission R/T [facility] Dx: yes    Reason for [facility] Admission:    Symptom  Management: Respiratory Distress    Nursing/Medication Recommendations:    Psychosocial Issues and Recommendations:    Spiritual Concerns and Recommendations:    [facility] Discharge Plans:  incomplete; patient remains [facility] and [facility] will round daily      Review of Visit (Include All Collaboration- including names of hospital and family involved during admission/visit):  GIP status - P collab with [facility] Sepideh Leonard, pt receiving PO meds except in the last 2 days has received IV morphine 4 mg x2 (1x each day, yesterday and today); pt has PNA, ARF, Bacteremia, CKD4, c. diff., hypoxic respiratory failure; will continue to monitor and assess daily.    Pt friend Leticia with pt, pt resting, P spoke pt's name, she opened her eyes, denied pain, somewhat lethargic, when pt found out [facility] role, she asked for a , indicating she had asked for one some days ago. Pt is a member of [facility] in [place]; [facility] shared he would f/u with [facility], closed visit to allow pt to enjoy friend;    [facility] Kj came by, [facility] collab, Kj had called [facility] last week, Fr Quiros had visited, pt asleep, and had told Kj he would follow up the next day. [facility] called pt vahe Virgen, who welcomed call and confirmed for [facility] to call . [facility] called [facility], who was going to be able to come by in under 2 hours, P updated pt and called and updated [facility] by phone, updated Kj.    [facility] updated [facility] PRISCILLA donato coming about 5:00 PM.      Westley Navarrete, [facility]

## 2017-03-21 NOTE — PROGRESS NOTES
[facility] Visit Report    Clementina Betancur  [id number]  3/21/2017    Admission R/T [facility] Dx: yes    Reason for [facility] Admission:    Symptom  Management: Respiratory Distress    Nursing/Medication Recommendations:    Psychosocial Issues and Recommendations:    Spiritual Concerns and Recommendations:    [facility] Discharge Plans:  incomplete; patient remains [facility] and [facility] will round daily      Review of Visit (Include All Collaboration- including names of hospital and family involved during admission/visit):  Patient sitting up in bed eating ice chips when i arrived. No family at the bedside. patient denies pain at this time. patient receiving morphine, ativan, zofran and percocet for symptom management. No plans to discharge at this time. Will cont to visit daily to assess needs    Yamile Garcia RN

## 2017-03-21 NOTE — PLAN OF CARE
Problem: Patient Care Overview (Adult)  Goal: Plan of Care Review  Outcome: Ongoing (interventions implemented as appropriate)    03/20/17 1815 03/20/17 2024 03/21/17 0427   Coping/Psychosocial Response Interventions   Plan Of Care Reviewed With --  patient --    Patient Care Overview   Progress no change --  --    Outcome Evaluation   Outcome Summary/Follow up Plan --  --  Patient slept most of the night. Patient was medicated PRN for pain and anxiety. Maintained comfort measures per palliative care protocol. No family at bedside. Will continue to monitor vital signs and comfort.       Goal: Adult Individualization and Mutuality  Outcome: Ongoing (interventions implemented as appropriate)  Goal: Discharge Needs Assessment  Outcome: Ongoing (interventions implemented as appropriate)    Problem: Pressure Ulcer (Adult)  Goal: Signs and Symptoms of Listed Potential Problems Will be Absent or Manageable (Pressure Ulcer)  Outcome: Ongoing (interventions implemented as appropriate)

## 2017-03-21 NOTE — PROGRESS NOTES
Palliative Care/Hospice Follow Up Note       LOS: 4 days   Patient Care Team:  Catrina Santiago MD as PCP - General (Internal Medicine)    Chief Complaint:  Pneumonia    Interval History:     Patient Complaints: Awake.  Patient Denies:  LLE was in pain, treated. No SOA, CP, or abd pain.   History taken from:  Patient and longtime  and friend and RN.    Review of Systems: As above.     Palliative Performance Scale  Palliative Performance Scale Score: 20%  [place] Symptom Assessment System Revised  Pain Score: 7  Tiredness Score: 7  Nausea Score: No nausea  Depression Score: 8  Anxiety Score: 8  Drowsiness Score: 4  Lack of Appetite Score: 8  Wellbeing Score: 9  Dyspnea Score: 9  Other Problem Score: unable to assess  Source of Information: healthcare professional caregiver  Intervention: other (see comment) (not needed at this time)  Intervention Response: other (see comment) (not needed at this time)    Vital Signs  Temp:  [98.3 °F (36.8 °C)-98.5 °F (36.9 °C)] 98.5 °F (36.9 °C)  Heart Rate:  [63-77] 77  Resp:  [14-16] 16  BP: (100-106)/(53-59) 100/59  O2 Device: room air SpO2:  [86 %-88 %] 88 %    Physical Exam:  General Appearance:    Awake today and appears in no acute distress   Throat:   No oral lesions, oral mucosa dry   Neck:   No adenopathy, supple, trachea midline   Lungs:     Clear to auscultation, occasional scattered inspiratory crackle, respirations regular, even and not labored    Heart:    Regular rhythm and normal rate   Abdomen:     Occasional bowel sounds, no masses, no organomegaly, soft and non-tender, non-distended   Extremities:   No edema, no cyanosis   Pulses:   Radial pulses palpable and equal bilaterally          Results Review:     I reviewed the patient's new clinical results.    Medication Reviewed.    Assessment/Plan     Principal Problem:    Pneumonia due to infectious organism  Active Problems:    Acute respiratory failure with hypoxia    Acute kidney injury     Admission for hospice care    Chronic kidney disease, stage IV (severe)    COPD (chronic obstructive pulmonary disease)    Idiopathic chronic pancreatitis    Abnormal LFTs (liver function tests)    Pulmonary hypertension, moderate to severe    Diastolic dysfunction with chronic heart failure    Hypertension    I reviewed with the patient and RN.  Symptoms seem to be controlled.presently.  She has received 0 doses of Ativan thus far today.  She has received 1 dose 4 mg morphine today and 0 doses Percocet 5-325 thus far today.  Continue with symptom management.  AND/DNR.      Plan for disposition:HSB.    Trung Rousseau MD  03/21/17  1:34 PM

## 2017-03-21 NOTE — PLAN OF CARE
Problem: Dying Patient, Actively (Adult)  Goal: Comfort/Pain Control  Outcome: Ongoing (interventions implemented as appropriate)    03/20/17 1815   Dying Patient, Actively (Adult)   Comfort/Pain Control making progress toward outcome       Goal: Dying Process, Peace and Dignity  Outcome: Ongoing (interventions implemented as appropriate)    Problem: Patient Care Overview (Adult)  Goal: Plan of Care Review  Outcome: Ongoing (interventions implemented as appropriate)  Goal: Adult Individualization and Mutuality  Outcome: Ongoing (interventions implemented as appropriate)  Goal: Discharge Needs Assessment  Outcome: Ongoing (interventions implemented as appropriate)    Problem: Pressure Ulcer (Adult)  Goal: Signs and Symptoms of Listed Potential Problems Will be Absent or Manageable (Pressure Ulcer)  Outcome: Ongoing (interventions implemented as appropriate)

## 2017-03-22 NOTE — PLAN OF CARE
Problem: Dying Patient, Actively (Adult)  Goal: Comfort/Pain Control  Outcome: Ongoing (interventions implemented as appropriate)    03/21/17 1820   Dying Patient, Actively (Adult)   Comfort/Pain Control making progress toward outcome       Goal: Dying Process, Peace and Dignity  Outcome: Ongoing (interventions implemented as appropriate)    Problem: Patient Care Overview (Adult)  Goal: Plan of Care Review  Outcome: Ongoing (interventions implemented as appropriate)  Goal: Adult Individualization and Mutuality  Outcome: Ongoing (interventions implemented as appropriate)  Goal: Discharge Needs Assessment  Outcome: Ongoing (interventions implemented as appropriate)    Problem: Pressure Ulcer (Adult)  Goal: Signs and Symptoms of Listed Potential Problems Will be Absent or Manageable (Pressure Ulcer)  Outcome: Ongoing (interventions implemented as appropriate)

## 2017-03-22 NOTE — PLAN OF CARE
Problem: Dying Patient, Actively (Adult)  Goal: Comfort/Pain Control  Outcome: Ongoing (interventions implemented as appropriate)  Goal: Dying Process, Peace and Dignity  Outcome: Ongoing (interventions implemented as appropriate)    Problem: Patient Care Overview (Adult)  Goal: Plan of Care Review  Outcome: Ongoing (interventions implemented as appropriate)  Goal: Adult Individualization and Mutuality  Outcome: Ongoing (interventions implemented as appropriate)  Goal: Discharge Needs Assessment  Outcome: Ongoing (interventions implemented as appropriate)    Problem: Pressure Ulcer (Adult)  Goal: Signs and Symptoms of Listed Potential Problems Will be Absent or Manageable (Pressure Ulcer)  Outcome: Ongoing (interventions implemented as appropriate)

## 2017-03-22 NOTE — CONSULTS
Spiritual Assessment reveals that pt has requested a visit from a .  Pt has support from her niece, who was bedside at time of pastoral visit.  Chaplain Demarco verified with Chaplain Ruffin that  Bright plans to visit pt this afternoon.  Pt was unable to respond, but chaplain Demarco told pt's niece of 's plans.  Chaplain Demarco offered pastoral blessing on pt.  Pt's niece was appreciative and receptive of pastoral visit.    Kj Demarco M.Div.  Chaplain Resident

## 2017-03-22 NOTE — PLAN OF CARE
Problem: Dying Patient, Actively (Adult)  Goal: Comfort/Pain Control  Outcome: Ongoing (interventions implemented as appropriate)  Goal: Dying Process, Peace and Dignity  Outcome: Ongoing (interventions implemented as appropriate)    Problem: Patient Care Overview (Adult)  Goal: Plan of Care Review  Outcome: Ongoing (interventions implemented as appropriate)    03/22/17 0536   Coping/Psychosocial Response Interventions   Plan Of Care Reviewed With patient   Patient Care Overview   Progress declining   Outcome Evaluation   Outcome Summary/Follow up Plan Morphine and ativan x1 with good results for anxiety and pain. Will continue to monitor for safety, skin, anxiety and comfort         Problem: Pressure Ulcer (Adult)  Goal: Signs and Symptoms of Listed Potential Problems Will be Absent or Manageable (Pressure Ulcer)  Outcome: Ongoing (interventions implemented as appropriate)

## 2017-03-23 NOTE — PROGRESS NOTES
[facility] Visit Report    Clementina Betancur  [id number]  3/23/2017    Admission R/T [facility] Dx: yes    Reason for [facility] Admission:    Symptom  Management: Respiratory Distress    Nursing/Medication Recommendations:    Psychosocial Issues and Recommendations:    Spiritual Concerns and Recommendations:    [facility] Discharge Plans:  incomplete; patient remains [facility] and [facility] will round daily      Review of Visit (Include All Collaboration- including names of hospital and family involved during admission/visit):  [facility] collab with [facility] RN Fernando, since midnight pt has received IV Ativan 1 mg x3, IV morphine 4 mg x3 (q4 prior to turns), non-responsive except to pain; VS: T100.7, P77, R18, /55, O2=92%@2L NC;    Pt non-responsive, no family present, per RN family in earlier; [facility] provided presence, later had prayer aloud at bedside, card left for family with date/time/ref to prayer.      Westley Navarrete, [facility]

## 2017-03-23 NOTE — PLAN OF CARE
Problem: Dying Patient, Actively (Adult)  Goal: Comfort/Pain Control  Outcome: Ongoing (interventions implemented as appropriate)  Goal: Dying Process, Peace and Dignity  Outcome: Outcome(s) achieved Date Met:  03/23/17    Problem: Patient Care Overview (Adult)  Goal: Plan of Care Review  Outcome: Ongoing (interventions implemented as appropriate)  Goal: Adult Individualization and Mutuality  Outcome: Ongoing (interventions implemented as appropriate)  Goal: Discharge Needs Assessment  Outcome: Ongoing (interventions implemented as appropriate)    Problem: Pressure Ulcer (Adult)  Goal: Signs and Symptoms of Listed Potential Problems Will be Absent or Manageable (Pressure Ulcer)  Outcome: Ongoing (interventions implemented as appropriate)

## 2017-03-23 NOTE — PROGRESS NOTES
Palliative Care/Hospice Follow Up Note       LOS: 5 days   Patient Care Team:  Catrina Santiago MD as PCP - General (Internal Medicine)    Chief Complaint:  Pneumonia    Interval History:     Patient Complaints: Asleep and medicated.  Patient Denies:  None.   History taken from:  RN.    Review of Systems: As above.     Palliative Performance Scale  Palliative Performance Scale Score: 20%  [place] Symptom Assessment System Revised  Pain Score: 6  Tiredness Score: 6  Nausea Score: No nausea  Depression Score: unable to assess  Anxiety Score: 3  Drowsiness Score: 7  Lack of Appetite Score: 7  Wellbeing Score: unable to assess  Dyspnea Score: 3  Other Problem Score: unable to assess  Source of Information: healthcare professional caregiver, family caregiver  Intervention: medicated/see MAR  Intervention Response: tolerated    Vital Signs  Temp:  [97 °F (36.1 °C)-100.5 °F (38.1 °C)] 100.5 °F (38.1 °C)  Heart Rate:  [65-74] 74  Resp:  [16-18] 18  BP: (87-94)/(48-63) 87/48  O2 Device: nasal cannula SpO2:  [94 %-96 %] 94 %    Physical Exam:  General Appearance:    Asleep and appears in no acute distress   Throat:   No oral lesions, oral mucosa dry   Neck:   No adenopathy, supple, trachea midline   Lungs:     Clear to auscultation, occasional scattered inspiratory crackle, slight expiratory puffing, respirations regular, even and not labored    Heart:    Regular rhythm and normal rate   Abdomen:     Occasional bowel sounds, no masses, no organomegaly, soft and non-tender, non-distended   Extremities:   No edema, no cyanosis   Pulses:   Radial pulses palpable and equal bilaterally          Results Review:     I reviewed the patient's new clinical results.    Medication Reviewed.    Assessment/Plan     Principal Problem:    Pneumonia due to infectious organism  Active Problems:    Acute respiratory failure with hypoxia    Acute kidney injury    Admission for hospice care    Chronic kidney disease, stage IV (severe)    COPD  (chronic obstructive pulmonary disease)    Idiopathic chronic pancreatitis    Abnormal LFTs (liver function tests)    Pulmonary hypertension, moderate to severe    Diastolic dysfunction with chronic heart failure    Hypertension    I reviewed with the patient and RN.  Symptoms seem to be controlled.presently.  She has received 1 dose of Ativan thus far today.  She has received 3 doses of 2 mg & 1 dose 4 mg morphine today and 0 doses Percocet 5-325 thus far today.  Continue with symptom management.  AND/DNR.      Plan for disposition:HSB.    Trung Rousseau MD  03/22/17  9:00 PM

## 2017-03-23 NOTE — PROGRESS NOTES
Palliative Care/Hospice Follow Up Note       LOS: 6 days   Patient Care Team:  Catrina Santiago MD as PCP - General (Internal Medicine)    Chief Complaint:  Pneumonia    Interval History:     Patient Complaints: Asleep and medicated.  Patient Denies:  None.   History taken from:  RN.    Review of Systems: As above.     Palliative Performance Scale  Palliative Performance Scale Score: 20%  [place] Symptom Assessment System Revised  Pain Score: no pain  Tiredness Score: 8  Nausea Score: No nausea  Depression Score: unable to assess  Anxiety Score: No anxiety  Drowsiness Score: 8  Lack of Appetite Score: unable to assess  Wellbeing Score: 5  Dyspnea Score: No shortness of breath  Other Problem Score: Best possible response  Source of Information: healthcare professional caregiver  Intervention: medicated/see MAR  Intervention Response: tolerated    Vital Signs  Temp:  [100.7 °F (38.2 °C)-102.3 °F (39.1 °C)] 102.3 °F (39.1 °C)  Heart Rate:  [77-85] 85  Resp:  [18-24] 24  BP: ()/(47-55) 90/47  O2 Device: room airFlow (L/min):  [2] 2SpO2:  [85 %-92 %] 85 %    Physical Exam:  General Appearance:    Asleep and appears in no acute distress   Throat:   No oral lesions, oral mucosa dry   Neck:   No adenopathy, supple, trachea midline   Lungs:     Clear to auscultation, occasional scattered inspiratory crackle, slight coarse irregular inspiratory sounds, respirations not labored    Heart:    Regular rhythm and normal rate   Abdomen:     Occasional bowel sounds, no masses, no organomegaly, soft and non-tender, non-distended   Extremities:   No edema, no cyanosis   Pulses:   Radial pulses palpable and equal bilaterally          Results Review:     I reviewed the patient's new clinical results.    Medication Reviewed.    Assessment/Plan     Principal Problem:    Pneumonia due to infectious organism  Active Problems:    Acute respiratory failure with hypoxia    Acute kidney injury    Admission for hospice care    Chronic  kidney disease, stage IV (severe)    COPD (chronic obstructive pulmonary disease)    Idiopathic chronic pancreatitis    Abnormal LFTs (liver function tests)    Pulmonary hypertension, moderate to severe    Diastolic dysfunction with chronic heart failure    Hypertension    I reviewed with the patient and RN.  Symptoms seem to be controlled.presently.  She has received 3 doses 1 mf of Ativan thus far today.  She has received 3 doses of 4 mg morphine today.  Continue with symptom management.  AND/DNR.  No family present.    Plan for disposition:[facility].    Trung Rousseau MD  03/23/17  7:12 PM

## 2017-03-23 NOTE — PLAN OF CARE
Problem: Dying Patient, Actively (Adult)  Goal: Comfort/Pain Control  Outcome: Ongoing (interventions implemented as appropriate)  Goal: Dying Process, Peace and Dignity  Outcome: Ongoing (interventions implemented as appropriate)    Problem: Patient Care Overview (Adult)  Goal: Plan of Care Review  Outcome: Ongoing (interventions implemented as appropriate)    03/23/17 0616   Coping/Psychosocial Response Interventions   Plan Of Care Reviewed With patient   Patient Care Overview   Progress declining   Outcome Evaluation   Outcome Summary/Follow up Plan Slept most of the night without s/s of distress. Medicated x1 for pain and anxiety at 0430. Appears to be declining. Will continue comfort care         Problem: Pressure Ulcer (Adult)  Goal: Signs and Symptoms of Listed Potential Problems Will be Absent or Manageable (Pressure Ulcer)  Outcome: Ongoing (interventions implemented as appropriate)

## 2017-03-24 NOTE — PLAN OF CARE
Problem: Dying Patient, Actively (Adult)  Goal: Comfort/Pain Control  Outcome: Ongoing (interventions implemented as appropriate)    Problem: Patient Care Overview (Adult)  Goal: Plan of Care Review  Outcome: Ongoing (interventions implemented as appropriate)    03/24/17 0531   Coping/Psychosocial Response Interventions   Plan Of Care Reviewed With patient   Patient Care Overview   Progress declining   Outcome Evaluation   Outcome Summary/Follow up Plan Appears comfortable. Will coninue palliative measures

## 2017-03-24 NOTE — PROGRESS NOTES
Palliative Care/Hospice Follow Up Note/Discharge As      Date of Admisssion:  3/17/2017  Date of Death:  3/24/2017  Time of Death:  1:50 PM    Patient Care Team:  Catrina Santiago MD as PCP - General (Internal Medicine)    Final Diagnosis:   Principal Problem:    Pneumonia due to infectious organism  Active Problems:    Acute respiratory failure with hypoxia    Acute kidney injury    Admission for hospice care    Chronic kidney disease, stage IV (severe)    COPD (chronic obstructive pulmonary disease)    Idiopathic chronic pancreatitis    Abnormal LFTs (liver function tests)    Pulmonary hypertension, moderate to severe    Diastolic dysfunction with chronic heart failure    Hypertension     LOS: 7 days     Chief Complaint:  Pneumonia    Interval History:  Patient seen at 1115.  Patient's daughter and friend at bedside and i discussed with them.    Patient Complaints: Not awake.  Patient Denies:  None.   History taken from:  Daughter and RN.    Review of Systems: As above.     Palliative Performance Scale  Palliative Performance Scale Score: 0%  [place] Symptom Assessment System Revised  Pain Score: 7  Tiredness Score: Worst possible tiredness  Nausea Score: No nausea  Depression Score: unable to assess  Anxiety Score: 6  Drowsiness Score: Worst possible drowsiness  Lack of Appetite Score: Worst lack of appetite  Wellbeing Score: unable to assess  Dyspnea Score: 4  Other Problem Score: unable to assess  Source of Information: healthcare professional caregiver  Intervention: medicated/see MAR  Intervention Response: tolerated    Vital Signs  Temp:  [102.3 °F (39.1 °C)-103 °F (39.4 °C)] 103 °F (39.4 °C)  Heart Rate:  [0-85] 0  Resp:  [0-24] 0  BP: (75-90)/(41-47) 75/41  O2 Device: room air SpO2:  [76 %-85 %] 76 %    Physical Exam:  General Appearance:    Not awake and appears in no acute distress   Throat:   No oral lesions, oral mucosa dry   Neck:   No adenopathy, supple, trachea midline   Lungs:     Clear to  auscultation, occasional scattered inspiratory crackle, slight occasional expiratory moan, respirations not labored    Heart:    Regular rhythm and normal rate   Abdomen:     Occasional bowel sounds, no masses, no organomegaly, soft and non-tender, non-distended   Extremities:   No edema, no cyanosis   Pulses:   Radial pulses palpable and equal bilaterally       Results Review:     I reviewed the patient's new clinical results.    Medication Reviewed.    Assessment/Plan     Principal Problem:    Pneumonia due to infectious organism  Active Problems:    Acute respiratory failure with hypoxia    Acute kidney injury    Admission for hospice care    Chronic kidney disease, stage IV (severe)    COPD (chronic obstructive pulmonary disease)    Idiopathic chronic pancreatitis    Abnormal LFTs (liver function tests)    Pulmonary hypertension, moderate to severe    Diastolic dysfunction with chronic heart failure    Hypertension    I reviewed with the daughter and RN.  Symptoms seem to be controlled.presently.  She has received 1 dose 0.5 mg and 1 dose of 1 mg of Ativan thus far today.  She has received 3 doses of 4 mg morphine thus far today (3 doses yesterday).  Continue with symptom management.  AND/DNR.      Subsequently, i was called that the patient passed away at 1350 hours.    Plan for disposition:[facility].    Trung Rousseau MD  03/24/17  2:50 PM

## 2023-01-13 NOTE — ED NOTES
[facility] for transport to nursing home.  No distress noted on D/C.     Tracy Dykes RN  02/17/17 [id number]     3 = A little assistance

## 2025-07-05 NOTE — PLAN OF CARE
"Gastroenterology Progress Note    Subjective/Interval History:  Patient more awake and alert today.  He states he is thirsty and hungry.  He denies any abdominal pain.  Apparently MRCP done last night but results not available    ROS:  ROS    Vital Signs:  /62   Pulse 76   Temp 98.1 °F (36.7 °C) (Oral)   Resp 18   Ht 5' 8.9" (1.75 m)   Wt 54.1 kg (119 lb 4.3 oz)   SpO2 99%   BMI 17.66 kg/m²   Body mass index is 17.66 kg/m².    Physical Exam:  Physical Exam  In general:  Somewhat somnolent    Abdomen: Soft, nontender, bowel sounds present, no mass or organomegaly appreciated  Labs:  Recent Results (from the past 48 hours)   Comprehensive Metabolic Panel    Collection Time: 07/04/25  3:12 AM   Result Value Ref Range    Sodium 136 136 - 145 mmol/L    Potassium 3.8 3.5 - 5.1 mmol/L    Chloride 102 98 - 107 mmol/L    CO2 18 (L) 23 - 31 mmol/L    Glucose 40 (LL) 82 - 115 mg/dL    Blood Urea Nitrogen 29.1 (H) 8.4 - 25.7 mg/dL    Creatinine 1.83 (H) 0.72 - 1.25 mg/dL    Calcium 7.8 (L) 8.8 - 10.0 mg/dL    Protein Total 8.5 (H) 5.8 - 7.6 gm/dL    Albumin 1.6 (L) 3.4 - 4.8 g/dL    Globulin 6.9 (H) 2.4 - 3.5 gm/dL    Albumin/Globulin Ratio 0.2 (L) 1.1 - 2.0 ratio    Bilirubin Total 4.9 (H) <=1.5 mg/dL     (H) 40 - 150 unit/L     (H) 0 - 55 unit/L     (H) 11 - 45 unit/L    eGFR 41 mL/min/1.73/m2    Anion Gap 16.0 mEq/L    BUN/Creatinine Ratio 16    Protime-INR    Collection Time: 07/04/25  3:12 AM   Result Value Ref Range    PT 20.7 (H) 12.5 - 14.5 seconds    INR 1.8 (H) <=1.3   Magnesium    Collection Time: 07/04/25  3:12 AM   Result Value Ref Range    Magnesium Level 1.80 1.60 - 2.60 mg/dL   Phosphorus    Collection Time: 07/04/25  3:12 AM   Result Value Ref Range    Phosphorus Level 5.8 (H) 2.3 - 4.7 mg/dL   CBC with Differential    Collection Time: 07/04/25  3:12 AM   Result Value Ref Range    WBC 15.86 (H) 4.50 - 11.50 x10(3)/mcL    RBC 3.53 (L) 4.70 - 6.10 x10(6)/mcL    Hgb 10.4 (L) 14.0 " Problem: Patient Care Overview (Adult)  Goal: Plan of Care Review  Outcome: Ongoing (interventions implemented as appropriate)    03/06/17 1938   Coping/Psychosocial Response Interventions   Plan Of Care Reviewed With patient;daughter   Patient Care Overview   Progress declining   Outcome Evaluation   Outcome Summary/Follow up Plan Patient continues to c/o epigastric pain 7 to 8/10 alleviated by alternating prn Percocet and Morphine. CT results show possible pancreatitis, patients PO intake has been low, urine is orange r/t Pyridium administration, abdominal binder remains in place r/t umbilical hernia mesh repair. No s/s of acute distress, vital signs WNL, will continue to monitor.        Goal: Adult Individualization and Mutuality  Outcome: Ongoing (interventions implemented as appropriate)  Goal: Discharge Needs Assessment  Outcome: Ongoing (interventions implemented as appropriate)    Problem: Renal Failure/Kidney Injury, Acute (Adult)  Goal: Signs and Symptoms of Listed Potential Problems Will be Absent or Manageable (Renal Failure/Kidney Injury, Acute)  Outcome: Ongoing (interventions implemented as appropriate)    Problem: Pain, Acute (Adult)  Goal: Acceptable Pain Control/Comfort Level  Outcome: Ongoing (interventions implemented as appropriate)    Problem: Fall Risk (Adult)  Goal: Absence of Falls  Outcome: Ongoing (interventions implemented as appropriate)    Problem: Skin Integrity Impairment, Risk/Actual (Adult)  Goal: Identify Related Risk Factors and Signs and Symptoms  Outcome: Ongoing (interventions implemented as appropriate)  Goal: Skin Integrity/Wound Healing  Outcome: Ongoing (interventions implemented as appropriate)       - 18.0 g/dL    Hct 31.5 (L) 42.0 - 52.0 %    MCV 89.2 80.0 - 94.0 fL    MCH 29.5 27.0 - 31.0 pg    MCHC 33.0 33.0 - 36.0 g/dL    RDW 17.2 (H) 11.5 - 17.0 %    Platelet 283 130 - 400 x10(3)/mcL    MPV 9.8 7.4 - 10.4 fL   Troponin I    Collection Time: 07/04/25  3:12 AM   Result Value Ref Range    Troponin-I 0.023 0.000 - 0.045 ng/mL   Manual Differential    Collection Time: 07/04/25  3:12 AM   Result Value Ref Range    WBC 15.86 x10(3)/mcL    Neutrophils % 78 %    Lymphs % 5 %    Monocytes % 17 %    Neutrophils Abs 12.3708 (H) 2.1 - 9.2 x10(3)/mcL    Lymphs Abs 0.793 0.6 - 4.6 x10(3)/mcL    Monocytes Abs 2.6962 (H) 0.1 - 1.3 x10(3)/mcL    Platelets Normal Normal, Adequate    RBC Morph Abnormal (A) Normal    Poikilocytosis 2+ (A) (none)    Anisocytosis 1+ (A) (none)    [place] Cells 2+ (A) (none)   T4, Free    Collection Time: 07/04/25  3:12 AM   Result Value Ref Range    Thyroxine Free 1.06 0.70 - 1.48 ng/dL   TSH    Collection Time: 07/04/25  3:12 AM   Result Value Ref Range    TSH 1.710 0.350 - 4.940 uIU/mL   AFP Tumor Marker    Collection Time: 07/04/25  3:12 AM   Result Value Ref Range    Alpha Fetoprotein Level 83.50 (H) <=8.90 ng/mL   Cancer Antigen 19-9    Collection Time: 07/04/25  3:12 AM   Result Value Ref Range    CA 19-9 82.01 (H) 0.00 - 37.00 unit/mL       Collection Time: 07/04/25  3:12 AM   Result Value Ref Range    Cancer Antigen 125 824.2 (H) 0.0 - 35.0 unit/mL   POCT glucose    Collection Time: 07/04/25  5:14 AM   Result Value Ref Range    POCT Glucose 72 70 - 110 mg/dL   POCT glucose    Collection Time: 07/04/25  5:44 AM   Result Value Ref Range    POCT Glucose 234 (H) 70 - 110 mg/dL   Ammonia    Collection Time: 07/04/25  8:15 AM   Result Value Ref Range    Ammonia Level 45.6 18.0 - 72.0 umol/L   Folate    Collection Time: 07/04/25  8:15 AM   Result Value Ref Range    Folate Level 13.2 7.0 - 31.4 ng/mL   Comprehensive Metabolic Panel    Collection Time: 07/05/25  3:32 AM   Result Value Ref  Range    Sodium 135 (L) 136 - 145 mmol/L    Potassium 4.4 3.5 - 5.1 mmol/L    Chloride 103 98 - 107 mmol/L    CO2 22 (L) 23 - 31 mmol/L    Glucose 61 (L) 82 - 115 mg/dL    Blood Urea Nitrogen 45.3 (H) 8.4 - 25.7 mg/dL    Creatinine 2.10 (H) 0.72 - 1.25 mg/dL    Calcium 8.2 (L) 8.8 - 10.0 mg/dL    Protein Total 9.6 (H) 5.8 - 7.6 gm/dL    Albumin 1.8 (L) 3.4 - 4.8 g/dL    Globulin 7.8 (H) 2.4 - 3.5 gm/dL    Albumin/Globulin Ratio 0.2 (L) 1.1 - 2.0 ratio    Bilirubin Total 4.4 (H) <=1.5 mg/dL     (H) 40 - 150 unit/L     (H) 0 - 55 unit/L     (H) 11 - 45 unit/L    eGFR 35 mL/min/1.73/m2    Anion Gap 10.0 mEq/L    BUN/Creatinine Ratio 22    Protime-INR    Collection Time: 07/05/25  3:32 AM   Result Value Ref Range    PT 18.8 (H) 12.5 - 14.5 seconds    INR 1.6 (H) <=1.3   CBC with Differential    Collection Time: 07/05/25  3:32 AM   Result Value Ref Range    WBC 13.19 (H) 4.50 - 11.50 x10(3)/mcL    RBC 4.04 (L) 4.70 - 6.10 x10(6)/mcL    Hgb 12.0 (L) 14.0 - 18.0 g/dL    Hct 36.2 (L) 42.0 - 52.0 %    MCV 89.6 80.0 - 94.0 fL    MCH 29.7 27.0 - 31.0 pg    MCHC 33.1 33.0 - 36.0 g/dL    RDW 16.9 11.5 - 17.0 %    Platelet 309 130 - 400 x10(3)/mcL    MPV 10.0 7.4 - 10.4 fL    Neut % 75.5 %    Lymph % 12.3 %    Mono % 11.3 %    Eos % 0.2 %    Basophil % 0.2 %    Imm Grans % 0.5 %    Neut # 9.96 (H) 2.1 - 9.2 x10(3)/mcL    Lymph # 1.62 0.6 - 4.6 x10(3)/mcL    Mono # 1.49 (H) 0.1 - 1.3 x10(3)/mcL    Eos # 0.02 0 - 0.9 x10(3)/mcL    Baso # 0.03 <=0.2 x10(3)/mcL    Imm Gran # 0.07 (H) 0.00 - 0.04 x10(3)/mcL    NRBC% 0.0 %   POCT glucose    Collection Time: 07/05/25  6:34 AM   Result Value Ref Range    POCT Glucose 69 (L) 70 - 110 mg/dL   POCT glucose    Collection Time: 07/05/25  8:13 AM   Result Value Ref Range    POCT Glucose 89 70 - 110 mg/dL       Imaging:  US Abdomen Complete  Result Date: 7/4/2025  EXAMINATION: US ABDOMEN COMPLETE CLINICAL HISTORY: worse elevated bili (known cirrhosis), nargis, previously known  portal vein thrombsosi; TECHNIQUE: Multiple real-time transverse and longitudinal sections were performed of the abdomen by the sonographer. Select images were submitted for review. COMPARISON: CT abdomen pelvis same date. FINDINGS: Liver is enlarged in size with maximum diameter of 20.2 cm.  Hepatic parenchyma is echogenic consistent with steatosis.. There is no delineation of discrete hepatic cystic or solid space occupying mass.  There is no flow within the portal vein which is remarkable for intraluminal 2.12 cm echogenicity consistent with thrombosis.  Pancreas obscured by overlying bowel gas.  Spleen is of unremarkable echotexture with normal size and maximum diameter of 10.2 cm. No focal splenic lesion visualized. The inferior vena cava appears unremarkable. Visualized portion of the abdominal aorta is without aneurysmal dilatation. Gallbladder is enlarged and measures 11.8 x 5.6 x 6.5 cm.  Gallbladder lumen contains sludge.  There are multiple gallstones and the largest measures 0.83 cm.  Gallbladder wall is thickened 4.7 mm.  Sonographer reported positive Chang's sign. Right kidney measures 12.5 x 5.6 x 6.0 cm and left kidney measures 11.2 x 6.2 x 5.2 cm.  Right kidney cortical volume is adequate and the corticomedullary differentiation is preserved.  There is limited visualized left kidney due to patient's body habitus.  No definite kidneys collecting system dilatation.     1. Hepatomegaly and hepatic steatosis. 2. Thrombosed main portal vein. 3.  Enlarged gallbladder containing sludge and multiple calculi.  Thickened gallbladder wall and positive Chang sign is suspected for acute cholecystitis. Electronically signed by: Mat Sommers Date:    07/04/2025 Time:    15:40    CT Abdomen Pelvis  Without Contrast  Result Date: 7/4/2025  EXAMINATION: CT ABDOMEN PELVIS WITHOUT CONTRAST CLINICAL HISTORY: Right upper quadrant pain, elevated bilirubin, history of cirrhosis and portal vein thrombosis; TECHNIQUE: CT  imaging of the abdomen and pelvis without intravenous contrast. Axial, coronal and sagittal reformatted images reviewed. Dose length product is 486 mGycm. Automatic exposure control, adjustment of mA/kV or iterative reconstruction technique used to limit radiation dose. COMPARISON: CT 06/17/2025 FINDINGS: Assessment of the visceral organs and vasculature is limited by the lack of IV contrast. Liver/biliary: Cirrhosis.  Artifact through the posterior right liver due to arm positioning with limited overall evaluation of the hepatic parenchyma given this artifact and lack of IV contrast.  Gallbladder distended with multiple gallstones.  No biliary dilatation. Pancreas: Normal. Spleen: Normal. Adrenals: Normal. Genitourinary: 7 mm left renal stone.  No hydronephrosis.  Bladder within normal limits. Stomach/bowel: No bowel obstruction. Normal appendix.  Mild proximal colonic wall thickening. Lymph nodes and peritoneum: Few borderline enlarged periportal lymph nodes similar to prior CT.  Small volume ascites. Vasculature: Numerous aortic and iliac artery calcifications. Abdominal wall: Normal. Lung bases: Mild dependent atelectasis. Bones: No acute osseous findings.     Cirrhosis with small volume ascites. Mild wall thickening of the proximal colon favored secondary to the ascites, but colitis also possible. Distended gallbladder with several gallstones, but no defined gallbladder wall thickening. Electronically signed by: John Paul Chicas Date:    07/04/2025 Time:    10:52    CT Head Without Contrast  Result Date: 7/4/2025  EXAMINATION: CT HEAD WITHOUT CONTRAST CLINICAL HISTORY: Mental status change, unknown cause; TECHNIQUE: Sequential axial images were performed of the brain without contrast. Dose product length of 1060 mGycm. Automated exposure control was utilized to minimize radiation dose. COMPARISON: June 2, 2017. FINDINGS: There is no intracranial mass effect, midline shift, hydrocephalus or hemorrhage. There is no  sulcal effacement or low attenuation changes to suggest recent large vessel territory infarction.  There is left paramedian pontine lacunar infarct which is new since the previous exam.  There is also new left internal capsule posterior limb new lacunar infarct.  Chronic microangiopathic ischemic changes are mild.  The ventricular system and sulcal markings prominence is consistent with atrophy. There is no acute extra axial fluid collection. Mucoperiosteal thickening and fluid left maxillary sinus.  Otherwise, visualized paranasal sinuses are clear without mucosal thickening, polypoidal abnormality or air-fluid levels. Mastoid air cells aeration is optimal.     1.  No acute large vessel territory infarct identified. 2.  New but nonacute appearing lacunar infarcts pk and the left internal capsule.  If clinically indicated, consideration may be given to further assessment with MRI of the brain. Electronically signed by: Mat Sommers Date:    07/04/2025 Time:    08:57    Echo  Result Date: 6/26/2025    Left Ventricle: The left ventricle is normal in size. Normal wall thickness. There is normal systolic function with a visually estimated ejection fraction of 60 - 65%.   Right Ventricle: The right ventricle is normal in size Systolic function is reduced.TAPSE is 1.5 cm.   Aortic Valve: The aortic valve is a trileaflet valve. There is aortic valve sclerosis.   IVC/SVC: Normal venous pressure at 3 mmHg.     MRI Abdomen W WO Contrast  Result Date: 6/21/2025  EXAMINATION: MRI ABDOMEN W WO CONTRAST CLINICAL HISTORY: Cirrhosis.Liver lesion, > 1cm; TECHNIQUE: Multiplanar multisequence MRI of the abdomen performed without and with gadolinium based IV contrast . COMPARISON: CT 17 June 2025, CT 18 October 2023 FINDINGS: This exam is nondiagnostic for HCC as only delayed scans obtained secondary to patient motion and inability to hold breath. The liver is cirrhotic and there is portal vein thrombus also seen on CT.   Heterogeneous enhancement of the liver likely relates to portal vein thrombus.  No discrete suspicious lesion seen on diffusion imaging.  Small volume ascites. There are gallstones.  No significant biliary ductal dilatation.  The spleen is not significantly enlarged.  No gross abnormality pancreas or adrenals.  No hydronephrosis. No dilated bowel loops.  Abdominal aorta normal in caliber.     1. Cirrhosis with portal vein thrombus and small volume ascites. 2. Nondiagnostic exam for HCC secondary to timing of contrast.  Recommend outpatient liver mass protocol CT. Electronically signed by: Andriy Laws Date:    06/21/2025 Time:    11:19    IR Paracentesis with Imaging  Result Date: 6/18/2025  PROCEDURE: Ultrasound Guided Paracentesis CLINICAL HISTORY: 63-year-old male with cirrhosis, portal vein thrombus, and ascites ANESTHESIA: Local anesthesia PHYSICIANS: Neto Bird MD PROCEDURAL SUMMARY: After informed consent was obtained, the patient was placed supine on the ultrasound table. A limited ultrasound of the abdomen was performed with Dr. Bird present in the room.  This confirmed the presence of an appropriate volume of ascites for drainage.  The planned skin entry site and route for needle passage for paracentesis was then determined. The skin overlying the right lower quadrant of the abdomen was marked.  The site was then prepped and draped in the usual sterile fashion. The soft tissues were anesthetized with lidocaine and a dermatotomy was performed.  Under ultrasound guidance, a sheath needle was advanced from the skin entry site into the peritoneal cavity.  When the needle entered the peritoneal cavity, fluid was aspirated.  The sheath was then advanced over the needle into the cavity.  The needle was removed.  Aspiration was performed and a total of 2.1 L of serous fluid was drained from the peritoneal cavity.  The catheter was then removed.  A sterile dressing was applied. The patient tolerated the  procedure well and was without any apparent complications.     Technically successful ultrasound-guided paracentesis. Electronically signed by: Neto Bird Date:    06/18/2025 Time:    15:02    CT Abdomen Pelvis With IV Contrast NO Oral Contrast  Result Date: 6/18/2025  EXAMINATION: CT ABDOMEN PELVIS WITH IV CONTRAST CLINICAL HISTORY: Abdominal pain and abdominal distension TECHNIQUE: Axial CT images were obtained through the abdomen and pelvis following IV administration of contrast.  100 mL Omnipaque 350.  Coronal and sagittal reconstructions submitted and interpreted.  Automated exposure control, dose radiation lowering technique, was utilized. COMPARISON: CT abdomen and pelvis without contrast from 10/18/2023 CT abdomen and pelvis from 10/16/2023 Abdominal sonogram from 06/02/2017 FINDINGS: Cirrhotic morphology to the liver.  Thrombus is present within the distal segment of the main portal vein with extension into the right and left portal system.  Thrombus is occlusive at multiple segments of the right portal system.  Splenic vein and SMV appear patent. There is geographic area of slightly increased enhancement at segment 8 measuring 4.2 x 4.0 cm.  No other hypervascular areas.  There are multiple areas of patchy decreased density in the right hepatic lobe which is felt to be secondary to portal vein thrombosis. Spleen is nonenlarged.  Pancreas, adrenal glands and right kidney appear normal.  Nonobstructing left kidney stone measures 10 mm.  No hydronephrosis. Cholelithiasis is present.  The bowel is nonobstructed.  Air and stool are present throughout the colon which appears within normal limits.  Normal appendix is present.  There is moderate volume ascites.  No free air. Moderate atherosclerosis of the abdominal aorta and its branches is present.  Mild, diffuse bladder wall thickening is present.  No suspicious osteolytic or osteoblastic lesion.     1. Portal vein thrombosis which is occlusive within the  right portal system.  There is some collaterals within the annette hepatis with suggests some degree of chronic component.  Given the appearance of the thrombus in addition to the small amount of portal venous collaterals, favor acute on chronic thrombosis. 2. Sequelae of cirrhosis with moderate volume ascites and small gastroesophageal varices. 3. Hypervascular area in segment 8 (image 31, series 1).  Favor perfusion abnormality related to portal vein thrombosis.  However, consider multiphase abdominal MRI to evaluate for potential tumor. 4. Cholelithiasis. 5. Nonobstructing left-sided kidney stone. Electronically signed by: Mukesh Lu MD Date:    06/18/2025 Time:    08:04    CTA Chest Non-Coronary (PE Studies)  Result Date: 6/18/2025  EXAMINATION: CTA CHEST NON CORONARY (PE STUDIES) CLINICAL HISTORY: Pulmonary embolism suspected. 62 yo male presenting with feeling unwell for the past month and a half. He reports abdominal bloating, leg swelling, and decreased appetite. He reports generalized weakness as well. States he normally drinks alcohol daily but hasn't in the past 30 days or so. TECHNIQUE: Axial CT images were obtained through the chest after IV administration of contrast. 100 mL Omnipaque 350. MIP, coronal and sagittal reconstructions submitted and interpreted.  Automated exposure control, dose radiation lowering technique, was utilized. COMPARISON: None FINDINGS: Slightly limited exam secondary to contrast bolus timing. Soft Tissues: Unremarkable. Lines and Tubes: None. Neck: The visualized soft tissues of the neck appear unremarkable. Mediastinum: The mediastinal structures are within normal limits. Heart: The heart size is within normal limits. Mild coronary artery calcification is seen. Aorta: No aortic dissection or aneurysm is seen. Mild aortic calcification is seen in the thoracic aorta. Pulmonary Arteries: No filling defects are seen in the pulmonary arteries to suggest pulmonary embolus.  Lungs: There is moderate non specific dependent change at the lung bases. Mild streaky linear opacity is seen consistent with scarring and subsegmental. No acute focal infiltrate or consolidation is seen. Pleura: No effusions or pneumothorax are identified. Bony Structures: Spine: Subtle spondylolytic changes are seen in the thoracic spine. Ribs: The ribs appear unremarkable.     Slightly limited exam of the pulmonary arteries secondary to contrast bolus timing. 1. No filling defects are seen in the pulmonary arteries to suggest pulmonary embolus. 2. No acute focal infiltrate or consolidation is seen. 3. Details and other findings as discussed above. [employer] concurrence Electronically signed by: Mukesh Lu MD Date:    06/18/2025 Time:    07:57         Assessment/Plan:  Impression:  Worsening liver function studies    Recommendation:  Await MRCP results for further disposition       Alec Borges MD